# Patient Record
Sex: MALE | Race: WHITE | Employment: PART TIME | URBAN - METROPOLITAN AREA
[De-identification: names, ages, dates, MRNs, and addresses within clinical notes are randomized per-mention and may not be internally consistent; named-entity substitution may affect disease eponyms.]

---

## 2017-02-08 ENCOUNTER — OFFICE VISIT (OUTPATIENT)
Dept: DERMATOLOGY CLINIC | Facility: CLINIC | Age: 62
End: 2017-02-08

## 2017-02-08 DIAGNOSIS — C44.41 BASAL CELL CARCINOMA OF SCALP AND SKIN OF NECK: ICD-10-CM

## 2017-02-08 DIAGNOSIS — L82.1 SEBORRHEIC KERATOSES: ICD-10-CM

## 2017-02-08 DIAGNOSIS — D23.9 BENIGN NEOPLASM OF SKIN, UNSPECIFIED LOCATION: ICD-10-CM

## 2017-02-08 DIAGNOSIS — D48.5 NEOPLASM OF UNCERTAIN BEHAVIOR OF SKIN: Primary | ICD-10-CM

## 2017-02-08 DIAGNOSIS — Z85.828 HISTORY OF SKIN CANCER: ICD-10-CM

## 2017-02-08 PROCEDURE — 99213 OFFICE O/P EST LOW 20 MIN: CPT | Performed by: DERMATOLOGY

## 2017-02-08 PROCEDURE — 17272 DSTR MAL LES S/N/H/F/G 1.1-2: CPT | Performed by: DERMATOLOGY

## 2017-02-08 PROCEDURE — 11100 BIOPSY OF SKIN LESION: CPT | Performed by: DERMATOLOGY

## 2017-02-13 ENCOUNTER — TELEPHONE (OUTPATIENT)
Dept: DERMATOLOGY CLINIC | Facility: CLINIC | Age: 62
End: 2017-02-13

## 2017-02-14 NOTE — TELEPHONE ENCOUNTER
Plan was Mohs for the nose lesion--Dr. Lelo Cobos in past or Dr. Sandra Will am ok with either. Discussed Dr. Lulu Omalley in office and rtc4 mos post op    Scalp lesion e&c'ed at visit.     Please let pt know

## 2017-02-15 NOTE — PROGRESS NOTES
Parish Saravia is a 62year old male. HPI:     CC:  Patient presents with:  Lesion: LOV 7/9/2016. Pt presenting with lesions to scalp, nose, and L ear. Personal hx of BCC.         Allergies:  Penicillins    HISTORY:    Past Medical History   Diagnosis Date cell carcinoma of skin) 2017     right alar rim   • BCC (basal cell carcinoma of skin) 2017     left vertex scalp         Past Surgical History    ELBOW SURGERY Right Age 14    OTHER  September 2008    Comment Excision BCCa left temporal scalp    OTHER  No performed, including scalp, head, neck, face,nails, hair, external eyes, including conjunctival mucosa, eyelids, lips external ears, back, chest,  abdomen, arms, legs lower, palms.      Multiple light to medium brown, well marginated, uniformly pigmented, m recurrence prior BCC. Irritated keratosis right scapula reassurance    See previous map as well      Please refer to map for specific lesions. See additional diagnoses. Pros cons of various therapies, risks benefits discussed. Pathophysiology discussed

## 2017-02-17 NOTE — TELEPHONE ENCOUNTER
Pt contacted. Discussed path results below and KMT reccommendations. Verbalized understanding. Pt states he will call office with if he decides to go to  or  to have path report faxed.

## 2017-03-06 ENCOUNTER — TELEPHONE (OUTPATIENT)
Dept: DERMATOLOGY CLINIC | Facility: CLINIC | Age: 62
End: 2017-03-06

## 2017-03-18 RX ORDER — LISINOPRIL AND HYDROCHLOROTHIAZIDE 25; 20 MG/1; MG/1
TABLET ORAL
Qty: 90 TABLET | Refills: 0 | Status: SHIPPED | OUTPATIENT
Start: 2017-03-18 | End: 2017-04-20

## 2017-03-18 RX ORDER — LANSOPRAZOLE 30 MG/1
CAPSULE, DELAYED RELEASE ORAL
Qty: 90 CAPSULE | Refills: 0 | Status: SHIPPED | OUTPATIENT
Start: 2017-03-18 | End: 2017-04-20

## 2017-03-18 RX ORDER — NIFEDIPINE 30 MG/1
TABLET, FILM COATED, EXTENDED RELEASE ORAL
Qty: 90 TABLET | Refills: 0 | Status: SHIPPED | OUTPATIENT
Start: 2017-03-18 | End: 2017-04-20

## 2017-04-20 ENCOUNTER — OFFICE VISIT (OUTPATIENT)
Dept: INTERNAL MEDICINE CLINIC | Facility: CLINIC | Age: 62
End: 2017-04-20

## 2017-04-20 VITALS
BODY MASS INDEX: 35.7 KG/M2 | DIASTOLIC BLOOD PRESSURE: 68 MMHG | HEIGHT: 71 IN | SYSTOLIC BLOOD PRESSURE: 126 MMHG | WEIGHT: 255 LBS | HEART RATE: 106 BPM

## 2017-04-20 DIAGNOSIS — M70.22 OLECRANON BURSITIS, LEFT ELBOW: ICD-10-CM

## 2017-04-20 DIAGNOSIS — I10 ESSENTIAL HYPERTENSION: Primary | ICD-10-CM

## 2017-04-20 PROCEDURE — 99213 OFFICE O/P EST LOW 20 MIN: CPT | Performed by: INTERNAL MEDICINE

## 2017-04-20 PROCEDURE — 99212 OFFICE O/P EST SF 10 MIN: CPT | Performed by: INTERNAL MEDICINE

## 2017-04-20 RX ORDER — LISINOPRIL AND HYDROCHLOROTHIAZIDE 25; 20 MG/1; MG/1
TABLET ORAL
Qty: 90 TABLET | Refills: 1 | Status: SHIPPED | OUTPATIENT
Start: 2017-04-20 | End: 2017-10-13

## 2017-04-20 RX ORDER — LANSOPRAZOLE 30 MG/1
CAPSULE, DELAYED RELEASE ORAL
Qty: 90 CAPSULE | Refills: 1 | Status: SHIPPED | OUTPATIENT
Start: 2017-04-20 | End: 2017-10-13

## 2017-04-20 RX ORDER — NAPROXEN 500 MG/1
500 TABLET ORAL 2 TIMES DAILY WITH MEALS
Qty: 30 TABLET | Refills: 1 | Status: SHIPPED | OUTPATIENT
Start: 2017-04-20 | End: 2017-10-13

## 2017-04-20 RX ORDER — NIFEDIPINE 30 MG/1
30 TABLET, FILM COATED, EXTENDED RELEASE ORAL
Qty: 90 TABLET | Refills: 1 | Status: SHIPPED | OUTPATIENT
Start: 2017-04-20 | End: 2017-10-13

## 2017-04-20 NOTE — PROGRESS NOTES
Natasha Henderson is a 62year old male. Patient presents with:  Hypertension    HPI:   Mr. Harshal Moya presents this morning for six-month follow-up of hypertension. For the past 3 weeks, he has had persistent painless swelling of his left posterior elbow.   Vadim Claros Resonant to percussion and clear to auscultation  CARDIAC: Rhythm regular S1 S2 normal without murmur  ABDOMEN: Bowel sounds normal soft nontender   EXTREMITIES: Fluctuant nontender soft tissue swelling posterior aspect left elbow without overlying warmth

## 2017-08-11 ENCOUNTER — OFFICE VISIT (OUTPATIENT)
Dept: DERMATOLOGY CLINIC | Facility: CLINIC | Age: 62
End: 2017-08-11

## 2017-08-11 DIAGNOSIS — D23.5 BENIGN NEOPLASM OF SKIN OF TRUNK, EXCEPT SCROTUM: ICD-10-CM

## 2017-08-11 DIAGNOSIS — Z85.828 HISTORY OF SKIN CANCER: ICD-10-CM

## 2017-08-11 DIAGNOSIS — D23.30 BENIGN NEOPLASM OF SKIN OF FACE: ICD-10-CM

## 2017-08-11 DIAGNOSIS — L82.1 SEBORRHEIC KERATOSES: ICD-10-CM

## 2017-08-11 DIAGNOSIS — D23.4 BENIGN NEOPLASM OF SCALP AND SKIN OF NECK: ICD-10-CM

## 2017-08-11 DIAGNOSIS — D23.60 BENIGN NEOPLASM OF SKIN OF UPPER LIMB, INCLUDING SHOULDER, UNSPECIFIED LATERALITY: ICD-10-CM

## 2017-08-11 DIAGNOSIS — D48.5 NEOPLASM OF UNCERTAIN BEHAVIOR OF SKIN: Primary | ICD-10-CM

## 2017-08-11 DIAGNOSIS — L57.0 AK (ACTINIC KERATOSIS): ICD-10-CM

## 2017-08-11 PROCEDURE — 99212 OFFICE O/P EST SF 10 MIN: CPT | Performed by: DERMATOLOGY

## 2017-08-11 PROCEDURE — 11100 BIOPSY OF SKIN LESION: CPT | Performed by: DERMATOLOGY

## 2017-08-11 PROCEDURE — 88305 TISSUE EXAM BY PATHOLOGIST: CPT | Performed by: DERMATOLOGY

## 2017-08-11 PROCEDURE — 99213 OFFICE O/P EST LOW 20 MIN: CPT | Performed by: DERMATOLOGY

## 2017-08-21 ENCOUNTER — TELEPHONE (OUTPATIENT)
Dept: DERMATOLOGY CLINIC | Facility: CLINIC | Age: 62
End: 2017-08-21

## 2017-08-21 NOTE — PROGRESS NOTES
Adelaide Ball is a 65year old male. HPI:     CC:  Patient presents with:  Skin Cancer: LOV 2/28/2017. Pt presenting for f/u with lesions to nose and L side of scalp.  Pt has a personal hx of BCC         Allergies:  Penicillins    HISTORY:    Past Medical carcinoma of skin) 2017    right alar rim   • BCC (basal cell carcinoma of skin) 2017    left vertex scalp   • BPH (benign prostatic hyperplasia)    • Erectile dysfunction    • GERD (gastroesophageal reflux disease)    • Hypercholesterolemia    • Hypertens complaints. History, medications, allergies reviewed as noted. Physical Examination:     Well-developed well-nourished patient alert oriented in no acute distress.   Exam total-body performed, including scalp, head, neck, face,nails, hair, exte benefits discussed. Pathophysiology discussed with patient. Therapeutic options reviewed. See  Medications in grid. Instructions reviewed at length. Benign nevi, seborrheic  keratoses, cherry angiomas:  Reassurance regarding other benign skin lesions.

## 2017-08-22 NOTE — TELEPHONE ENCOUNTER
Correct site should be left parietal scalp--please correct. This is correct per pt, op note and bodymap/clinic note.   See info in my box

## 2017-08-22 NOTE — TELEPHONE ENCOUNTER
Discussed with pt bx from scalp--bcc--suggest MOHS. Discussed e&c vs exc with plastics vs mohs. I am concerned with recurrence if just treated with e&c and lesion is a fair size so mohs/ excision a better option. Correct site is left parietal scalp.

## 2017-08-22 NOTE — TELEPHONE ENCOUNTER
[facility] contacted and informed of correct biopsy site: left parietal scalp. Faxed correction on biopsy report to [facility] per request. Gonzalo Rodriguez from pathology confirmed they received fax and will have report changed later this morning.  Sebas khanna

## 2017-10-13 ENCOUNTER — APPOINTMENT (OUTPATIENT)
Dept: LAB | Facility: HOSPITAL | Age: 62
End: 2017-10-13
Attending: INTERNAL MEDICINE
Payer: COMMERCIAL

## 2017-10-13 ENCOUNTER — OFFICE VISIT (OUTPATIENT)
Dept: INTERNAL MEDICINE CLINIC | Facility: CLINIC | Age: 62
End: 2017-10-13

## 2017-10-13 VITALS
HEART RATE: 98 BPM | SYSTOLIC BLOOD PRESSURE: 132 MMHG | DIASTOLIC BLOOD PRESSURE: 82 MMHG | WEIGHT: 248 LBS | BODY MASS INDEX: 34.72 KG/M2 | HEIGHT: 71 IN

## 2017-10-13 DIAGNOSIS — Z00.00 ANNUAL PHYSICAL EXAM: Primary | ICD-10-CM

## 2017-10-13 DIAGNOSIS — I10 ESSENTIAL HYPERTENSION: ICD-10-CM

## 2017-10-13 DIAGNOSIS — K21.9 GASTROESOPHAGEAL REFLUX DISEASE, ESOPHAGITIS PRESENCE NOT SPECIFIED: ICD-10-CM

## 2017-10-13 DIAGNOSIS — Z00.00 ANNUAL PHYSICAL EXAM: ICD-10-CM

## 2017-10-13 DIAGNOSIS — Z72.0 TOBACCO USE: ICD-10-CM

## 2017-10-13 DIAGNOSIS — E78.00 HYPERCHOLESTEROLEMIA: ICD-10-CM

## 2017-10-13 PROCEDURE — 36415 COLL VENOUS BLD VENIPUNCTURE: CPT

## 2017-10-13 PROCEDURE — 90686 IIV4 VACC NO PRSV 0.5 ML IM: CPT | Performed by: INTERNAL MEDICINE

## 2017-10-13 PROCEDURE — 80061 LIPID PANEL: CPT

## 2017-10-13 PROCEDURE — 84443 ASSAY THYROID STIM HORMONE: CPT

## 2017-10-13 PROCEDURE — 85027 COMPLETE CBC AUTOMATED: CPT

## 2017-10-13 PROCEDURE — 90471 IMMUNIZATION ADMIN: CPT | Performed by: INTERNAL MEDICINE

## 2017-10-13 PROCEDURE — 99396 PREV VISIT EST AGE 40-64: CPT | Performed by: INTERNAL MEDICINE

## 2017-10-13 PROCEDURE — 80053 COMPREHEN METABOLIC PANEL: CPT

## 2017-10-13 RX ORDER — LANSOPRAZOLE 30 MG/1
CAPSULE, DELAYED RELEASE ORAL
Qty: 90 CAPSULE | Refills: 1 | Status: SHIPPED | OUTPATIENT
Start: 2017-10-13 | End: 2018-05-05

## 2017-10-13 RX ORDER — NIFEDIPINE 30 MG/1
30 TABLET, FILM COATED, EXTENDED RELEASE ORAL
Qty: 90 TABLET | Refills: 1 | Status: SHIPPED | OUTPATIENT
Start: 2017-10-13 | End: 2018-05-05

## 2017-10-13 RX ORDER — LISINOPRIL AND HYDROCHLOROTHIAZIDE 25; 20 MG/1; MG/1
TABLET ORAL
Qty: 90 TABLET | Refills: 1 | Status: SHIPPED | OUTPATIENT
Start: 2017-10-13 | End: 2018-05-05

## 2017-10-13 NOTE — H&P
Orinda Carrel is a 65year old male who presents this morning for a complete physical exam, as well as for follow-up of hypertension. HPI:   He feels well today. No specific issues for discussion.   Retired from the [employer], now working for the [employer] OTHER      Comment: Excision BCCa left temporal scalp  November 2008: OTHER      Comment:  Mohs surgery BCCa hairline  July 2016: OTHER      Comment: Excision BCCa upper back   Family History   Problem Relation Age of Onset   • Heart Disease Father      Hailey Lara Prostate mildly enlarged without induration and without rectal mass or tenderness    ASSESSMENT AND PLAN:   Parish Saravia is a 65year old male who presents for a complete physical exam.     1. Annual physical exam  Influenza vaccine today.   Check CMP CBC

## 2017-10-13 NOTE — PATIENT INSTRUCTIONS
You received a flu shot today. Await results of blood tests and stool testing. Please continue your current medications. Please try to eat healthy, exercise regularly, and lose some weight. Please try to stop smoking. Return visit in 6 months.

## 2017-10-18 ENCOUNTER — APPOINTMENT (OUTPATIENT)
Dept: LAB | Facility: HOSPITAL | Age: 62
End: 2017-10-18
Attending: INTERNAL MEDICINE
Payer: COMMERCIAL

## 2017-10-18 DIAGNOSIS — Z00.00 ANNUAL PHYSICAL EXAM: ICD-10-CM

## 2017-10-18 PROCEDURE — 82274 ASSAY TEST FOR BLOOD FECAL: CPT

## 2017-12-21 ENCOUNTER — MED REC SCAN ONLY (OUTPATIENT)
Dept: DERMATOLOGY CLINIC | Facility: CLINIC | Age: 62
End: 2017-12-21

## 2017-12-28 ENCOUNTER — OFFICE VISIT (OUTPATIENT)
Dept: DERMATOLOGY CLINIC | Facility: CLINIC | Age: 62
End: 2017-12-28

## 2017-12-28 DIAGNOSIS — D48.5 NEOPLASM OF UNCERTAIN BEHAVIOR OF SKIN: Primary | ICD-10-CM

## 2017-12-28 DIAGNOSIS — Z85.828 HISTORY OF SKIN CANCER: ICD-10-CM

## 2017-12-28 DIAGNOSIS — D23.30 BENIGN NEOPLASM OF SKIN OF FACE: ICD-10-CM

## 2017-12-28 DIAGNOSIS — D23.4 BENIGN NEOPLASM OF SCALP AND SKIN OF NECK: ICD-10-CM

## 2017-12-28 DIAGNOSIS — L82.1 SEBORRHEIC KERATOSES: ICD-10-CM

## 2017-12-28 DIAGNOSIS — L57.0 AK (ACTINIC KERATOSIS): ICD-10-CM

## 2017-12-28 DIAGNOSIS — D23.60 BENIGN NEOPLASM OF SKIN OF UPPER LIMB, INCLUDING SHOULDER, UNSPECIFIED LATERALITY: ICD-10-CM

## 2017-12-28 DIAGNOSIS — D23.5 BENIGN NEOPLASM OF SKIN OF TRUNK, EXCEPT SCROTUM: ICD-10-CM

## 2017-12-28 PROCEDURE — 88305 TISSUE EXAM BY PATHOLOGIST: CPT | Performed by: DERMATOLOGY

## 2017-12-28 PROCEDURE — 99212 OFFICE O/P EST SF 10 MIN: CPT | Performed by: DERMATOLOGY

## 2017-12-28 PROCEDURE — 11101 BIOPSY, EACH ADDED LESION: CPT | Performed by: DERMATOLOGY

## 2017-12-28 PROCEDURE — 11100 BIOPSY OF SKIN LESION: CPT | Performed by: DERMATOLOGY

## 2017-12-28 PROCEDURE — 99213 OFFICE O/P EST LOW 20 MIN: CPT | Performed by: DERMATOLOGY

## 2018-01-08 NOTE — PROGRESS NOTES
Michael Bosch is a 65year old male. HPI:     CC:  Patient presents with:  Upper Body Exam: LOV 8/2017.  Pt requesting upper body exam. Concerned with nonhealing lesion on left cheek x1 year (treated with cryotherapy in the past) and new red lesion on rig carcinoma of skin) 2017    right alar rim   • BCC (basal cell carcinoma of skin) 2017    left vertex scalp   • BPH (benign prostatic hyperplasia)    • Erectile dysfunction    • GERD (gastroesophageal reflux disease)    • Hypercholesterolemia    • Hypertens No new or changeing lesions other than noted above. No fevers, chills, night sweats, unusual sun sensitivity. No other skin complaints. History, medications, allergies reviewed as noted.        Physical Examination:     Well-developed well-nourished lesions  Pinkish patch upper back observe. Possible aldara    Multiple benign keratoses. AK's left cheek stable. Other benign nevi lentigines  Please refer to map for specific lesions. See additional diagnoses.   Pros cons of various therapies, risks gil

## 2018-01-09 RX ORDER — IMIQUIMOD 12.5 MG/.25G
CREAM TOPICAL
Qty: 24 PACKET | Refills: 3 | Status: SHIPPED | OUTPATIENT
Start: 2018-01-09 | End: 2018-01-09

## 2018-01-09 RX ORDER — IMIQUIMOD 12.5 MG/.25G
CREAM TOPICAL
Qty: 24 PACKET | Refills: 3 | Status: SHIPPED | OUTPATIENT
Start: 2018-01-09 | End: 2018-05-22 | Stop reason: ALTCHOICE

## 2018-01-09 NOTE — PROGRESS NOTES
The pathology report from last visit showed  bcc left lower cheek--plean was mohs with Dr. Abdirizak oK, chest--bcc--we had discussed aldara at visit. This seems reasonable--3x per week x 6weeks, use alternating weeks if very irritated.   Please send rx for ald

## 2018-01-09 NOTE — PROGRESS NOTES
Pt contacted and informed of path results and KMT reccs. Pt verbalized understanding of results and treatments.  Pt is agreeable to have mohs with Dr. Pennie Ennis  For the bcc to left lower cheek and to try the aldara medication for [facility] on chest. Rx sent and fa

## 2018-01-18 ENCOUNTER — TELEPHONE (OUTPATIENT)
Dept: DERMATOLOGY CLINIC | Facility: CLINIC | Age: 63
End: 2018-01-18

## 2018-04-18 ENCOUNTER — MED REC SCAN ONLY (OUTPATIENT)
Dept: DERMATOLOGY CLINIC | Facility: CLINIC | Age: 63
End: 2018-04-18

## 2018-05-05 RX ORDER — NIFEDIPINE 30 MG/1
30 TABLET, FILM COATED, EXTENDED RELEASE ORAL
Qty: 90 TABLET | Refills: 1 | Status: SHIPPED | OUTPATIENT
Start: 2018-05-05 | End: 2018-10-31

## 2018-05-05 RX ORDER — LISINOPRIL AND HYDROCHLOROTHIAZIDE 25; 20 MG/1; MG/1
TABLET ORAL
Qty: 90 TABLET | Refills: 1 | Status: SHIPPED | OUTPATIENT
Start: 2018-05-05 | End: 2018-10-31

## 2018-05-05 RX ORDER — LANSOPRAZOLE 30 MG/1
CAPSULE, DELAYED RELEASE ORAL
Qty: 90 CAPSULE | Refills: 1 | Status: SHIPPED | OUTPATIENT
Start: 2018-05-05 | End: 2018-10-31

## 2018-05-22 ENCOUNTER — OFFICE VISIT (OUTPATIENT)
Dept: INTERNAL MEDICINE CLINIC | Facility: CLINIC | Age: 63
End: 2018-05-22

## 2018-05-22 VITALS
SYSTOLIC BLOOD PRESSURE: 122 MMHG | HEIGHT: 68.75 IN | WEIGHT: 250 LBS | DIASTOLIC BLOOD PRESSURE: 78 MMHG | BODY MASS INDEX: 37.03 KG/M2 | HEART RATE: 94 BPM

## 2018-05-22 DIAGNOSIS — I10 ESSENTIAL HYPERTENSION: Primary | ICD-10-CM

## 2018-05-22 PROCEDURE — 99213 OFFICE O/P EST LOW 20 MIN: CPT | Performed by: INTERNAL MEDICINE

## 2018-05-22 PROCEDURE — 99212 OFFICE O/P EST SF 10 MIN: CPT | Performed by: INTERNAL MEDICINE

## 2018-05-22 NOTE — PROGRESS NOTES
Parish Saravia is a 65year old male. Patient presents with:  Hypercholesterolemia  Hypertension    HPI:   Mr. Gregory Yasminekatybrodiecm presents this morning for six-month follow-up of hypertension. He feels well.   He checks his blood pressure infrequently, with recent use: Yes              Comment: 2-3 beers daily       EXAM:   GENERAL: Pleasant male appearing well in no distress  /78   Pulse 94   Ht 5' 8.75\" (1.746 m)   Wt 250 lb (113.4 kg)   BMI 37.19 kg/m²   LUNGS: Resonant to percussion and clear to auscultat

## 2018-08-14 ENCOUNTER — OFFICE VISIT (OUTPATIENT)
Dept: INTERNAL MEDICINE CLINIC | Facility: CLINIC | Age: 63
End: 2018-08-14
Payer: COMMERCIAL

## 2018-08-14 ENCOUNTER — HOSPITAL ENCOUNTER (OUTPATIENT)
Dept: GENERAL RADIOLOGY | Facility: HOSPITAL | Age: 63
Discharge: HOME | End: 2018-08-14
Attending: INTERNAL MEDICINE
Payer: COMMERCIAL

## 2018-08-14 VITALS
RESPIRATION RATE: 20 BRPM | BODY MASS INDEX: 37.96 KG/M2 | TEMPERATURE: 98 F | WEIGHT: 256.31 LBS | HEART RATE: 94 BPM | DIASTOLIC BLOOD PRESSURE: 88 MMHG | HEIGHT: 68.75 IN | SYSTOLIC BLOOD PRESSURE: 130 MMHG

## 2018-08-14 DIAGNOSIS — M25.512 CHRONIC LEFT SHOULDER PAIN: Primary | ICD-10-CM

## 2018-08-14 DIAGNOSIS — G89.29 CHRONIC LEFT SHOULDER PAIN: ICD-10-CM

## 2018-08-14 DIAGNOSIS — M25.512 CHRONIC LEFT SHOULDER PAIN: ICD-10-CM

## 2018-08-14 DIAGNOSIS — G89.29 CHRONIC LEFT SHOULDER PAIN: Primary | ICD-10-CM

## 2018-08-14 PROCEDURE — 99212 OFFICE O/P EST SF 10 MIN: CPT | Performed by: INTERNAL MEDICINE

## 2018-08-14 PROCEDURE — 73030 X-RAY EXAM OF SHOULDER: CPT | Performed by: INTERNAL MEDICINE

## 2018-08-14 PROCEDURE — 99213 OFFICE O/P EST LOW 20 MIN: CPT | Performed by: INTERNAL MEDICINE

## 2018-08-14 RX ORDER — NAPROXEN 500 MG/1
500 TABLET ORAL 2 TIMES DAILY WITH MEALS
Qty: 30 TABLET | Refills: 0 | Status: SHIPPED | OUTPATIENT
Start: 2018-08-14 | End: 2018-11-06

## 2018-08-14 NOTE — PATIENT INSTRUCTIONS
Await results of left shoulder x-ray. Please rest your shoulder and apply heat 2-3 times daily. Please take naproxen 500 mg twice daily with food. Schedule an appointment with Orthopedics. Schedule a physical in October.

## 2018-08-14 NOTE — PROGRESS NOTES
José Luis Anderson is a 59year old male. Patient presents with:  Shoulder Pain: Patient here with c/o left shoulder pain    HPI:   Since April of this year he has had persistent intermittent pain in his left shoulder.   Pain is worse with certain movement such OTHER      Comment: Excision BCCa upper back   Social History:  Smoking status: Current Every Day Smoker                                                   Packs/day: 1.00      Years: 35.00        Types: Cigarettes  Smokeless tobacco: Never Used

## 2018-10-31 RX ORDER — LISINOPRIL AND HYDROCHLOROTHIAZIDE 25; 20 MG/1; MG/1
1 TABLET ORAL
Qty: 90 TABLET | Refills: 0 | Status: SHIPPED | OUTPATIENT
Start: 2018-10-31 | End: 2019-01-29

## 2018-10-31 RX ORDER — NIFEDIPINE 30 MG/1
30 TABLET, FILM COATED, EXTENDED RELEASE ORAL
Qty: 90 TABLET | Refills: 0 | Status: SHIPPED | OUTPATIENT
Start: 2018-10-31 | End: 2019-01-29

## 2018-10-31 RX ORDER — LANSOPRAZOLE 30 MG/1
CAPSULE, DELAYED RELEASE ORAL
Qty: 90 CAPSULE | Refills: 0 | Status: SHIPPED | OUTPATIENT
Start: 2018-10-31 | End: 2019-01-29

## 2018-10-31 NOTE — TELEPHONE ENCOUNTER
Pharmacy stts pt is out of medications and has been waiting since last week. [facility] fax was sent, do not seeing anything in Epic. NIFEDIPINE ER 30 MG Oral Tablet 24 Hr TAKE 1 TABLET (30 MG TOTAL) BY MOUTH ONCE DAILY.  Disp: 90 tablet Rfl: 1   LISKARLOS

## 2018-11-01 NOTE — TELEPHONE ENCOUNTER
Please review; protocol failed.  D/t labs greater than 12 months    Cholesterol Medications  Protocol Criteria:  · Appointment scheduled in the past 12 months or in the next 3 months  · ALT & LDL on file in the past 12 months  · ALT result < 80  · LDL resul Desean Harper MD    Office Visit        Future Appointments       Provider Department Appt Notes    In 6 days Yariel Martinez MD [facility], [facility], [address]         Lab Results   Component Value Date     (H) 10/13/2017    BUN 16 10/13

## 2018-11-06 ENCOUNTER — OFFICE VISIT (OUTPATIENT)
Dept: INTERNAL MEDICINE CLINIC | Facility: CLINIC | Age: 63
End: 2018-11-06
Payer: COMMERCIAL

## 2018-11-06 ENCOUNTER — APPOINTMENT (OUTPATIENT)
Dept: LAB | Facility: HOSPITAL | Age: 63
End: 2018-11-06
Attending: INTERNAL MEDICINE
Payer: COMMERCIAL

## 2018-11-06 VITALS
WEIGHT: 252 LBS | DIASTOLIC BLOOD PRESSURE: 78 MMHG | HEIGHT: 68.75 IN | BODY MASS INDEX: 37.33 KG/M2 | SYSTOLIC BLOOD PRESSURE: 128 MMHG | HEART RATE: 83 BPM

## 2018-11-06 DIAGNOSIS — I10 ESSENTIAL HYPERTENSION: ICD-10-CM

## 2018-11-06 DIAGNOSIS — Z00.00 ANNUAL PHYSICAL EXAM: ICD-10-CM

## 2018-11-06 DIAGNOSIS — E78.00 HYPERCHOLESTEROLEMIA: ICD-10-CM

## 2018-11-06 DIAGNOSIS — Z00.00 ANNUAL PHYSICAL EXAM: Primary | ICD-10-CM

## 2018-11-06 PROCEDURE — 80061 LIPID PANEL: CPT

## 2018-11-06 PROCEDURE — 85027 COMPLETE CBC AUTOMATED: CPT

## 2018-11-06 PROCEDURE — 99396 PREV VISIT EST AGE 40-64: CPT | Performed by: INTERNAL MEDICINE

## 2018-11-06 PROCEDURE — 36415 COLL VENOUS BLD VENIPUNCTURE: CPT

## 2018-11-06 PROCEDURE — 80053 COMPREHEN METABOLIC PANEL: CPT

## 2018-11-06 NOTE — H&P
Janae Grant is a 59year old male who presents for a complete physical exam.   HPI:   His last physical was October 2017. He feels well. No specific issues for discussion. Left shoulder pain present at his last visit is now somewhat better.   He john scalp   • BPH (benign prostatic hyperplasia)    • Erectile dysfunction    • GERD (gastroesophageal reflux disease)    • Hypercholesterolemia    • Hypertension    • Recurrent skin cancer       Past Surgical History:   Procedure Laterality Date   • ELBOW JOSE Rhythm regular S1 S2 normal without murmur or edema  ABDOMEN: Obese.   Bowel sounds normal soft nontender without mass or hepatosplenomegaly  EXTREMITIES: Normal without cyanosis or clubbing  PULSES: Carotid upstrokes 2+ without carotid bruits, distal pulse

## 2018-11-06 NOTE — PATIENT INSTRUCTIONS
Await results of today's blood tests. Please complete stool testing for colon cancer screening. Congratulations on stopping smoking cigarettes. Continue current medication. Continue healthy diet and regular exercise. Return visit in 6 months.

## 2018-11-12 ENCOUNTER — APPOINTMENT (OUTPATIENT)
Dept: LAB | Facility: HOSPITAL | Age: 63
End: 2018-11-12
Attending: INTERNAL MEDICINE
Payer: COMMERCIAL

## 2018-11-12 PROCEDURE — 82274 ASSAY TEST FOR BLOOD FECAL: CPT

## 2019-01-29 RX ORDER — LANSOPRAZOLE 30 MG/1
CAPSULE, DELAYED RELEASE ORAL
Qty: 90 CAPSULE | Refills: 1 | Status: SHIPPED | OUTPATIENT
Start: 2019-01-29 | End: 2019-07-02 | Stop reason: DRUGHIGH

## 2019-01-29 RX ORDER — LISINOPRIL AND HYDROCHLOROTHIAZIDE 25; 20 MG/1; MG/1
TABLET ORAL
Qty: 90 TABLET | Refills: 1 | Status: SHIPPED | OUTPATIENT
Start: 2019-01-29 | End: 2019-07-02

## 2019-01-29 RX ORDER — NIFEDIPINE 30 MG/1
TABLET, EXTENDED RELEASE ORAL
Qty: 90 TABLET | Refills: 1 | Status: SHIPPED | OUTPATIENT
Start: 2019-01-29 | End: 2019-07-02

## 2019-02-22 ENCOUNTER — OFFICE VISIT (OUTPATIENT)
Dept: DERMATOLOGY CLINIC | Facility: CLINIC | Age: 64
End: 2019-02-22
Payer: COMMERCIAL

## 2019-02-22 DIAGNOSIS — D23.60 BENIGN NEOPLASM OF SKIN OF UPPER LIMB, INCLUDING SHOULDER, UNSPECIFIED LATERALITY: ICD-10-CM

## 2019-02-22 DIAGNOSIS — D23.4 BENIGN NEOPLASM OF SCALP AND SKIN OF NECK: ICD-10-CM

## 2019-02-22 DIAGNOSIS — D23.5 BENIGN NEOPLASM OF SKIN OF TRUNK, EXCEPT SCROTUM: ICD-10-CM

## 2019-02-22 DIAGNOSIS — L57.0 AK (ACTINIC KERATOSIS): Primary | ICD-10-CM

## 2019-02-22 DIAGNOSIS — Z85.828 HISTORY OF SKIN CANCER: ICD-10-CM

## 2019-02-22 DIAGNOSIS — D23.30 BENIGN NEOPLASM OF SKIN OF FACE: ICD-10-CM

## 2019-02-22 DIAGNOSIS — L82.1 SEBORRHEIC KERATOSES: ICD-10-CM

## 2019-02-22 PROCEDURE — 99212 OFFICE O/P EST SF 10 MIN: CPT | Performed by: DERMATOLOGY

## 2019-02-22 PROCEDURE — 17000 DESTRUCT PREMALG LESION: CPT | Performed by: DERMATOLOGY

## 2019-02-22 PROCEDURE — 99213 OFFICE O/P EST LOW 20 MIN: CPT | Performed by: DERMATOLOGY

## 2019-02-22 RX ORDER — IMIQUIMOD 12.5 MG/.25G
CREAM TOPICAL
Qty: 12 EACH | Refills: 1 | Status: SHIPPED | OUTPATIENT
Start: 2019-02-22 | End: 2019-07-12

## 2019-03-04 NOTE — PROGRESS NOTES
Janell Kinney is a 59year old male. HPI:     CC:  Patient presents with:  Derm Problem: LOV 12/28/17. Pt presenting today for body check.  Pt has HX of BCC        Allergies:  Penicillins    HISTORY:    Past Medical History:   Diagnosis Date   • Basal alla EVERY MORNING BEFORE BREAKFAST.  Disp: 90 capsule Rfl: 1   LISINOPRIL-HYDROCHLOROTHIAZIDE 20-25 MG Oral Tab TAKE 1 TABLET BY MOUTH EVERY DAY Disp: 90 tablet Rfl: 1     Allergies:     Penicillins             FACE FLUSHING    Past Medical History:   Diagnosis file        Minutes per session: Not on file      Stress: Not on file    Relationships      Social connections:        Talks on phone: Not on file        Gets together: Not on file        Attends [employer] service: Not on file        Active member of club reviewed as noted. ROS:  Denies any other systemic complaints. No new or changeing lesions other than noted above. No fevers, chills, night sweats, unusual sun sensitivity. No other skin complaints.         History, medications, allergies reviewed as chest clear. No recurrence prior BCC. No other new suspicious lesions  Pinkish patch upper back observe. Possible aldara    Multiple benign keratoses. AK's left cheek stable. Actinic keratoses. Precancerous nature discussed.   Treatment options revie

## 2019-04-12 ENCOUNTER — OFFICE VISIT (OUTPATIENT)
Dept: INTERNAL MEDICINE CLINIC | Facility: CLINIC | Age: 64
End: 2019-04-12
Payer: COMMERCIAL

## 2019-04-12 VITALS
DIASTOLIC BLOOD PRESSURE: 85 MMHG | SYSTOLIC BLOOD PRESSURE: 134 MMHG | WEIGHT: 264 LBS | HEIGHT: 68.75 IN | HEART RATE: 102 BPM | BODY MASS INDEX: 39.1 KG/M2 | TEMPERATURE: 97 F

## 2019-04-12 DIAGNOSIS — R39.12 WEAK URINARY STREAM: ICD-10-CM

## 2019-04-12 DIAGNOSIS — R39.9 UTI SYMPTOMS: Primary | ICD-10-CM

## 2019-04-12 PROCEDURE — 81002 URINALYSIS NONAUTO W/O SCOPE: CPT | Performed by: INTERNAL MEDICINE

## 2019-04-12 PROCEDURE — 99213 OFFICE O/P EST LOW 20 MIN: CPT | Performed by: INTERNAL MEDICINE

## 2019-04-12 PROCEDURE — 99212 OFFICE O/P EST SF 10 MIN: CPT | Performed by: INTERNAL MEDICINE

## 2019-04-12 RX ORDER — CIPROFLOXACIN 500 MG/1
500 TABLET, FILM COATED ORAL 2 TIMES DAILY
Qty: 14 TABLET | Refills: 0 | Status: SHIPPED | OUTPATIENT
Start: 2019-04-12 | End: 2019-04-19

## 2019-04-12 NOTE — PROGRESS NOTES
Osman Echeverria is a 59year old male. Patient presents with:  UTI    HPI:   For the past several days, he has noticed a very weak urinary stream.  His urinary stream is usually somewhat weak, but it has gotten progressively worse in the last few days.   He Excision BCCa left parietal scalp   • OTHER  12/2017    Excision BCCa left lower cheek and left central chest      Social History:  Social History    Tobacco Use      Smoking status: Former Smoker        Packs/day: 1.00        Years: 35.00        Pack year

## 2019-04-12 NOTE — PATIENT INSTRUCTIONS
Await results of urine culture. Please take Cipro 500 mg twice daily for 7 days. Schedule an appointment with Urology. Go to the ER if you are suddenly unable to urinate at all.

## 2019-05-21 ENCOUNTER — TELEPHONE (OUTPATIENT)
Dept: SURGERY | Facility: CLINIC | Age: 64
End: 2019-05-21

## 2019-05-21 NOTE — TELEPHONE ENCOUNTER
Spoke with pt to infor that we need to reschd. His visit that he has with RYLANB this week thurs. 5/23 and pt did not want to accept the appt I offered with [facility] for the same day at 1:40 pm or 2 pm and pt declined d/t work issues.  Pt wanted to get the next avai

## 2019-07-01 ENCOUNTER — OFFICE VISIT (OUTPATIENT)
Dept: SURGERY | Facility: CLINIC | Age: 64
End: 2019-07-01
Payer: COMMERCIAL

## 2019-07-01 ENCOUNTER — LAB ENCOUNTER (OUTPATIENT)
Dept: LAB | Facility: HOSPITAL | Age: 64
End: 2019-07-01
Attending: UROLOGY
Payer: COMMERCIAL

## 2019-07-01 ENCOUNTER — TELEPHONE (OUTPATIENT)
Dept: SURGERY | Facility: CLINIC | Age: 64
End: 2019-07-01

## 2019-07-01 ENCOUNTER — APPOINTMENT (OUTPATIENT)
Dept: LAB | Facility: HOSPITAL | Age: 64
End: 2019-07-01
Attending: UROLOGY
Payer: COMMERCIAL

## 2019-07-01 VITALS
DIASTOLIC BLOOD PRESSURE: 85 MMHG | HEIGHT: 71 IN | SYSTOLIC BLOOD PRESSURE: 134 MMHG | HEART RATE: 87 BPM | TEMPERATURE: 98 F | BODY MASS INDEX: 35 KG/M2 | WEIGHT: 250 LBS

## 2019-07-01 DIAGNOSIS — Z01.818 PREOP EXAMINATION: ICD-10-CM

## 2019-07-01 DIAGNOSIS — Q64.33 CONGENITAL MEATAL STENOSIS: ICD-10-CM

## 2019-07-01 DIAGNOSIS — R39.12 WEAK URINARY STREAM: ICD-10-CM

## 2019-07-01 DIAGNOSIS — R39.12 WEAK URINARY STREAM: Primary | ICD-10-CM

## 2019-07-01 LAB
ANION GAP SERPL CALC-SCNC: 6 MMOL/L (ref 0–18)
BASOPHILS # BLD AUTO: 0.06 X10(3) UL (ref 0–0.2)
BASOPHILS NFR BLD AUTO: 0.8 %
BUN BLD-MCNC: 19 MG/DL (ref 7–18)
BUN/CREAT SERPL: 20 (ref 10–20)
CALCIUM BLD-MCNC: 9 MG/DL (ref 8.5–10.1)
CHLORIDE SERPL-SCNC: 105 MMOL/L (ref 98–112)
CO2 SERPL-SCNC: 29 MMOL/L (ref 21–32)
CREAT BLD-MCNC: 0.95 MG/DL (ref 0.7–1.3)
DEPRECATED RDW RBC AUTO: 40.9 FL (ref 35.1–46.3)
EOSINOPHIL # BLD AUTO: 0.1 X10(3) UL (ref 0–0.7)
EOSINOPHIL NFR BLD AUTO: 1.4 %
ERYTHROCYTE [DISTWIDTH] IN BLOOD BY AUTOMATED COUNT: 12.9 % (ref 11–15)
GLUCOSE BLD-MCNC: 94 MG/DL (ref 70–99)
HCT VFR BLD AUTO: 45.2 % (ref 39–53)
HGB BLD-MCNC: 15.2 G/DL (ref 13–17.5)
IMM GRANULOCYTES # BLD AUTO: 0.02 X10(3) UL (ref 0–1)
IMM GRANULOCYTES NFR BLD: 0.3 %
LYMPHOCYTES # BLD AUTO: 1.89 X10(3) UL (ref 1–4)
LYMPHOCYTES NFR BLD AUTO: 25.5 %
MCH RBC QN AUTO: 29.3 PG (ref 26–34)
MCHC RBC AUTO-ENTMCNC: 33.6 G/DL (ref 31–37)
MCV RBC AUTO: 87.3 FL (ref 80–100)
MONOCYTES # BLD AUTO: 0.65 X10(3) UL (ref 0.1–1)
MONOCYTES NFR BLD AUTO: 8.8 %
NEUTROPHILS # BLD AUTO: 4.68 X10 (3) UL (ref 1.5–7.7)
NEUTROPHILS # BLD AUTO: 4.68 X10(3) UL (ref 1.5–7.7)
NEUTROPHILS NFR BLD AUTO: 63.2 %
OSMOLALITY SERPL CALC.SUM OF ELEC: 292 MOSM/KG (ref 275–295)
PATIENT FASTING: YES
PLATELET # BLD AUTO: 302 10(3)UL (ref 150–450)
POTASSIUM SERPL-SCNC: 3.6 MMOL/L (ref 3.5–5.1)
RBC # BLD AUTO: 5.18 X10(6)UL (ref 4.3–5.7)
SODIUM SERPL-SCNC: 140 MMOL/L (ref 136–145)
WBC # BLD AUTO: 7.4 X10(3) UL (ref 4–11)

## 2019-07-01 PROCEDURE — 85025 COMPLETE CBC W/AUTO DIFF WBC: CPT

## 2019-07-01 PROCEDURE — 99212 OFFICE O/P EST SF 10 MIN: CPT | Performed by: UROLOGY

## 2019-07-01 PROCEDURE — 87086 URINE CULTURE/COLONY COUNT: CPT

## 2019-07-01 PROCEDURE — 99244 OFF/OP CNSLTJ NEW/EST MOD 40: CPT | Performed by: UROLOGY

## 2019-07-01 PROCEDURE — 93010 ELECTROCARDIOGRAM REPORT: CPT | Performed by: UROLOGY

## 2019-07-01 PROCEDURE — 80048 BASIC METABOLIC PNL TOTAL CA: CPT

## 2019-07-01 PROCEDURE — 36415 COLL VENOUS BLD VENIPUNCTURE: CPT

## 2019-07-01 PROCEDURE — 93005 ELECTROCARDIOGRAM TRACING: CPT

## 2019-07-01 NOTE — PROGRESS NOTES
SUBJECTIVE:  Tera Barry is a 59year old male who presents for a consultation at the request of, and a copy of this note will be sent to, Rena Larkin, for evaluation of  Weak urinary stream. He states that the problem is unchanged.  Symptoms inc Packs/day: 1.00        Years: 35.00        Pack years: 35        Types: Cigarettes      Smokeless tobacco: Never Used    Alcohol use: Yes      Comment: 2-3 beers daily    Drug use: No           REVIEW OF SYSTEMS:  RESPIRATORY:  Negative for chest tightness encounter. Reviewed findings. Exam is notable for pronounced meatal stenosis likely accounting for his symptoms. Recommended cystoscopy under anesthesia, meatotomy.   Risks and possible side effects were reviewed with the patient and he understands and a

## 2019-07-01 NOTE — TELEPHONE ENCOUNTER
Patient seen in office today, scheduled for cystoscopy, meatotomy, possible bladder biopsy, Wednesday 07/17/19, labs done, went over pre-op instructions all questions answered verbalized understanding.

## 2019-07-02 ENCOUNTER — TELEPHONE (OUTPATIENT)
Dept: SURGERY | Facility: CLINIC | Age: 64
End: 2019-07-02

## 2019-07-02 ENCOUNTER — OFFICE VISIT (OUTPATIENT)
Dept: INTERNAL MEDICINE CLINIC | Facility: CLINIC | Age: 64
End: 2019-07-02
Payer: COMMERCIAL

## 2019-07-02 VITALS
SYSTOLIC BLOOD PRESSURE: 118 MMHG | BODY MASS INDEX: 36.68 KG/M2 | DIASTOLIC BLOOD PRESSURE: 78 MMHG | HEART RATE: 93 BPM | HEIGHT: 71 IN | WEIGHT: 262 LBS

## 2019-07-02 DIAGNOSIS — I10 ESSENTIAL HYPERTENSION: Primary | ICD-10-CM

## 2019-07-02 DIAGNOSIS — R94.31 ABNORMAL EKG: ICD-10-CM

## 2019-07-02 DIAGNOSIS — R39.12 WEAK URINARY STREAM: ICD-10-CM

## 2019-07-02 PROCEDURE — 99213 OFFICE O/P EST LOW 20 MIN: CPT | Performed by: INTERNAL MEDICINE

## 2019-07-02 RX ORDER — LANSOPRAZOLE 15 MG/1
15 CAPSULE, DELAYED RELEASE ORAL DAILY
Qty: 90 CAPSULE | Refills: 1 | Status: SHIPPED | OUTPATIENT
Start: 2019-07-02 | End: 2019-12-26

## 2019-07-02 RX ORDER — NIFEDIPINE 30 MG/1
30 TABLET, EXTENDED RELEASE ORAL
Qty: 90 TABLET | Refills: 1 | Status: SHIPPED | OUTPATIENT
Start: 2019-07-02 | End: 2019-12-26

## 2019-07-02 RX ORDER — LANSOPRAZOLE 30 MG/1
30 CAPSULE, DELAYED RELEASE ORAL DAILY
Qty: 90 CAPSULE | Refills: 1 | Status: CANCELLED | OUTPATIENT
Start: 2019-07-02

## 2019-07-02 RX ORDER — LISINOPRIL AND HYDROCHLOROTHIAZIDE 25; 20 MG/1; MG/1
1 TABLET ORAL
Qty: 90 TABLET | Refills: 1 | Status: SHIPPED | OUTPATIENT
Start: 2019-07-02 | End: 2019-12-26

## 2019-07-02 NOTE — PROGRESS NOTES
Fariha Portillo is a 59year old male. Patient presents with:  Hypertension    HPI:   Coco Lopez presents this morning for follow-up of hypertension. He feels well. He tries to watch his diet, and walks 30 minutes daily. Weight has been stable.   Occasional • BPH (benign prostatic hyperplasia)    • Erectile dysfunction    • GERD (gastroesophageal reflux disease)    • Hypercholesterolemia    • Hypertension    • Recurrent skin cancer      Past Surgical History:   Procedure Laterality Date   • ELBOW SURGERY Ri

## 2019-07-02 NOTE — PATIENT INSTRUCTIONS
Decrease lansoprazole to 15 mg daily. Continue other medications. Schedule a physical before the end of this year.

## 2019-07-02 NOTE — TELEPHONE ENCOUNTER
Pt had a EKG done yesterday and states he saw the results on MyChart were abnormal. Pt asking if he can still have sx on 07/17/19 or if there is any additional testing he needs to have done. Pls advise, thank you.

## 2019-07-08 ENCOUNTER — TELEPHONE (OUTPATIENT)
Dept: SURGERY | Facility: CLINIC | Age: 64
End: 2019-07-08

## 2019-07-17 ENCOUNTER — PATIENT MESSAGE (OUTPATIENT)
Dept: SURGERY | Facility: CLINIC | Age: 64
End: 2019-07-17

## 2019-07-17 ENCOUNTER — TELEPHONE (OUTPATIENT)
Dept: SURGERY | Facility: CLINIC | Age: 64
End: 2019-07-17

## 2019-07-17 ENCOUNTER — ANESTHESIA EVENT (OUTPATIENT)
Dept: SURGERY | Facility: HOSPITAL | Age: 64
End: 2019-07-17
Payer: COMMERCIAL

## 2019-07-17 ENCOUNTER — ANESTHESIA (OUTPATIENT)
Dept: SURGERY | Facility: HOSPITAL | Age: 64
End: 2019-07-17
Payer: COMMERCIAL

## 2019-07-17 ENCOUNTER — HOSPITAL ENCOUNTER (OUTPATIENT)
Facility: HOSPITAL | Age: 64
Setting detail: HOSPITAL OUTPATIENT SURGERY
Discharge: HOME | End: 2019-07-17
Attending: UROLOGY | Admitting: UROLOGY
Payer: COMMERCIAL

## 2019-07-17 VITALS
WEIGHT: 263 LBS | OXYGEN SATURATION: 95 % | DIASTOLIC BLOOD PRESSURE: 83 MMHG | HEART RATE: 85 BPM | TEMPERATURE: 98 F | HEIGHT: 70 IN | BODY MASS INDEX: 37.65 KG/M2 | RESPIRATION RATE: 12 BRPM | SYSTOLIC BLOOD PRESSURE: 139 MMHG

## 2019-07-17 DIAGNOSIS — Q64.33 CONGENITAL MEATAL STENOSIS: ICD-10-CM

## 2019-07-17 PROCEDURE — 0T7D8ZZ DILATION OF URETHRA, VIA NATURAL OR ARTIFICIAL OPENING ENDOSCOPIC: ICD-10-PCS | Performed by: UROLOGY

## 2019-07-17 PROCEDURE — 52281 CYSTOSCOPY AND TREATMENT: CPT | Performed by: UROLOGY

## 2019-07-17 RX ORDER — NALOXONE HYDROCHLORIDE 0.4 MG/ML
80 INJECTION, SOLUTION INTRAMUSCULAR; INTRAVENOUS; SUBCUTANEOUS AS NEEDED
Status: DISCONTINUED | OUTPATIENT
Start: 2019-07-17 | End: 2019-07-17

## 2019-07-17 RX ORDER — MORPHINE SULFATE 10 MG/ML
6 INJECTION, SOLUTION INTRAMUSCULAR; INTRAVENOUS EVERY 10 MIN PRN
Status: DISCONTINUED | OUTPATIENT
Start: 2019-07-17 | End: 2019-07-17

## 2019-07-17 RX ORDER — HYDROCODONE BITARTRATE AND ACETAMINOPHEN 5; 325 MG/1; MG/1
1 TABLET ORAL AS NEEDED
Status: DISCONTINUED | OUTPATIENT
Start: 2019-07-17 | End: 2019-07-17

## 2019-07-17 RX ORDER — PROCHLORPERAZINE EDISYLATE 5 MG/ML
5 INJECTION INTRAMUSCULAR; INTRAVENOUS ONCE AS NEEDED
Status: DISCONTINUED | OUTPATIENT
Start: 2019-07-17 | End: 2019-07-17

## 2019-07-17 RX ORDER — DEXAMETHASONE SODIUM PHOSPHATE 4 MG/ML
VIAL (ML) INJECTION AS NEEDED
Status: DISCONTINUED | OUTPATIENT
Start: 2019-07-17 | End: 2019-07-17 | Stop reason: SURG

## 2019-07-17 RX ORDER — HYDROMORPHONE HYDROCHLORIDE 1 MG/ML
0.6 INJECTION, SOLUTION INTRAMUSCULAR; INTRAVENOUS; SUBCUTANEOUS EVERY 5 MIN PRN
Status: DISCONTINUED | OUTPATIENT
Start: 2019-07-17 | End: 2019-07-17

## 2019-07-17 RX ORDER — MORPHINE SULFATE 2 MG/ML
2 INJECTION, SOLUTION INTRAMUSCULAR; INTRAVENOUS EVERY 10 MIN PRN
Status: DISCONTINUED | OUTPATIENT
Start: 2019-07-17 | End: 2019-07-17

## 2019-07-17 RX ORDER — MIDAZOLAM HYDROCHLORIDE 1 MG/ML
INJECTION INTRAMUSCULAR; INTRAVENOUS AS NEEDED
Status: DISCONTINUED | OUTPATIENT
Start: 2019-07-17 | End: 2019-07-17 | Stop reason: SURG

## 2019-07-17 RX ORDER — ONDANSETRON 2 MG/ML
4 INJECTION INTRAMUSCULAR; INTRAVENOUS ONCE AS NEEDED
Status: DISCONTINUED | OUTPATIENT
Start: 2019-07-17 | End: 2019-07-17

## 2019-07-17 RX ORDER — HYDROMORPHONE HYDROCHLORIDE 1 MG/ML
0.2 INJECTION, SOLUTION INTRAMUSCULAR; INTRAVENOUS; SUBCUTANEOUS EVERY 5 MIN PRN
Status: DISCONTINUED | OUTPATIENT
Start: 2019-07-17 | End: 2019-07-17

## 2019-07-17 RX ORDER — PHENAZOPYRIDINE HYDROCHLORIDE 200 MG/1
200 TABLET, FILM COATED ORAL 3 TIMES DAILY PRN
Qty: 10 TABLET | Refills: 0 | Status: SHIPPED | OUTPATIENT
Start: 2019-07-17 | End: 2019-07-19

## 2019-07-17 RX ORDER — ACETAMINOPHEN 500 MG
1000 TABLET ORAL ONCE
Status: COMPLETED | OUTPATIENT
Start: 2019-07-17 | End: 2019-07-17

## 2019-07-17 RX ORDER — FAMOTIDINE 20 MG/1
20 TABLET ORAL ONCE
Status: DISCONTINUED | OUTPATIENT
Start: 2019-07-17 | End: 2019-07-17 | Stop reason: HOSPADM

## 2019-07-17 RX ORDER — PHENAZOPYRIDINE HYDROCHLORIDE 200 MG/1
200 TABLET, FILM COATED ORAL ONCE
Status: DISCONTINUED | OUTPATIENT
Start: 2019-07-17 | End: 2019-07-17

## 2019-07-17 RX ORDER — SULFAMETHOXAZOLE AND TRIMETHOPRIM 800; 160 MG/1; MG/1
1 TABLET ORAL 2 TIMES DAILY
Qty: 6 TABLET | Refills: 0 | Status: SHIPPED | OUTPATIENT
Start: 2019-07-18 | End: 2019-07-21

## 2019-07-17 RX ORDER — METOCLOPRAMIDE 10 MG/1
10 TABLET ORAL ONCE
Status: COMPLETED | OUTPATIENT
Start: 2019-07-17 | End: 2019-07-17

## 2019-07-17 RX ORDER — ONDANSETRON 2 MG/ML
INJECTION INTRAMUSCULAR; INTRAVENOUS AS NEEDED
Status: DISCONTINUED | OUTPATIENT
Start: 2019-07-17 | End: 2019-07-17 | Stop reason: SURG

## 2019-07-17 RX ORDER — LEVOFLOXACIN 5 MG/ML
500 INJECTION, SOLUTION INTRAVENOUS ONCE
Status: COMPLETED | OUTPATIENT
Start: 2019-07-17 | End: 2019-07-17

## 2019-07-17 RX ORDER — MORPHINE SULFATE 4 MG/ML
4 INJECTION, SOLUTION INTRAMUSCULAR; INTRAVENOUS EVERY 10 MIN PRN
Status: DISCONTINUED | OUTPATIENT
Start: 2019-07-17 | End: 2019-07-17

## 2019-07-17 RX ORDER — SODIUM CHLORIDE, SODIUM LACTATE, POTASSIUM CHLORIDE, CALCIUM CHLORIDE 600; 310; 30; 20 MG/100ML; MG/100ML; MG/100ML; MG/100ML
INJECTION, SOLUTION INTRAVENOUS CONTINUOUS
Status: DISCONTINUED | OUTPATIENT
Start: 2019-07-17 | End: 2019-07-17

## 2019-07-17 RX ORDER — HALOPERIDOL 5 MG/ML
0.25 INJECTION INTRAMUSCULAR ONCE AS NEEDED
Status: DISCONTINUED | OUTPATIENT
Start: 2019-07-17 | End: 2019-07-17

## 2019-07-17 RX ORDER — HYDROCODONE BITARTRATE AND ACETAMINOPHEN 5; 325 MG/1; MG/1
2 TABLET ORAL AS NEEDED
Status: DISCONTINUED | OUTPATIENT
Start: 2019-07-17 | End: 2019-07-17

## 2019-07-17 RX ORDER — HYDROMORPHONE HYDROCHLORIDE 1 MG/ML
0.4 INJECTION, SOLUTION INTRAMUSCULAR; INTRAVENOUS; SUBCUTANEOUS EVERY 5 MIN PRN
Status: DISCONTINUED | OUTPATIENT
Start: 2019-07-17 | End: 2019-07-17

## 2019-07-17 RX ADMIN — ONDANSETRON 4 MG: 2 INJECTION INTRAMUSCULAR; INTRAVENOUS at 09:35:00

## 2019-07-17 RX ADMIN — SODIUM CHLORIDE, SODIUM LACTATE, POTASSIUM CHLORIDE, CALCIUM CHLORIDE: 600; 310; 30; 20 INJECTION, SOLUTION INTRAVENOUS at 08:32:00

## 2019-07-17 RX ADMIN — LEVOFLOXACIN 500 MG: 5 INJECTION, SOLUTION INTRAVENOUS at 09:00:00

## 2019-07-17 RX ADMIN — DEXAMETHASONE SODIUM PHOSPHATE 4 MG: 4 MG/ML VIAL (ML) INJECTION at 09:12:00

## 2019-07-17 RX ADMIN — MIDAZOLAM HYDROCHLORIDE 2 MG: 1 INJECTION INTRAMUSCULAR; INTRAVENOUS at 09:03:00

## 2019-07-17 NOTE — ANESTHESIA PREPROCEDURE EVALUATION
Anesthesia PreOp Note    HPI:     Mesfin Beckham is a 59year old male who presents for preoperative consultation requested by:  Nadir Herron MD    Date of Surgery: 7/17/2019    Procedure(s):  CYSTOSCOPY  MEATOTOMY  URINARY BIOPSY  Indication: Congenit and left central chest         Medications Prior to Admission:  Lisinopril-hydroCHLOROthiazide 20-25 MG Oral Tab Take 1 tablet by mouth once daily.  Disp: 90 tablet Rfl: 1 7/16/2019 at 0600   NIFEdipine ER osmotic release 30 MG Oral Tablet 24 Hr Take 1 tabl Activity      Alcohol use: Yes        Comment: beer several times a week       Drug use: No      Sexual activity: Not Currently        Partners: Female    Lifestyle      Physical activity:        Days per week: Not on file        Minutes per session: Not o 07/01/2019    CO2 29.0 07/01/2019    BUN 19 (H) 07/01/2019    CREATSERUM 0.95 07/01/2019    GLU 94 07/01/2019    CA 9.0 07/01/2019          Vital Signs: Body mass index is 37.74 kg/m². height is 1.778 m (5' 10\") and weight is 119.3 kg (263 lb).  His ora

## 2019-07-17 NOTE — INTERVAL H&P NOTE
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   I d

## 2019-07-17 NOTE — ANESTHESIA PROCEDURE NOTES
Airway  Date/Time: 7/17/2019 9:08 AM  Urgency: elective    Airway not difficult    General Information and Staff    Patient location during procedure: OR  Anesthesiologist: Bora Alberto MD  Performed: anesthesiologist     Indications and Patient Condition

## 2019-07-17 NOTE — DISCHARGE SUMMARY
[facility]    Operative Note     Dejah Lebron Location: OR   [facility] [id number] MRN [id number]   Admission Date 7/17/2019 Operation Date 7/17/2019   Attending Physician Wenceslao Key MD Operating Physician Darren Spain MD      Preoperati suture was not needed to be placed. At this point I dilated using the curved male sounds with the wire in place up to 22 [place] in size with some resistance.   I was unable to place a flexible cystoscope and saw a diffuse stenosis of the fossa navicularis

## 2019-07-17 NOTE — H&P
SUBJECTIVE:  Robert Gamino is a 59year old male who presents for a consultation at the request of, and a copy of this note will be sent to, DrElie Hosteller Denver Silber, for evaluation of  Weak urinary stream. He states that the problem is unchanged.  Symptoms inc Tobacco Use      Smoking status: Former Smoker        Packs/day: 1.00        Years: 35.00        Pack years: 35        Types: Cigarettes      Smokeless tobacco: Never Used    Alcohol use: Yes      Comment: 2-3 beers daily    Drug use:  No               REVI diagnosis)     No orders of the defined types were placed in this encounter. Reviewed findings. Exam is notable for pronounced meatal stenosis likely accounting for his symptoms. Recommended cystoscopy under anesthesia, meatotomy.   Risks and possible si

## 2019-07-17 NOTE — TELEPHONE ENCOUNTER
Staff this patient needs to see me this coming Monday for Paul catheter removal.  Please also add him to the nursing schedule for a nurse visit immediately to follow to teach him to perform straight catheterizations once a day using a 14 [place] red rubber

## 2019-07-17 NOTE — TELEPHONE ENCOUNTER
Pt. States that he just had a proc done CYSTOSCOPY, MEATOTOMY, URETHRAL DILATION, at the [facility], and needs to sched f/up for Monday as early as possible, but the pt. Wants to know if the catheter can be removed this Fri.  Versus getting it out on Mon.,, as pt

## 2019-07-17 NOTE — ANESTHESIA POSTPROCEDURE EVALUATION
Patient: Frankie Engle    Procedure Summary     Date:  07/17/19 Room / Location:  [address] [place] [address] MAIN OR 14 / [address] [place] [address] MAIN OR    Anesthesia Start:  6076 Anesthesia Stop:      Procedures:       CYSTOSCOPY (N/A )      MEATOTOMY (N/A ) Diagnosis:       Congenital meatal

## 2019-07-18 NOTE — TELEPHONE ENCOUNTER
Spoke with pt and informed that I spoke with ELAINE to ask about the catheter being removed tomorrow and he feels that it would be best to wait till Monday as there could be swelling that could set in after the surgery and if the cath is removed too soon he m

## 2019-07-18 NOTE — TELEPHONE ENCOUNTER
Pt. Would like to know if he can get catheter removed tomorrow on Fri. 7/19/19? Pt. States that he has plane tickets to leave on Sat. Morning for his vacation. Pt states that if he misses his flight on Sat., it will be very expensive to leave on Monday.

## 2019-07-18 NOTE — TELEPHONE ENCOUNTER
Patient called stating he would like to speak with RN to see if catheter can be removed tomorrow. See other messages below.

## 2019-07-19 ENCOUNTER — TELEPHONE (OUTPATIENT)
Dept: SURGERY | Facility: CLINIC | Age: 64
End: 2019-07-19

## 2019-07-19 RX ORDER — PHENAZOPYRIDINE HYDROCHLORIDE 200 MG/1
200 TABLET, FILM COATED ORAL 3 TIMES DAILY PRN
Qty: 6 TABLET | Refills: 0 | OUTPATIENT
Start: 2019-07-19 | End: 2019-07-22

## 2019-07-19 RX ORDER — ACETAMINOPHEN AND CODEINE PHOSPHATE 300; 30 MG/1; MG/1
TABLET ORAL
Qty: 30 TABLET | Refills: 0 | OUTPATIENT
Start: 2019-07-19 | End: 2020-01-14

## 2019-07-19 NOTE — TELEPHONE ENCOUNTER
Patient c/o sensation of having to \"pee\" all the time, irritation at tip of penis where catheter exits making it difficulty for him to move while in bed. Patient states he is taking pyridium as directed, urine in martinez bag is bright orange, clear. Patient states he has only a few pyridium tablets left and would like about 6 more tablets. Patient states he is applying Aquaphor skin protectant to tip of his penis and this is helping with the irritation feeling. Patient is in agreement with catheter removal on Monday as planned. Patient states he agrees to try something for pain/discomfort. Per Dr. Lorena Johns verbal order, please call in tylenol with codeine #3 take 1-2 tablets by mouth every 4-6 hours as needed for pain #30, refill on pyridium 200 mg take 1 tab by mouth 3 times daily as needed for urinary discomfort #6, apply Aquaphor skin protectant to tip of penis as needed, follow up on Monday as scheduled. Rx are phoned in to patient's pharmacy. Dr. Brodie Lennon, ok? Then please just \"sign\" the orders below. Thanks. Patient contacted and is aware. All questions answered.

## 2019-07-19 NOTE — TELEPHONE ENCOUNTER
Pt called stating pt is having discomfort from the cath. And does not want to wait until 7-22-19 for an appt. Pt is unable to sleep.   Please call to advise

## 2019-07-22 ENCOUNTER — OFFICE VISIT (OUTPATIENT)
Dept: SURGERY | Facility: CLINIC | Age: 64
End: 2019-07-22
Payer: COMMERCIAL

## 2019-07-22 VITALS
SYSTOLIC BLOOD PRESSURE: 148 MMHG | HEART RATE: 96 BPM | BODY MASS INDEX: 38 KG/M2 | WEIGHT: 263 LBS | DIASTOLIC BLOOD PRESSURE: 99 MMHG

## 2019-07-22 DIAGNOSIS — Q64.33 CONGENITAL MEATAL STENOSIS: Primary | ICD-10-CM

## 2019-07-22 PROCEDURE — 99213 OFFICE O/P EST LOW 20 MIN: CPT | Performed by: UROLOGY

## 2019-07-22 NOTE — PROGRESS NOTES
Frankie Engle is a 59year old male.     HPI:   Patient presents with:  Surgical Followup: patient presents for f/u after meatotomy has catheter in place      81-year-old male status post meatotomy cystoscopy last week resents for Paul catheter removal. Current User      Tobacco comment: vapes several times daily    Alcohol use: Yes      Comment: beer several times a week     Drug use: No       Medications (Active prior to today's visit):    Current Outpatient Medications:  Acetaminophen-Codeine #3 300-30

## 2019-07-22 NOTE — PROGRESS NOTES
Patient's name and  verified. Urine in martinez bag noted to be clear yellow. Patient's martinez catheter balloon deflated, martinez gently removed. Patient tolerated procedure well.

## 2019-07-23 RX ORDER — LANSOPRAZOLE 30 MG/1
CAPSULE, DELAYED RELEASE ORAL
Qty: 90 CAPSULE | Refills: 1 | OUTPATIENT
Start: 2019-07-23

## 2019-08-09 ENCOUNTER — OFFICE VISIT (OUTPATIENT)
Dept: SURGERY | Facility: CLINIC | Age: 64
End: 2019-08-09
Payer: COMMERCIAL

## 2019-08-09 VITALS
HEIGHT: 70 IN | BODY MASS INDEX: 37.65 KG/M2 | WEIGHT: 263 LBS | HEART RATE: 98 BPM | SYSTOLIC BLOOD PRESSURE: 127 MMHG | TEMPERATURE: 98 F | DIASTOLIC BLOOD PRESSURE: 85 MMHG

## 2019-08-09 DIAGNOSIS — Q64.33 CONGENITAL MEATAL STENOSIS: Primary | ICD-10-CM

## 2019-08-09 PROCEDURE — 99213 OFFICE O/P EST LOW 20 MIN: CPT | Performed by: UROLOGY

## 2019-08-09 PROCEDURE — 53601 DILATE URETHRA STRICTURE: CPT | Performed by: UROLOGY

## 2019-08-09 NOTE — PROGRESS NOTES
Was asked by  to teach pt cic, for purpose of urethral dialation. Pt was already verbally instructed, that he will need to place the 14fr straight tip ,red rubber catheter very deep, by  d/t the level of srtictures, during time of dilitation.

## 2019-08-09 NOTE — PROGRESS NOTES
Janina Silver is a 62year old male. HPI:   Patient presents with:   Follow - Up: congenital meatal stenois, S/P meatotomy 7/17/19, states he is urinating ok with some spraying      60-year-old male status post meatotomy, cystoscopy dilation performed Maternal Grandfather    • Other (Alzheimers) Mother    • Cancer Maternal Grandfather         Details unknown      Social History: Social History    Tobacco Use      Smoking status: Former Smoker        Packs/day: 1.00        Years: 35.00        Pack years: Visit:  Requested Prescriptions      No prescriptions requested or ordered in this encounter       Imaging & Referrals:  None     8/9/2019  Gorge Mccullough MD

## 2019-08-27 NOTE — OPERATIVE REPORT
[facility]    Operative Note     Red Contes Location: OR   CSN [id number] MRN [id number]   Admission Date 7/17/2019 Operation Date 8/27/2019   Attending Physician No att. providers found Operating Physician Vazquez Fierro MD      Preop diverticuli or cellules. There were no suspicious bladder masses or erythema. Ureteral orifices were normal in location and appearance. The cystoscope was then removed. Over the previously placed wire, a 16 [facility] tip Paul catheter was placed.

## 2019-09-18 ENCOUNTER — TELEPHONE (OUTPATIENT)
Dept: SURGERY | Facility: CLINIC | Age: 64
End: 2019-09-18

## 2019-09-18 NOTE — TELEPHONE ENCOUNTER
Received fax from [address] [place] [address] for detailed order for cath supplies. Order placed on MD's desk.

## 2019-09-19 NOTE — TELEPHONE ENCOUNTER
Order signed by MD. Alfredito Galindo to [address] [place] [address]. Confirmation received copy sent to scanning.

## 2019-10-07 ENCOUNTER — OFFICE VISIT (OUTPATIENT)
Dept: SURGERY | Facility: CLINIC | Age: 64
End: 2019-10-07
Payer: COMMERCIAL

## 2019-10-07 VITALS
SYSTOLIC BLOOD PRESSURE: 115 MMHG | DIASTOLIC BLOOD PRESSURE: 76 MMHG | WEIGHT: 260 LBS | HEART RATE: 94 BPM | BODY MASS INDEX: 37 KG/M2 | TEMPERATURE: 98 F

## 2019-10-07 DIAGNOSIS — R39.12 WEAK URINARY STREAM: ICD-10-CM

## 2019-10-07 DIAGNOSIS — Q64.33 CONGENITAL MEATAL STENOSIS: Primary | ICD-10-CM

## 2019-10-07 PROCEDURE — 99213 OFFICE O/P EST LOW 20 MIN: CPT | Performed by: UROLOGY

## 2019-10-07 NOTE — PROGRESS NOTES
Douglas Lees is a 62year old male. HPI:   Patient presents with: Follow - Up: PT preesnts for 6 week follow up. States symptoms are stable. 60-year-old male in follow-up to a previous visit August 9, 2019.   He status post meatotomy cystoscop Diabetes Maternal Grandfather    • Other (Alzheimers) Mother    • Cancer Maternal Grandfather         Details unknown      Social History: Social History    Tobacco Use      Smoking status: Former Smoker        Packs/day: 1.00        Years: 35.00        Pa

## 2019-12-26 RX ORDER — LISINOPRIL AND HYDROCHLOROTHIAZIDE 25; 20 MG/1; MG/1
TABLET ORAL
Qty: 90 TABLET | Refills: 1 | Status: SHIPPED | OUTPATIENT
Start: 2019-12-26 | End: 2020-06-28

## 2019-12-26 RX ORDER — NIFEDIPINE 30 MG/1
TABLET, EXTENDED RELEASE ORAL
Qty: 90 TABLET | Refills: 1 | Status: SHIPPED | OUTPATIENT
Start: 2019-12-26 | End: 2020-06-28

## 2019-12-26 RX ORDER — MECOBALAMIN 5000 MCG
15 TABLET,DISINTEGRATING ORAL DAILY
Qty: 90 CAPSULE | Refills: 1 | Status: SHIPPED | OUTPATIENT
Start: 2019-12-26 | End: 2020-06-28

## 2020-01-14 ENCOUNTER — APPOINTMENT (OUTPATIENT)
Dept: LAB | Facility: HOSPITAL | Age: 65
End: 2020-01-14
Attending: INTERNAL MEDICINE
Payer: COMMERCIAL

## 2020-01-14 ENCOUNTER — OFFICE VISIT (OUTPATIENT)
Dept: INTERNAL MEDICINE CLINIC | Facility: CLINIC | Age: 65
End: 2020-01-14
Payer: COMMERCIAL

## 2020-01-14 VITALS
SYSTOLIC BLOOD PRESSURE: 132 MMHG | BODY MASS INDEX: 37.8 KG/M2 | TEMPERATURE: 98 F | RESPIRATION RATE: 20 BRPM | HEART RATE: 80 BPM | HEIGHT: 70 IN | DIASTOLIC BLOOD PRESSURE: 84 MMHG | WEIGHT: 264 LBS

## 2020-01-14 DIAGNOSIS — E78.00 HYPERCHOLESTEROLEMIA: ICD-10-CM

## 2020-01-14 DIAGNOSIS — K21.9 GASTROESOPHAGEAL REFLUX DISEASE, ESOPHAGITIS PRESENCE NOT SPECIFIED: ICD-10-CM

## 2020-01-14 DIAGNOSIS — C44.99 RECURRENT SKIN CANCER: ICD-10-CM

## 2020-01-14 DIAGNOSIS — Z00.00 ANNUAL PHYSICAL EXAM: ICD-10-CM

## 2020-01-14 DIAGNOSIS — Z00.00 ANNUAL PHYSICAL EXAM: Primary | ICD-10-CM

## 2020-01-14 DIAGNOSIS — I10 ESSENTIAL HYPERTENSION: ICD-10-CM

## 2020-01-14 LAB
ALBUMIN SERPL-MCNC: 3.5 G/DL (ref 3.4–5)
ALBUMIN/GLOB SERPL: 0.9 {RATIO} (ref 1–2)
ALP LIVER SERPL-CCNC: 81 U/L (ref 45–117)
ALT SERPL-CCNC: 43 U/L (ref 16–61)
ANION GAP SERPL CALC-SCNC: 6 MMOL/L (ref 0–18)
AST SERPL-CCNC: 19 U/L (ref 15–37)
BILIRUB SERPL-MCNC: 0.4 MG/DL (ref 0.1–2)
BUN BLD-MCNC: 19 MG/DL (ref 7–18)
BUN/CREAT SERPL: 16 (ref 10–20)
CALCIUM BLD-MCNC: 8.3 MG/DL (ref 8.5–10.1)
CHLORIDE SERPL-SCNC: 107 MMOL/L (ref 98–112)
CHOLEST SMN-MCNC: 188 MG/DL (ref ?–200)
CO2 SERPL-SCNC: 27 MMOL/L (ref 21–32)
COMPLEXED PSA SERPL-MCNC: 1.49 NG/ML (ref ?–4)
CREAT BLD-MCNC: 1.19 MG/DL (ref 0.7–1.3)
DEPRECATED RDW RBC AUTO: 42 FL (ref 35.1–46.3)
ERYTHROCYTE [DISTWIDTH] IN BLOOD BY AUTOMATED COUNT: 13 % (ref 11–15)
GLOBULIN PLAS-MCNC: 3.8 G/DL (ref 2.8–4.4)
GLUCOSE BLD-MCNC: 94 MG/DL (ref 70–99)
HCT VFR BLD AUTO: 45.7 % (ref 39–53)
HDLC SERPL-MCNC: 50 MG/DL (ref 40–59)
HGB BLD-MCNC: 15.3 G/DL (ref 13–17.5)
LDLC SERPL CALC-MCNC: 119 MG/DL (ref ?–100)
M PROTEIN MFR SERPL ELPH: 7.3 G/DL (ref 6.4–8.2)
MCH RBC QN AUTO: 29.5 PG (ref 26–34)
MCHC RBC AUTO-ENTMCNC: 33.5 G/DL (ref 31–37)
MCV RBC AUTO: 88.1 FL (ref 80–100)
NONHDLC SERPL-MCNC: 138 MG/DL (ref ?–130)
OSMOLALITY SERPL CALC.SUM OF ELEC: 292 MOSM/KG (ref 275–295)
PATIENT FASTING Y/N/NP: YES
PATIENT FASTING Y/N/NP: YES
PLATELET # BLD AUTO: 321 10(3)UL (ref 150–450)
POTASSIUM SERPL-SCNC: 4 MMOL/L (ref 3.5–5.1)
RBC # BLD AUTO: 5.19 X10(6)UL (ref 4.3–5.7)
SODIUM SERPL-SCNC: 140 MMOL/L (ref 136–145)
TRIGL SERPL-MCNC: 93 MG/DL (ref 30–149)
VLDLC SERPL CALC-MCNC: 19 MG/DL (ref 0–30)
WBC # BLD AUTO: 5.6 X10(3) UL (ref 4–11)

## 2020-01-14 PROCEDURE — 80053 COMPREHEN METABOLIC PANEL: CPT

## 2020-01-14 PROCEDURE — 99396 PREV VISIT EST AGE 40-64: CPT | Performed by: INTERNAL MEDICINE

## 2020-01-14 PROCEDURE — 80061 LIPID PANEL: CPT

## 2020-01-14 PROCEDURE — 36415 COLL VENOUS BLD VENIPUNCTURE: CPT

## 2020-01-14 PROCEDURE — 85027 COMPLETE CBC AUTOMATED: CPT

## 2020-01-14 NOTE — PATIENT INSTRUCTIONS
Await results of today's blood tests. Please complete the FIT test.  Continue current medication. Please try to follow a healthy diet, exercise regularly and lose weight. Please schedule an appointment with Dermatology. Return visit in 6 months.

## 2020-01-14 NOTE — H&P
Demarcus Pickett is a 62year old male who presents for a complete physical exam.   HPI:   His last physical was November 2018. Lately he has been feeling well. No specific issues for discussion. He continues to work for the [employer].   [facility] impairment     both ears   • Hypercholesterolemia    • Hypertension    • Recurrent skin cancer       Past Surgical History:   Procedure Laterality Date   • CYSTOSCOPY N/A 7/17/2019    Performed by Nadir Herron MD at [facility] OR   • ELBOW SURGERY Right 1 264 lb (119.7 kg)   BMI 37.88 kg/m²   HEENT: Anicteric, conjunctiva pink, oropharynx normal  NECK: Supple without mass or thyromegaly  NODES: No peripheral adenopathy  LUNGS: Resonant to percussion and clear to auscultation  CARDIAC: Rhythm regular S1 S2 n

## 2020-01-17 ENCOUNTER — APPOINTMENT (OUTPATIENT)
Dept: LAB | Facility: HOSPITAL | Age: 65
End: 2020-01-17
Attending: INTERNAL MEDICINE
Payer: COMMERCIAL

## 2020-01-17 LAB — HEMOCCULT STL QL: NEGATIVE

## 2020-01-17 PROCEDURE — 82274 ASSAY TEST FOR BLOOD FECAL: CPT

## 2020-01-18 ENCOUNTER — OFFICE VISIT (OUTPATIENT)
Dept: INTERNAL MEDICINE CLINIC | Facility: CLINIC | Age: 65
End: 2020-01-18
Payer: COMMERCIAL

## 2020-01-18 VITALS
HEIGHT: 70 IN | WEIGHT: 262 LBS | HEART RATE: 87 BPM | BODY MASS INDEX: 37.51 KG/M2 | DIASTOLIC BLOOD PRESSURE: 82 MMHG | SYSTOLIC BLOOD PRESSURE: 134 MMHG | RESPIRATION RATE: 18 BRPM

## 2020-01-18 DIAGNOSIS — H00.014 HORDEOLUM EXTERNUM OF LEFT UPPER EYELID: Primary | ICD-10-CM

## 2020-01-18 PROCEDURE — 99213 OFFICE O/P EST LOW 20 MIN: CPT | Performed by: INTERNAL MEDICINE

## 2020-01-18 RX ORDER — SULFAMETHOXAZOLE AND TRIMETHOPRIM 800; 160 MG/1; MG/1
1 TABLET ORAL 2 TIMES DAILY
Qty: 6 TABLET | Refills: 0 | Status: SHIPPED | OUTPATIENT
Start: 2020-01-18 | End: 2020-01-21

## 2020-01-18 RX ORDER — TOBRAMYCIN 3 MG/ML
2 SOLUTION/ DROPS OPHTHALMIC EVERY 6 HOURS
Qty: 1 BOTTLE | Refills: 0 | Status: SHIPPED | OUTPATIENT
Start: 2020-01-18 | End: 2020-01-25

## 2020-01-18 NOTE — PATIENT INSTRUCTIONS
Please apply a warm compress to your left upper eyelid 3-4 times daily. Take generic Bactrim twice daily for 3 days. Use tobramycin eyedrops 2 drops left eye 4 times daily for 7 days. Call if no better.

## 2020-01-18 NOTE — PROGRESS NOTES
Andrew Eduardo is a 62year old male.  Patient presents with:  Eye Problem: left eye swelling x4 days    HPI:   Since his recent visit earlier this week, he has had progressive redness and swelling along with occasional minimal discharge from his left upp BCCa left parietal scalp   • OTHER  12/2017    Excision BCCa left lower cheek and left central chest   • OTHER  07/2019    Cystoscopy, meatotomy, urethral dilatation      Social History:  Social History    Tobacco Use      Smoking status: Former Smoker

## 2020-04-02 ENCOUNTER — TELEPHONE (OUTPATIENT)
Dept: SURGERY | Facility: CLINIC | Age: 65
End: 2020-04-02

## 2020-04-02 NOTE — TELEPHONE ENCOUNTER
Spoke with patient. Due to the recent covid-19 crisis we are reaching out to patient if they would like to reschedule their appointment or have visit over a telephone encounter. PT states he would like to reschedule. Assisted in rescheduling appt.  PT confir

## 2020-05-12 ENCOUNTER — TELEPHONE (OUTPATIENT)
Dept: SURGERY | Facility: CLINIC | Age: 65
End: 2020-05-12

## 2020-05-12 NOTE — TELEPHONE ENCOUNTER
Called Patient to assist on scheduling his appoinment as a video Visit due to the Covid-19 crisis. Patient understands but states he would rather reschedule.

## 2020-06-20 ENCOUNTER — PATIENT MESSAGE (OUTPATIENT)
Dept: DERMATOLOGY CLINIC | Facility: CLINIC | Age: 65
End: 2020-06-20

## 2020-06-22 NOTE — TELEPHONE ENCOUNTER
From: David Rosa  To: Yarely Borrego MD  Sent: 6/20/2020 3:49 PM CDT  Subject: Other    I recently noticed a bump on the top of my head. Think it may be another basal cell problem. Are you accepting appointments?   Thank you  Tammi Click

## 2020-06-23 NOTE — TELEPHONE ENCOUNTER
Pt added to 6/24/20 schedule, he wants to keep July appt for now in case he needs it for something else.  Screening for covid done - pt answered no to all questions

## 2020-06-23 NOTE — TELEPHONE ENCOUNTER
Okay to add in.   If patient thinks this is a skin cancer then we will need to come in for an appointment

## 2020-06-24 ENCOUNTER — OFFICE VISIT (OUTPATIENT)
Dept: DERMATOLOGY CLINIC | Facility: CLINIC | Age: 65
End: 2020-06-24
Payer: COMMERCIAL

## 2020-06-24 DIAGNOSIS — D23.4 BENIGN NEOPLASM OF SCALP AND SKIN OF NECK: ICD-10-CM

## 2020-06-24 DIAGNOSIS — L57.0 AK (ACTINIC KERATOSIS): ICD-10-CM

## 2020-06-24 DIAGNOSIS — D23.60 BENIGN NEOPLASM OF SKIN OF UPPER LIMB, INCLUDING SHOULDER, UNSPECIFIED LATERALITY: ICD-10-CM

## 2020-06-24 DIAGNOSIS — D23.30 BENIGN NEOPLASM OF SKIN OF FACE: ICD-10-CM

## 2020-06-24 DIAGNOSIS — L82.1 SEBORRHEIC KERATOSES: ICD-10-CM

## 2020-06-24 DIAGNOSIS — Z85.828 HISTORY OF SKIN CANCER: ICD-10-CM

## 2020-06-24 DIAGNOSIS — D48.5 NEOPLASM OF UNCERTAIN BEHAVIOR OF SKIN: Primary | ICD-10-CM

## 2020-06-24 PROCEDURE — 88305 TISSUE EXAM BY PATHOLOGIST: CPT | Performed by: DERMATOLOGY

## 2020-06-24 PROCEDURE — 11102 TANGNTL BX SKIN SINGLE LES: CPT | Performed by: DERMATOLOGY

## 2020-06-24 PROCEDURE — 99213 OFFICE O/P EST LOW 20 MIN: CPT | Performed by: DERMATOLOGY

## 2020-06-27 NOTE — PROGRESS NOTES
Patient informed of test results and all KMT's recommendations. Voiced understanding.  Results logged in

## 2020-06-27 NOTE — PROGRESS NOTES
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 -No malignancy seen. Please log in test results.   Please call patient and inform of results and recommendation

## 2020-06-28 RX ORDER — MECOBALAMIN 5000 MCG
TABLET,DISINTEGRATING ORAL
Qty: 90 CAPSULE | Refills: 0 | Status: SHIPPED | OUTPATIENT
Start: 2020-06-28 | End: 2020-09-27

## 2020-06-28 RX ORDER — NIFEDIPINE 30 MG/1
TABLET, EXTENDED RELEASE ORAL
Qty: 90 TABLET | Refills: 0 | Status: SHIPPED | OUTPATIENT
Start: 2020-06-28 | End: 2020-09-27

## 2020-06-28 RX ORDER — LISINOPRIL AND HYDROCHLOROTHIAZIDE 25; 20 MG/1; MG/1
TABLET ORAL
Qty: 90 TABLET | Refills: 0 | Status: SHIPPED | OUTPATIENT
Start: 2020-06-28 | End: 2020-09-27

## 2020-06-28 NOTE — PROGRESS NOTES
Demarcus Pickett is a 62year old male. HPI:     CC:  Patient presents with:  Actinic Keratosis: LOV 2/22/219. pt presenting today with Ak f/u. Finished 6 week course of Aldara. pt states new lesion to top of scalp for 3 weeks. Denies pain or itching.  pt Smokeless tobacco: Never Used    Alcohol use: Yes      Comment: 1-2 beers or glasses of wine daily    Drug use: No       Current Outpatient Medications   Medication Sig Dispense Refill   • NIFEDIPINE ER OSMOTIC RELEASE 30 MG Oral Tablet 24 Hr TAKE 1 TABLET Not on file    Social Needs      Financial resource strain: Not on file      Food insecurity:        Worry: Not on file        Inability: Not on file      Transportation needs:        Medical: Not on file        Non-medical: Not on file    Tobacco Use local anesthetic: No    Social History Narrative      Not on file    Family History   Problem Relation Age of Onset   • Heart Disease Father          MI age 68   • Other (ALS) Father    • Heart Disease Paternal Uncle          MI age 62s   • [de-identified] papules scattered. See map today's date for lesions noted . Otherwise remarkable for lesions as noted on map.   See details of examination  See Assessment /Plan for additional history and physical exam also:    Assessment / plan:    Orders Placed Th resolution of symptoms. Plan recheck in one month after completion of treatment course. Consider alternative treatment biopsy if not resolved.   Will use to AK's, probable BCC left postauricular crease    Other benign nevi lentigines  Please refer to map

## 2020-06-28 NOTE — PROGRESS NOTES
Operative Report                     Shave/  Tangential biopsy     Clinical diagnosis:    Size of lesion:    Location:Diagnosis/Clinical History: pt with hx bcc new nodule  Spec 1 Description >>>>>: right lateral vertex  Spec 1 Comment: r/o BCC vs ot

## 2020-07-26 ENCOUNTER — PATIENT MESSAGE (OUTPATIENT)
Dept: SURGERY | Facility: CLINIC | Age: 65
End: 2020-07-26

## 2020-07-30 NOTE — TELEPHONE ENCOUNTER
From: Luis Fernando Collazo  To: Vazquez Fierro MD  Sent: 7/26/2020 11:49 AM CDT  Subject: Non-Urgent Medical Question    Dear Dr Kylah Reina,   Thank you for last years surgery, things are going well, I use catheters 3 times per week to keep things clear.   I am req

## 2020-08-12 ENCOUNTER — TELEPHONE (OUTPATIENT)
Dept: SURGERY | Facility: CLINIC | Age: 65
End: 2020-08-12

## 2020-08-12 NOTE — TELEPHONE ENCOUNTER
Yassine from [facility] needs a signature on the supply form.   Please  Fax to [phone]

## 2020-08-26 NOTE — TELEPHONE ENCOUNTER
Received fax from [address] [place] [address] for detailed order for cath supplies. Order placed on MD's desk.

## 2020-09-10 NOTE — TELEPHONE ENCOUNTER
Late entry. Order signed by MD 8/27/20. Faxed to [address] [place] [address] 9/1/20, confirmation received. Copy sent to scanning.

## 2020-09-27 RX ORDER — LANSOPRAZOLE 15 MG/1
CAPSULE, DELAYED RELEASE ORAL
Qty: 90 CAPSULE | Refills: 0 | Status: SHIPPED | OUTPATIENT
Start: 2020-09-27 | End: 2021-01-14

## 2020-09-27 RX ORDER — LISINOPRIL AND HYDROCHLOROTHIAZIDE 25; 20 MG/1; MG/1
TABLET ORAL
Qty: 90 TABLET | Refills: 0 | Status: SHIPPED | OUTPATIENT
Start: 2020-09-27 | End: 2021-01-14

## 2020-09-27 RX ORDER — NIFEDIPINE 30 MG/1
TABLET, EXTENDED RELEASE ORAL
Qty: 90 TABLET | Refills: 0 | Status: SHIPPED | OUTPATIENT
Start: 2020-09-27 | End: 2021-01-14

## 2020-09-29 ENCOUNTER — OFFICE VISIT (OUTPATIENT)
Dept: INTERNAL MEDICINE CLINIC | Facility: CLINIC | Age: 65
End: 2020-09-29
Payer: COMMERCIAL

## 2020-09-29 ENCOUNTER — OFFICE VISIT (OUTPATIENT)
Dept: SURGERY | Facility: CLINIC | Age: 65
End: 2020-09-29
Payer: COMMERCIAL

## 2020-09-29 VITALS
DIASTOLIC BLOOD PRESSURE: 88 MMHG | BODY MASS INDEX: 38 KG/M2 | WEIGHT: 262 LBS | HEART RATE: 89 BPM | SYSTOLIC BLOOD PRESSURE: 130 MMHG

## 2020-09-29 VITALS
DIASTOLIC BLOOD PRESSURE: 78 MMHG | BODY MASS INDEX: 38.14 KG/M2 | WEIGHT: 266.38 LBS | HEIGHT: 70 IN | HEART RATE: 86 BPM | SYSTOLIC BLOOD PRESSURE: 122 MMHG

## 2020-09-29 DIAGNOSIS — Q64.33 CONGENITAL MEATAL STENOSIS: Primary | ICD-10-CM

## 2020-09-29 DIAGNOSIS — N52.9 ERECTILE DYSFUNCTION, UNSPECIFIED ERECTILE DYSFUNCTION TYPE: ICD-10-CM

## 2020-09-29 DIAGNOSIS — I10 ESSENTIAL HYPERTENSION: Primary | ICD-10-CM

## 2020-09-29 PROCEDURE — 3074F SYST BP LT 130 MM HG: CPT | Performed by: INTERNAL MEDICINE

## 2020-09-29 PROCEDURE — 99214 OFFICE O/P EST MOD 30 MIN: CPT | Performed by: UROLOGY

## 2020-09-29 PROCEDURE — 3078F DIAST BP <80 MM HG: CPT | Performed by: INTERNAL MEDICINE

## 2020-09-29 PROCEDURE — 3008F BODY MASS INDEX DOCD: CPT | Performed by: INTERNAL MEDICINE

## 2020-09-29 PROCEDURE — 3075F SYST BP GE 130 - 139MM HG: CPT | Performed by: UROLOGY

## 2020-09-29 PROCEDURE — 99213 OFFICE O/P EST LOW 20 MIN: CPT | Performed by: INTERNAL MEDICINE

## 2020-09-29 PROCEDURE — 3079F DIAST BP 80-89 MM HG: CPT | Performed by: UROLOGY

## 2020-09-29 RX ORDER — SILDENAFIL 100 MG/1
100 TABLET, FILM COATED ORAL
Qty: 4 TABLET | Refills: 11 | Status: SHIPPED | OUTPATIENT
Start: 2020-09-29

## 2020-09-29 NOTE — PATIENT INSTRUCTIONS
Your blood pressure today was well controlled. Please get a flu shot soon. Continue current medication. Continue healthy diet and regular exercise. Physical with labs in January.

## 2020-09-29 NOTE — PROGRESS NOTES
Peter Kat is a 60year old male. HPI:   Patient presents with:   Follow - Up: patient preseents for f/u states he is still catherizing is doing it 6 times per week      79-year-old male in follow-up to visit October 7, 2019 with a history of meata OTHER  July 2016    Excision BCCa upper back   • OTHER  08/2017    Excision BCCa left parietal scalp   • OTHER  12/2017    Excision BCCa left lower cheek and left central chest   • OTHER  07/2019    Cystoscopy, meatotomy, urethral dilatation      Family Hi intermittent catheterization as he has been doing. – Follow-up otherwise in 1 year. I spent a total of 25 minutes with patient more than half the time in face-to-face discussion.          Orders This Visit:  No orders of the defined types were placed in

## 2020-09-29 NOTE — PROGRESS NOTES
Jaqui Cheatham is a 60year old male. Patient presents with:  Hypertension  Hypercholesterolemia    HPI:   Mr. Zoë Russo presents this morning for follow-up of hypertension. Lately he has been feeling well.   He has not been working at the [employer] at [address] [place] [address] MAIN OR   • OTHER  September 2008    Excision BCCa left temporal scalp   • OTHER  November 2008    Mohs surgery BCCa hairline   • OTHER  July 2016    Excision BCCa upper back   • OTHER  08/2017    Excision BCCa left parietal scalp   • OTHER  12/2

## 2021-01-14 RX ORDER — MECOBALAMIN 5000 MCG
TABLET,DISINTEGRATING ORAL
Qty: 90 CAPSULE | Refills: 0 | Status: SHIPPED | OUTPATIENT
Start: 2021-01-14 | End: 2021-04-16

## 2021-01-14 RX ORDER — NIFEDIPINE 30 MG/1
TABLET, EXTENDED RELEASE ORAL
Qty: 90 TABLET | Refills: 0 | Status: SHIPPED | OUTPATIENT
Start: 2021-01-14 | End: 2021-04-16

## 2021-01-14 RX ORDER — LISINOPRIL AND HYDROCHLOROTHIAZIDE 25; 20 MG/1; MG/1
TABLET ORAL
Qty: 90 TABLET | Refills: 0 | Status: SHIPPED | OUTPATIENT
Start: 2021-01-14 | End: 2021-04-16

## 2021-04-16 RX ORDER — LISINOPRIL AND HYDROCHLOROTHIAZIDE 25; 20 MG/1; MG/1
TABLET ORAL
Qty: 90 TABLET | Refills: 0 | Status: SHIPPED | OUTPATIENT
Start: 2021-04-16 | End: 2021-06-24

## 2021-04-16 RX ORDER — NIFEDIPINE 30 MG/1
TABLET, EXTENDED RELEASE ORAL
Qty: 90 TABLET | Refills: 0 | Status: SHIPPED | OUTPATIENT
Start: 2021-04-16 | End: 2021-06-24

## 2021-04-16 RX ORDER — MECOBALAMIN 5000 MCG
TABLET,DISINTEGRATING ORAL
Qty: 90 CAPSULE | Refills: 0 | Status: SHIPPED | OUTPATIENT
Start: 2021-04-16 | End: 2021-06-24

## 2021-04-27 ENCOUNTER — OFFICE VISIT (OUTPATIENT)
Dept: INTERNAL MEDICINE CLINIC | Facility: CLINIC | Age: 66
End: 2021-04-27
Payer: COMMERCIAL

## 2021-04-27 ENCOUNTER — LAB ENCOUNTER (OUTPATIENT)
Dept: LAB | Facility: HOSPITAL | Age: 66
End: 2021-04-27
Attending: INTERNAL MEDICINE
Payer: COMMERCIAL

## 2021-04-27 VITALS
DIASTOLIC BLOOD PRESSURE: 78 MMHG | SYSTOLIC BLOOD PRESSURE: 134 MMHG | BODY MASS INDEX: 39.07 KG/M2 | HEIGHT: 70 IN | WEIGHT: 272.88 LBS | HEART RATE: 97 BPM

## 2021-04-27 DIAGNOSIS — E78.00 HYPERCHOLESTEROLEMIA: ICD-10-CM

## 2021-04-27 DIAGNOSIS — C44.99 RECURRENT SKIN CANCER: ICD-10-CM

## 2021-04-27 DIAGNOSIS — K21.9 GASTROESOPHAGEAL REFLUX DISEASE, UNSPECIFIED WHETHER ESOPHAGITIS PRESENT: ICD-10-CM

## 2021-04-27 DIAGNOSIS — Z00.00 ANNUAL PHYSICAL EXAM: ICD-10-CM

## 2021-04-27 DIAGNOSIS — I10 ESSENTIAL HYPERTENSION: ICD-10-CM

## 2021-04-27 DIAGNOSIS — Z00.00 ANNUAL PHYSICAL EXAM: Primary | ICD-10-CM

## 2021-04-27 PROCEDURE — 80053 COMPREHEN METABOLIC PANEL: CPT

## 2021-04-27 PROCEDURE — 85027 COMPLETE CBC AUTOMATED: CPT

## 2021-04-27 PROCEDURE — 99396 PREV VISIT EST AGE 40-64: CPT | Performed by: INTERNAL MEDICINE

## 2021-04-27 PROCEDURE — 3078F DIAST BP <80 MM HG: CPT | Performed by: INTERNAL MEDICINE

## 2021-04-27 PROCEDURE — 80061 LIPID PANEL: CPT

## 2021-04-27 PROCEDURE — 36415 COLL VENOUS BLD VENIPUNCTURE: CPT

## 2021-04-27 PROCEDURE — 3008F BODY MASS INDEX DOCD: CPT | Performed by: INTERNAL MEDICINE

## 2021-04-27 PROCEDURE — 3075F SYST BP GE 130 - 139MM HG: CPT | Performed by: INTERNAL MEDICINE

## 2021-04-27 NOTE — PATIENT INSTRUCTIONS
Your blood pressure today was good at 134/78. Await results of today's blood tests, and complete the stool test for colon cancer screening. Remember to get a flu shot this fall. You should also get 2 doses of Shingrix vaccine at your pharmacy.   Continue

## 2021-04-27 NOTE — H&P
Luis Fernando Collazo is a 60year old male who presents for a complete physical exam.   HPI:   His last physical was January 2020. He feels well. No specific issues for discussion. He lives with his wife.   Currently unemployed although he anticipates resumi disease)    • Hearing impairment     both ears   • Hypercholesterolemia    • Hypertension    • Recurrent skin cancer       Past Surgical History:   Procedure Laterality Date   • ELBOW SURGERY Right 1971   • OTHER  September 2008    Excision BCCa left tempo to auscultation  CARDIAC: Rhythm regular S1 S2 normal without murmur, with trace-1+ pitting edema in the distal lower extremities bilaterally  ABDOMEN: Bowel sounds normal soft nontender without mass or hepatosplenomegaly  EXTREMITIES: Normal without cyano

## 2021-05-20 ENCOUNTER — LAB ENCOUNTER (OUTPATIENT)
Dept: LAB | Facility: HOSPITAL | Age: 66
End: 2021-05-20
Attending: INTERNAL MEDICINE
Payer: COMMERCIAL

## 2021-05-20 DIAGNOSIS — Z00.00 ANNUAL PHYSICAL EXAM: ICD-10-CM

## 2021-05-20 DIAGNOSIS — R97.20 ABNORMAL PSA: ICD-10-CM

## 2021-05-20 PROCEDURE — 36415 COLL VENOUS BLD VENIPUNCTURE: CPT

## 2021-05-20 PROCEDURE — 82274 ASSAY TEST FOR BLOOD FECAL: CPT

## 2021-05-20 PROCEDURE — 84153 ASSAY OF PSA TOTAL: CPT

## 2021-06-07 ENCOUNTER — TELEPHONE (OUTPATIENT)
Dept: SURGERY | Facility: CLINIC | Age: 66
End: 2021-06-07

## 2021-06-07 NOTE — TELEPHONE ENCOUNTER
Incoming fax from [address] [place] [address]. Written order for catheter supplies, I will place on Dr. Luis Rader desk to be signed and faxed back. I will also send a copy of the order to be scanned into the pt's chart.

## 2021-06-24 RX ORDER — NIFEDIPINE 30 MG/1
30 TABLET, EXTENDED RELEASE ORAL DAILY
Qty: 90 TABLET | Refills: 1 | Status: SHIPPED | OUTPATIENT
Start: 2021-06-24 | End: 2022-01-04

## 2021-06-24 RX ORDER — LISINOPRIL AND HYDROCHLOROTHIAZIDE 25; 20 MG/1; MG/1
1 TABLET ORAL DAILY
Qty: 90 TABLET | Refills: 1 | Status: SHIPPED | OUTPATIENT
Start: 2021-06-24 | End: 2021-12-23

## 2021-06-24 RX ORDER — LANSOPRAZOLE 15 MG/1
15 CAPSULE, DELAYED RELEASE ORAL DAILY
Qty: 90 CAPSULE | Refills: 1 | Status: SHIPPED | OUTPATIENT
Start: 2021-06-24 | End: 2022-01-04

## 2021-06-25 ENCOUNTER — OFFICE VISIT (OUTPATIENT)
Dept: DERMATOLOGY CLINIC | Facility: CLINIC | Age: 66
End: 2021-06-25
Payer: COMMERCIAL

## 2021-06-25 DIAGNOSIS — L82.1 SEBORRHEIC KERATOSES: ICD-10-CM

## 2021-06-25 DIAGNOSIS — L57.0 AK (ACTINIC KERATOSIS): Primary | ICD-10-CM

## 2021-06-25 DIAGNOSIS — D23.4 BENIGN NEOPLASM OF SCALP AND SKIN OF NECK: ICD-10-CM

## 2021-06-25 DIAGNOSIS — D23.30 BENIGN NEOPLASM OF SKIN OF FACE: ICD-10-CM

## 2021-06-25 DIAGNOSIS — Z85.828 HISTORY OF SKIN CANCER: ICD-10-CM

## 2021-06-25 DIAGNOSIS — D23.60 BENIGN NEOPLASM OF SKIN OF UPPER LIMB, INCLUDING SHOULDER, UNSPECIFIED LATERALITY: ICD-10-CM

## 2021-06-25 PROCEDURE — 99213 OFFICE O/P EST LOW 20 MIN: CPT | Performed by: DERMATOLOGY

## 2021-06-25 PROCEDURE — 17003 DESTRUCT PREMALG LES 2-14: CPT | Performed by: DERMATOLOGY

## 2021-06-25 PROCEDURE — 17000 DESTRUCT PREMALG LESION: CPT | Performed by: DERMATOLOGY

## 2021-07-05 NOTE — PROGRESS NOTES
Alberto Mcclelland is a 60year old male.   HPI:     CC:  Patient presents with:  Upper Body Exam: pt is here for upper body check, denies family HX of skin cancer, personal HX of BCC, LOV 6/24/2020        Allergies:  Penicillins    HISTORY:    Past Medical H Medication Sig Dispense Refill   • Lansoprazole 15 MG Oral Capsule Delayed Release Take 1 capsule (15 mg total) by mouth daily. 90 capsule 1   • NIFEdipine ER osmotic release 30 MG Oral Tablet 24 Hr Take 1 tablet (30 mg total) by mouth daily.  90 tablet 1 Packs/day: 1.00        Years: 35.00        Pack years: 35        Types: Cigarettes      Smokeless tobacco: Never Used    Vaping Use      Vaping Use: Every day    Substance and Sexual Activity      Alcohol use: Yes        Comment: 3-4 beers or glasses of w   Physically Abused:       Sexually Abused:   Family History   Problem Relation Age of Onset   • Heart Disease Father          MI age 68   • Other (ALS) Father    • Heart Disease Paternal Uncle          MI age 62s   • Diabetes Maternal Grandfather remarkable for lesions as noted on map. See details of examination  See Assessment /Plan for additional history and physical exam also:    Assessment / plan:    No orders of the defined types were placed in this encounter.       Meds & Refills for this [facility] biopsy if not resolved. Will use to AK's, probable BCC left postauricular crease    Moderate sun damage no other suspicious lesions  Other benign nevi lentigines  Please refer to map for specific lesions. See additional diagnoses.   Pros cons of various t

## 2021-10-01 RX ORDER — ATORVASTATIN CALCIUM 20 MG/1
TABLET, FILM COATED ORAL
Qty: 90 TABLET | Refills: 1 | Status: SHIPPED | OUTPATIENT
Start: 2021-10-01 | End: 2022-03-31

## 2021-11-09 ENCOUNTER — OFFICE VISIT (OUTPATIENT)
Dept: INTERNAL MEDICINE CLINIC | Facility: CLINIC | Age: 66
End: 2021-11-09
Payer: MEDICARE

## 2021-11-09 VITALS
WEIGHT: 274.5 LBS | BODY MASS INDEX: 39.3 KG/M2 | HEART RATE: 93 BPM | SYSTOLIC BLOOD PRESSURE: 126 MMHG | HEIGHT: 70 IN | DIASTOLIC BLOOD PRESSURE: 76 MMHG

## 2021-11-09 DIAGNOSIS — I10 ESSENTIAL HYPERTENSION: Primary | ICD-10-CM

## 2021-11-09 DIAGNOSIS — E78.00 HYPERCHOLESTEROLEMIA: ICD-10-CM

## 2021-11-09 PROCEDURE — 90732 PPSV23 VACC 2 YRS+ SUBQ/IM: CPT | Performed by: INTERNAL MEDICINE

## 2021-11-09 PROCEDURE — G0009 ADMIN PNEUMOCOCCAL VACCINE: HCPCS | Performed by: INTERNAL MEDICINE

## 2021-11-09 PROCEDURE — 99214 OFFICE O/P EST MOD 30 MIN: CPT | Performed by: INTERNAL MEDICINE

## 2021-11-09 PROCEDURE — 3074F SYST BP LT 130 MM HG: CPT | Performed by: INTERNAL MEDICINE

## 2021-11-09 PROCEDURE — 3078F DIAST BP <80 MM HG: CPT | Performed by: INTERNAL MEDICINE

## 2021-11-09 PROCEDURE — 3008F BODY MASS INDEX DOCD: CPT | Performed by: INTERNAL MEDICINE

## 2021-11-09 NOTE — PROGRESS NOTES
Brenda Michael is a 73year old male. Patient presents with:  Hypertension  Hypercholesterolemia    HPI:   Linwood Crisostomo presents this morning for 6-month follow-up of hypertension and hypercholesterolemia. At his physical in April, statin therapy was begun.     H Hypercholesterolemia    • Hypertension    • Recurrent skin cancer      Past Surgical History:   Procedure Laterality Date   • ELBOW SURGERY Right 1971   • OTHER  September 2008    Excision BCCa left temporal scalp   • OTHER  November 2008    Mohs surgery B

## 2021-11-09 NOTE — PATIENT INSTRUCTIONS
Your blood pressure today was excellent at 126/76. You received a Pneumovax today. Continue current medication. Medicare physical with labs in 6 months.

## 2021-12-23 RX ORDER — LISINOPRIL AND HYDROCHLOROTHIAZIDE 25; 20 MG/1; MG/1
TABLET ORAL
Qty: 90 TABLET | Refills: 1 | Status: SHIPPED | OUTPATIENT
Start: 2021-12-23

## 2022-01-04 RX ORDER — NIFEDIPINE 30 MG/1
TABLET, EXTENDED RELEASE ORAL
Qty: 90 TABLET | Refills: 1 | Status: SHIPPED | OUTPATIENT
Start: 2022-01-04 | End: 2022-04-05

## 2022-01-04 RX ORDER — MECOBALAMIN 5000 MCG
TABLET,DISINTEGRATING ORAL
Qty: 90 CAPSULE | Refills: 1 | Status: SHIPPED | OUTPATIENT
Start: 2022-01-04 | End: 2022-04-05

## 2022-01-05 NOTE — TELEPHONE ENCOUNTER
Refill passed per [facility] protocol.     Requested Prescriptions   Pending Prescriptions Disp Refills    LANSOPRAZOLE 15 MG Oral Capsule Delayed Release [Pharmacy Med Name: LANSOPRAZOLE DR 15 MG CAPSULE] 90 capsule 1     Sig: TAKE 1 CAPSULE BY MOUTH EVERY DAY        Gastrointestional Medication Protocol Passed - 1/4/2022  6:26 PM        Passed - Appointment in past 12 or next 3 months           NIFEDIPINE 30 MG Oral Tablet 24 Hr [Pharmacy Med Name: NIFEDIPINE ER 30 MG TABLET] 90 tablet 1     Sig: TAKE 1 TABLET BY MOUTH EVERY DAY        Hypertensive Medications Protocol Passed - 1/4/2022  6:26 PM        Passed - CMP or BMP in past 12 months        Passed - Appointment in past 6 or next 3 months        Passed - GFR Non- > 50     Lab Results   Component Value Date    GFRNAA 68 04/27/2021                       Recent Outpatient Visits              1 month ago Essential hypertension    [facility], [address], Vu Santillan MD    Office Visit    6 months ago AK (actinic keratosis)    [facility] Romi Omer MD    Office Visit    8 months ago Annual physical exam    Sofie Reddy MD    Office Visit    1 year ago Essential hypertension    [facility], [address], Vu Santillan MD    Office Visit    1 year ago Congenital meatal stenosis    [facility] for Rachel Diamond, Mukesh Xiao MD    Office Visit

## 2022-01-08 ENCOUNTER — HOSPITAL ENCOUNTER (EMERGENCY)
Facility: HOSPITAL | Age: 67
Discharge: HOME | End: 2022-01-08
Payer: MEDICARE

## 2022-01-08 ENCOUNTER — APPOINTMENT (OUTPATIENT)
Dept: GENERAL RADIOLOGY | Facility: HOSPITAL | Age: 67
End: 2022-01-08
Payer: MEDICARE

## 2022-01-08 VITALS
DIASTOLIC BLOOD PRESSURE: 84 MMHG | HEART RATE: 78 BPM | WEIGHT: 250 LBS | OXYGEN SATURATION: 100 % | SYSTOLIC BLOOD PRESSURE: 118 MMHG | TEMPERATURE: 98 F | BODY MASS INDEX: 35.79 KG/M2 | RESPIRATION RATE: 18 BRPM | HEIGHT: 70 IN

## 2022-01-08 DIAGNOSIS — S42.211A CLOSED DISPLACED FRACTURE OF SURGICAL NECK OF RIGHT HUMERUS, UNSPECIFIED FRACTURE MORPHOLOGY, INITIAL ENCOUNTER: Primary | ICD-10-CM

## 2022-01-08 PROCEDURE — 99283 EMERGENCY DEPT VISIT LOW MDM: CPT

## 2022-01-08 PROCEDURE — 73030 X-RAY EXAM OF SHOULDER: CPT

## 2022-01-08 RX ORDER — HYDROCODONE BITARTRATE AND ACETAMINOPHEN 5; 325 MG/1; MG/1
1-2 TABLET ORAL EVERY 6 HOURS PRN
Qty: 15 TABLET | Refills: 0 | Status: SHIPPED | OUTPATIENT
Start: 2022-01-08

## 2022-01-08 NOTE — ED PROVIDER NOTES
Patient Seen in: [facility] AND [facility] Emergency Department      History   Patient presents with:  Arm or Hand Injury    Stated Complaint: shoulder injury    Subjective:   HPI    The patient is a 29-year-old male who walked into his closet and fell into an o HPI.  Constitutional and vital signs reviewed. All other systems reviewed and negative except as noted above.     Physical Exam     ED Triage Vitals [01/08/22 1417]   /80   Pulse 89   Resp 20   Temp 97.6 °F (36.4 °C)   Temp src    SpO2 100 %   O2 Radiology exams  Viewed and reviewed by myself and findings discussed with patient including need for follow up                              Disposition and Plan     Clinical Impression:  Closed displaced fracture of surgical neck of right humer [No Acute Distress] : no acute distress [Well Nourished] : well nourished [Well Groomed] : well groomed [No Deformities] : no deformities [Well Developed] : well developed [Normal Oropharynx] : normal oropharynx [II] : Mallampati Class: II [Normal Appearance] : normal appearance [Supple] : supple [No Neck Mass] : no neck mass [No JVD] : no jvd [Normal Rate/Rhythm] : normal rate/rhythm [Normal PMI] : normal pmi [Murmur ___ / 6] : murmur [unfilled] / 6 [Normal S1, S2] : normal s1, s2 [No Resp Distress] : no resp distress [No Acc Muscle Use] : no acc muscle use [Normal Palpation] : normal palpation [Normal Rhythm and Effort] : normal rhythm and effort [Clear to Auscultation Bilaterally] : clear to auscultation bilaterally [No Abnormalities] : no abnormalities [Benign] : benign [Normal Gait] : normal gait [No Clubbing] : no clubbing [No Cyanosis] : no cyanosis [No Ischemic Changes] : no ischemic changes [No Edema] : no edema [FROM] : FROM [No Focal Deficits] : no focal deficits [Oriented x3] : oriented x3 [Normal Mood] : normal mood [Normal Insight/judgment] : normal insight/judgment [Normal Affect] : normal affect [Remote Memory Normal] : remote memory normal [TextBox_54] : systolic

## 2022-01-10 ENCOUNTER — PATIENT OUTREACH (OUTPATIENT)
Dept: CASE MANAGEMENT | Age: 67
End: 2022-01-10

## 2022-01-10 ENCOUNTER — TELEPHONE (OUTPATIENT)
Dept: INTERNAL MEDICINE CLINIC | Facility: CLINIC | Age: 67
End: 2022-01-10

## 2022-01-10 ENCOUNTER — TELEPHONE (OUTPATIENT)
Dept: ORTHOPEDICS CLINIC | Facility: CLINIC | Age: 67
End: 2022-01-10

## 2022-01-10 NOTE — TELEPHONE ENCOUNTER
Pt called stating pt went to [facility] emergency room on 1-8-22 for fracture of right humerus. Follow up as soon as possible. Pt's shoulder is unstable and painful. No appointment available this week.   Call pt

## 2022-01-10 NOTE — TELEPHONE ENCOUNTER
Pt stated he Fx humerus 1/7/22- will get an appt with Charity Jj- need xrays- advised to call Radiology dept , verbalized understanding

## 2022-01-10 NOTE — TELEPHONE ENCOUNTER
Called pt and he states he fell into a trap door on 1/8/22. He states he already called [facility] and has appt today with an Ortho.nothing further needed.

## 2022-03-08 ENCOUNTER — TELEPHONE (OUTPATIENT)
Dept: INTERNAL MEDICINE CLINIC | Facility: CLINIC | Age: 67
End: 2022-03-08

## 2022-03-24 RX ORDER — SILDENAFIL 100 MG/1
TABLET, FILM COATED ORAL
Qty: 6 TABLET | Refills: 7 | OUTPATIENT
Start: 2022-03-24

## 2022-03-31 RX ORDER — ATORVASTATIN CALCIUM 20 MG/1
20 TABLET, FILM COATED ORAL NIGHTLY
Qty: 90 TABLET | Refills: 1 | Status: SHIPPED | OUTPATIENT
Start: 2022-03-31 | End: 2022-09-22

## 2022-03-31 NOTE — TELEPHONE ENCOUNTER
Refill passed per [facility] protocol.       Requested Prescriptions   Pending Prescriptions Disp Refills    ATORVASTATIN 20 MG Oral Tab [Pharmacy Med Name: ATORVASTATIN 20 MG TABLET] 90 tablet 1     Sig: TAKE 1 TABLET BY MOUTH EVERY DAY AT NIGHT        Cholesterol Medication Protocol Passed - 3/31/2022  1:09 AM        Passed - ALT in past 12 months        Passed - LDL in past 12 months        Passed - Last ALT < 80       Lab Results   Component Value Date    ALT 35 04/27/2021             Passed - Last LDL < 130     Lab Results   Component Value Date     (H) 04/27/2021               Passed - Appointment in past 12 or next 3 months               Future Appointments         Provider Department Appt Notes    In 5 days Renea Cha MD [facility], [address], [employer] Physical            Recent Outpatient Visits              4 months ago Essential hypertension    [facility], [address], Dell Reyes MD    Office Visit    9 months ago AK (actinic keratosis)    [facility] Sarah Roland MD    Office Visit    11 months ago Annual physical exam    Edmundo Altamirano MD    Office Visit    1 year ago Essential hypertension    [facility], [address], Dell Reyes MD    Office Visit    1 year ago Congenital meatal stenosis    [facility] for Tim Young, Norberto Fregoso MD    Office Visit

## 2022-04-05 ENCOUNTER — OFFICE VISIT (OUTPATIENT)
Dept: INTERNAL MEDICINE CLINIC | Facility: CLINIC | Age: 67
End: 2022-04-05
Payer: MEDICARE

## 2022-04-05 VITALS
HEART RATE: 80 BPM | WEIGHT: 269.5 LBS | SYSTOLIC BLOOD PRESSURE: 126 MMHG | BODY MASS INDEX: 38.58 KG/M2 | HEIGHT: 70 IN | DIASTOLIC BLOOD PRESSURE: 78 MMHG

## 2022-04-05 DIAGNOSIS — E78.00 HYPERCHOLESTEROLEMIA: ICD-10-CM

## 2022-04-05 DIAGNOSIS — K21.9 GASTROESOPHAGEAL REFLUX DISEASE, UNSPECIFIED WHETHER ESOPHAGITIS PRESENT: ICD-10-CM

## 2022-04-05 DIAGNOSIS — Z12.5 PROSTATE CANCER SCREENING: ICD-10-CM

## 2022-04-05 DIAGNOSIS — I10 ESSENTIAL HYPERTENSION: ICD-10-CM

## 2022-04-05 DIAGNOSIS — Z00.00 ANNUAL PHYSICAL EXAM: Primary | ICD-10-CM

## 2022-04-05 DIAGNOSIS — Z00.00 ENCOUNTER FOR ANNUAL HEALTH EXAMINATION: ICD-10-CM

## 2022-04-05 DIAGNOSIS — C44.99 RECURRENT SKIN CANCER: ICD-10-CM

## 2022-04-05 PROCEDURE — 96160 PT-FOCUSED HLTH RISK ASSMT: CPT | Performed by: INTERNAL MEDICINE

## 2022-04-05 PROCEDURE — 3078F DIAST BP <80 MM HG: CPT | Performed by: INTERNAL MEDICINE

## 2022-04-05 PROCEDURE — 3074F SYST BP LT 130 MM HG: CPT | Performed by: INTERNAL MEDICINE

## 2022-04-05 PROCEDURE — 99397 PER PM REEVAL EST PAT 65+ YR: CPT | Performed by: INTERNAL MEDICINE

## 2022-04-05 PROCEDURE — G0402 INITIAL PREVENTIVE EXAM: HCPCS | Performed by: INTERNAL MEDICINE

## 2022-04-05 PROCEDURE — 3008F BODY MASS INDEX DOCD: CPT | Performed by: INTERNAL MEDICINE

## 2022-04-05 RX ORDER — SILDENAFIL 100 MG/1
100 TABLET, FILM COATED ORAL
Qty: 4 TABLET | Refills: 11 | Status: SHIPPED | OUTPATIENT
Start: 2022-04-05

## 2022-04-05 RX ORDER — LISINOPRIL AND HYDROCHLOROTHIAZIDE 25; 20 MG/1; MG/1
1 TABLET ORAL DAILY
Qty: 90 TABLET | Refills: 1 | Status: SHIPPED | OUTPATIENT
Start: 2022-04-05

## 2022-04-05 RX ORDER — NIFEDIPINE 30 MG/1
30 TABLET, EXTENDED RELEASE ORAL DAILY
Qty: 90 TABLET | Refills: 1 | Status: SHIPPED | OUTPATIENT
Start: 2022-04-05

## 2022-04-05 RX ORDER — LANSOPRAZOLE 15 MG/1
15 CAPSULE, DELAYED RELEASE ORAL DAILY
Qty: 90 CAPSULE | Refills: 1 | Status: SHIPPED | OUTPATIENT
Start: 2022-04-05

## 2022-04-07 ENCOUNTER — APPOINTMENT (OUTPATIENT)
Dept: LAB | Facility: HOSPITAL | Age: 67
End: 2022-04-07
Attending: INTERNAL MEDICINE
Payer: MEDICARE

## 2022-04-07 PROCEDURE — 82274 ASSAY TEST FOR BLOOD FECAL: CPT

## 2022-04-08 ENCOUNTER — LAB ENCOUNTER (OUTPATIENT)
Dept: LAB | Facility: HOSPITAL | Age: 67
End: 2022-04-08
Attending: INTERNAL MEDICINE
Payer: MEDICARE

## 2022-04-08 DIAGNOSIS — Z12.11 COLON CANCER SCREENING: ICD-10-CM

## 2022-04-08 DIAGNOSIS — Z12.5 PROSTATE CANCER SCREENING: ICD-10-CM

## 2022-04-08 DIAGNOSIS — K21.9 GASTROESOPHAGEAL REFLUX DISEASE, UNSPECIFIED WHETHER ESOPHAGITIS PRESENT: ICD-10-CM

## 2022-04-08 DIAGNOSIS — E78.00 HYPERCHOLESTEROLEMIA: ICD-10-CM

## 2022-04-08 DIAGNOSIS — Z00.00 ANNUAL PHYSICAL EXAM: ICD-10-CM

## 2022-04-08 LAB
ALBUMIN SERPL-MCNC: 3.6 G/DL (ref 3.4–5)
ALBUMIN/GLOB SERPL: 0.9 {RATIO} (ref 1–2)
ALP LIVER SERPL-CCNC: 113 U/L
ALT SERPL-CCNC: 57 U/L
ANION GAP SERPL CALC-SCNC: 5 MMOL/L (ref 0–18)
AST SERPL-CCNC: 28 U/L (ref 15–37)
BILIRUB SERPL-MCNC: 0.5 MG/DL (ref 0.1–2)
BUN BLD-MCNC: 17 MG/DL (ref 7–18)
BUN/CREAT SERPL: 17 (ref 10–20)
CALCIUM BLD-MCNC: 9.2 MG/DL (ref 8.5–10.1)
CHLORIDE SERPL-SCNC: 107 MMOL/L (ref 98–112)
CHOLEST SERPL-MCNC: 136 MG/DL (ref ?–200)
CO2 SERPL-SCNC: 28 MMOL/L (ref 21–32)
COMPLEXED PSA SERPL-MCNC: 2.24 NG/ML (ref ?–4)
CREAT BLD-MCNC: 1 MG/DL
DEPRECATED RDW RBC AUTO: 41.4 FL (ref 35.1–46.3)
ERYTHROCYTE [DISTWIDTH] IN BLOOD BY AUTOMATED COUNT: 12.8 % (ref 11–15)
FASTING PATIENT LIPID ANSWER: YES
FASTING STATUS PATIENT QL REPORTED: YES
GLOBULIN PLAS-MCNC: 4.1 G/DL (ref 2.8–4.4)
GLUCOSE BLD-MCNC: 102 MG/DL (ref 70–99)
HCT VFR BLD AUTO: 48 %
HDLC SERPL-MCNC: 53 MG/DL (ref 40–59)
HEMOCCULT STL QL: NEGATIVE
HGB BLD-MCNC: 15.6 G/DL
LDLC SERPL CALC-MCNC: 60 MG/DL (ref ?–100)
MCH RBC QN AUTO: 28.5 PG (ref 26–34)
MCHC RBC AUTO-ENTMCNC: 32.5 G/DL (ref 31–37)
MCV RBC AUTO: 87.6 FL
NONHDLC SERPL-MCNC: 83 MG/DL (ref ?–130)
OSMOLALITY SERPL CALC.SUM OF ELEC: 292 MOSM/KG (ref 275–295)
PLATELET # BLD AUTO: 290 10(3)UL (ref 150–450)
POTASSIUM SERPL-SCNC: 4.2 MMOL/L (ref 3.5–5.1)
PROT SERPL-MCNC: 7.7 G/DL (ref 6.4–8.2)
RBC # BLD AUTO: 5.48 X10(6)UL
SODIUM SERPL-SCNC: 140 MMOL/L (ref 136–145)
TRIGL SERPL-MCNC: 135 MG/DL (ref 30–149)
VLDLC SERPL CALC-MCNC: 20 MG/DL (ref 0–30)
WBC # BLD AUTO: 6.3 X10(3) UL (ref 4–11)

## 2022-04-08 PROCEDURE — 85027 COMPLETE CBC AUTOMATED: CPT

## 2022-04-08 PROCEDURE — 80061 LIPID PANEL: CPT

## 2022-04-08 PROCEDURE — 36415 COLL VENOUS BLD VENIPUNCTURE: CPT

## 2022-04-08 PROCEDURE — 80053 COMPREHEN METABOLIC PANEL: CPT

## 2022-06-24 ENCOUNTER — PATIENT MESSAGE (OUTPATIENT)
Dept: SURGERY | Facility: CLINIC | Age: 67
End: 2022-06-24

## 2022-06-30 ENCOUNTER — TELEPHONE (OUTPATIENT)
Dept: SURGERY | Facility: CLINIC | Age: 67
End: 2022-06-30

## 2022-06-30 NOTE — TELEPHONE ENCOUNTER
Per pt is running low on catheters, pt has scheduled follow up visit. Asking if catheters can be filled until he is seen, states has about 2 weeks worth of catheters.  Please advise

## 2022-07-12 NOTE — TELEPHONE ENCOUNTER
Order placed for 14 F straight catheters through [facility] medical online portal.

## 2022-08-08 ENCOUNTER — OFFICE VISIT (OUTPATIENT)
Dept: SURGERY | Facility: CLINIC | Age: 67
End: 2022-08-08
Payer: MEDICARE

## 2022-08-08 VITALS — BODY MASS INDEX: 39 KG/M2 | WEIGHT: 269 LBS

## 2022-08-08 DIAGNOSIS — Q64.33 CONGENITAL MEATAL STENOSIS: Primary | ICD-10-CM

## 2022-08-08 DIAGNOSIS — N52.9 ERECTILE DYSFUNCTION, UNSPECIFIED ERECTILE DYSFUNCTION TYPE: ICD-10-CM

## 2022-08-08 PROCEDURE — 99214 OFFICE O/P EST MOD 30 MIN: CPT | Performed by: UROLOGY

## 2022-08-09 ENCOUNTER — PATIENT MESSAGE (OUTPATIENT)
Dept: SURGERY | Facility: CLINIC | Age: 67
End: 2022-08-09

## 2022-08-09 NOTE — TELEPHONE ENCOUNTER
[employer] is requesting office note to include urine retention as a diagnose to cover the catheters

## 2022-09-04 ENCOUNTER — TELEPHONE (OUTPATIENT)
Dept: INTERNAL MEDICINE CLINIC | Facility: CLINIC | Age: 67
End: 2022-09-04

## 2022-09-04 NOTE — TELEPHONE ENCOUNTER
Spoke with patient this morning 9-4. Onset today of sore throat and productive cough. No fever. No shortness of breath. Home COVID test positive. He requests Paxlovid. Prescription for Paxlovid twice daily for 5 days called into [facility] pharmacy. Recommend ER evaluation if shortness of breath develops.

## 2022-09-08 NOTE — TELEPHONE ENCOUNTER
Emiliano Monsalve sent a form to requesting office notes to provide pt with catheter samples.  Please advise    Fax: [phone]

## 2022-09-12 NOTE — TELEPHONE ENCOUNTER
Received fax from [facility] requesting a valid intermittent catheter prescription or recent office visit note. Last office visit note faxed to [phone].

## 2022-09-22 RX ORDER — ATORVASTATIN CALCIUM 20 MG/1
TABLET, FILM COATED ORAL
Qty: 90 TABLET | Refills: 1 | Status: SHIPPED | OUTPATIENT
Start: 2022-09-22 | End: 2023-03-27

## 2022-10-14 ENCOUNTER — OFFICE VISIT (OUTPATIENT)
Dept: INTERNAL MEDICINE CLINIC | Facility: CLINIC | Age: 67
End: 2022-10-14
Payer: MEDICARE

## 2022-10-14 VITALS
DIASTOLIC BLOOD PRESSURE: 74 MMHG | BODY MASS INDEX: 39.7 KG/M2 | HEART RATE: 87 BPM | SYSTOLIC BLOOD PRESSURE: 122 MMHG | WEIGHT: 277.31 LBS | HEIGHT: 70 IN

## 2022-10-14 DIAGNOSIS — E78.00 HYPERCHOLESTEROLEMIA: ICD-10-CM

## 2022-10-14 DIAGNOSIS — R07.9 CHEST PAIN, UNSPECIFIED TYPE: ICD-10-CM

## 2022-10-14 DIAGNOSIS — I10 ESSENTIAL HYPERTENSION: Primary | ICD-10-CM

## 2022-10-14 PROCEDURE — 3078F DIAST BP <80 MM HG: CPT | Performed by: INTERNAL MEDICINE

## 2022-10-14 PROCEDURE — 99214 OFFICE O/P EST MOD 30 MIN: CPT | Performed by: INTERNAL MEDICINE

## 2022-10-14 PROCEDURE — 3074F SYST BP LT 130 MM HG: CPT | Performed by: INTERNAL MEDICINE

## 2022-10-14 PROCEDURE — 3008F BODY MASS INDEX DOCD: CPT | Performed by: INTERNAL MEDICINE

## 2022-10-14 PROCEDURE — 1126F AMNT PAIN NOTED NONE PRSNT: CPT | Performed by: INTERNAL MEDICINE

## 2022-10-14 NOTE — PATIENT INSTRUCTIONS
Your blood pressure today was good at 122/74. Please continue current medications. Please schedule a stress Echo. Please try to eat healthy and exercise regularly, and schedule a Medicare physical in 6 months.

## 2022-10-28 ENCOUNTER — PROCEDURE (OUTPATIENT)
Dept: SURGERY | Facility: CLINIC | Age: 67
End: 2022-10-28
Payer: MEDICARE

## 2022-10-28 VITALS — DIASTOLIC BLOOD PRESSURE: 89 MMHG | SYSTOLIC BLOOD PRESSURE: 130 MMHG | HEART RATE: 77 BPM

## 2022-10-28 DIAGNOSIS — F52.21 ED (ERECTILE DYSFUNCTION) OF NON-ORGANIC ORIGIN: ICD-10-CM

## 2022-10-28 DIAGNOSIS — Q64.33 CONGENITAL MEATAL STENOSIS: Primary | ICD-10-CM

## 2022-10-28 PROCEDURE — 52000 CYSTOURETHROSCOPY: CPT | Performed by: UROLOGY

## 2022-10-28 PROCEDURE — 3075F SYST BP GE 130 - 139MM HG: CPT | Performed by: UROLOGY

## 2022-10-28 PROCEDURE — 3079F DIAST BP 80-89 MM HG: CPT | Performed by: UROLOGY

## 2022-10-28 PROCEDURE — 99213 OFFICE O/P EST LOW 20 MIN: CPT | Performed by: UROLOGY

## 2022-10-28 RX ORDER — CIPROFLOXACIN 500 MG/1
500 TABLET, FILM COATED ORAL ONCE
Status: COMPLETED | OUTPATIENT
Start: 2022-10-28 | End: 2022-10-28

## 2022-10-28 RX ADMIN — CIPROFLOXACIN 500 MG: 500 TABLET, FILM COATED ORAL at 11:17:00

## 2022-10-28 NOTE — PROGRESS NOTES
Order catheters from [facility] [place] for 14 F straight tipped catheter through online portal.

## 2022-11-04 ENCOUNTER — LAB ENCOUNTER (OUTPATIENT)
Dept: LAB | Facility: HOSPITAL | Age: 67
End: 2022-11-04
Attending: INTERNAL MEDICINE
Payer: MEDICARE

## 2022-11-04 DIAGNOSIS — R07.9 CHEST PAIN, UNSPECIFIED TYPE: ICD-10-CM

## 2022-11-05 LAB — SARS-COV-2 RNA RESP QL NAA+PROBE: NOT DETECTED

## 2022-11-07 ENCOUNTER — HOSPITAL ENCOUNTER (OUTPATIENT)
Dept: CV DIAGNOSTICS | Facility: HOSPITAL | Age: 67
Discharge: HOME | End: 2022-11-07
Attending: INTERNAL MEDICINE
Payer: MEDICARE

## 2022-11-07 DIAGNOSIS — R07.9 CHEST PAIN, UNSPECIFIED TYPE: ICD-10-CM

## 2022-11-07 PROCEDURE — 93350 STRESS TTE ONLY: CPT | Performed by: INTERNAL MEDICINE

## 2022-11-07 PROCEDURE — 93016 CV STRESS TEST SUPVJ ONLY: CPT | Performed by: INTERNAL MEDICINE

## 2022-11-07 PROCEDURE — 93017 CV STRESS TEST TRACING ONLY: CPT | Performed by: INTERNAL MEDICINE

## 2022-11-07 PROCEDURE — 93018 CV STRESS TEST I&R ONLY: CPT | Performed by: INTERNAL MEDICINE

## 2022-11-23 ENCOUNTER — OFFICE VISIT (OUTPATIENT)
Dept: DERMATOLOGY CLINIC | Facility: CLINIC | Age: 67
End: 2022-11-23
Payer: MEDICARE

## 2022-11-23 DIAGNOSIS — L81.4 LENTIGO: ICD-10-CM

## 2022-11-23 DIAGNOSIS — L82.1 SEBORRHEIC KERATOSES: ICD-10-CM

## 2022-11-23 DIAGNOSIS — Z85.828 HISTORY OF SKIN CANCER: ICD-10-CM

## 2022-11-23 DIAGNOSIS — L57.0 AK (ACTINIC KERATOSIS): ICD-10-CM

## 2022-11-23 DIAGNOSIS — D23.9 BENIGN NEOPLASM OF SKIN, UNSPECIFIED LOCATION: ICD-10-CM

## 2022-11-23 DIAGNOSIS — D48.5 NEOPLASM OF UNCERTAIN BEHAVIOR OF SKIN: Primary | ICD-10-CM

## 2022-11-23 PROCEDURE — 17000 DESTRUCT PREMALG LESION: CPT | Performed by: DERMATOLOGY

## 2022-11-23 PROCEDURE — 88305 TISSUE EXAM BY PATHOLOGIST: CPT | Performed by: DERMATOLOGY

## 2022-11-23 PROCEDURE — 99213 OFFICE O/P EST LOW 20 MIN: CPT | Performed by: DERMATOLOGY

## 2022-11-23 PROCEDURE — 11103 TANGNTL BX SKIN EA SEP/ADDL: CPT | Performed by: DERMATOLOGY

## 2022-11-23 PROCEDURE — 11102 TANGNTL BX SKIN SINGLE LES: CPT | Performed by: DERMATOLOGY

## 2022-11-23 NOTE — PROGRESS NOTES
Operative Report                     Shave/  Tangential biopsy     Clinical diagnosis:    Size of lesion:    Location:Patient with history of skin cancer nonmelanoma crusting lesions not healing  Spec 1 Description >>>>>: Left lateral zygoma  Spec 1 Comment: Rule out BCC pearly papule 1 cm irregular crusted papule  Spec 2 Description >>>>>: Left preauricular cheek  Spec 2 Comment: Rule out BCC pearly ill-defined papule 1.2 cm    Procedure: With patient in appropriate position the skin of the above was scrubbed with alcohol. Anesthesia was obtained with 1% Xylocaine with epinephrine. The skin surrounding the lesion was placed under tension and the lesion was incised using a #15 scalpel blade. The specimen was sent for histopathologic exam.    Hemostasis was obtained with electrocautery/aluminum chloride. Estimated blood loss less than 2 cc. Biopsy dressed with Polysporin, bandage. Pressure dressing:   No    Complications: None    Written instructions given and reviewed with patient    Await pathology    Contact information reviewed.     Procedural physician:  Herve Gomez MD

## 2022-11-29 ENCOUNTER — TELEPHONE (OUTPATIENT)
Dept: DERMATOLOGY CLINIC | Facility: CLINIC | Age: 67
End: 2022-11-29

## 2022-11-29 NOTE — TELEPHONE ENCOUNTER
Pt advised that the office will call as soon as Quinn Shah reviews the results. Pt verbalized understanding.

## 2022-12-01 ENCOUNTER — TELEPHONE (OUTPATIENT)
Dept: INTERNAL MEDICINE CLINIC | Facility: CLINIC | Age: 67
End: 2022-12-01

## 2022-12-01 ENCOUNTER — PATIENT MESSAGE (OUTPATIENT)
Dept: DERMATOLOGY CLINIC | Facility: CLINIC | Age: 67
End: 2022-12-01

## 2022-12-01 ENCOUNTER — PATIENT MESSAGE (OUTPATIENT)
Dept: INTERNAL MEDICINE CLINIC | Facility: CLINIC | Age: 67
End: 2022-12-01

## 2022-12-01 NOTE — TELEPHONE ENCOUNTER
Last office visit was 10/14. \". Essential hypertension  Well-controlled. Continue current medication. Discussed and reinforced healthy diet and regular exercise with some attempts at weight loss. Medicare physical with labs in 6 months   please advise on pt's inquiry about Ozempic.

## 2022-12-01 NOTE — TELEPHONE ENCOUNTER
Duplicate message. See 2 other [employer] messages from today. Trilobed Flap Text: The defect edges were debeveled with a #15 scalpel blade.  Given the location of the defect and the proximity to free margins a trilobed flap was deemed most appropriate.  Using a sterile surgical marker, an appropriate trilobed flap drawn around the defect.    The area thus outlined was incised deep to adipose tissue with a #15 scalpel blade.  The skin margins were undermined to an appropriate distance in all directions utilizing iris scissors.

## 2022-12-01 NOTE — TELEPHONE ENCOUNTER
From: Gilson Males  To: Sarah Rojo MD  Sent: 12/1/2022 8:30 AM CST  Subject: Ozempic or UGUQFX prescription     Sonam Navarrete,   I am writing to inquire as to whether I can qualify for a prescription for Ozempic or Wegovy? I have continued to exercise and I have been unable to lose weight.    Thank you  Wally Lucero   [phone]

## 2022-12-07 ENCOUNTER — PATIENT MESSAGE (OUTPATIENT)
Dept: DERMATOLOGY CLINIC | Facility: CLINIC | Age: 67
End: 2022-12-07

## 2022-12-07 NOTE — TELEPHONE ENCOUNTER
This will need Mohs, for both. I was trying to figure out who is in network. Can we please check with Dr. Walt Olguin and Dr. Hal Cameron offices about this.  He will need a referral

## 2022-12-08 ENCOUNTER — TELEPHONE (OUTPATIENT)
Dept: SURGERY | Facility: CLINIC | Age: 67
End: 2022-12-08

## 2022-12-08 NOTE — TELEPHONE ENCOUNTER
Order for catheters was placed on [facility] desk, I received the signed order from [address] [place] [address] on refill for 16FR straight tip catheters and lubricating jelly packets, order was faxed to secure fax and then sent to scanning.

## 2022-12-14 ENCOUNTER — TELEPHONE (OUTPATIENT)
Dept: SURGERY | Facility: CLINIC | Age: 67
End: 2022-12-14

## 2022-12-14 NOTE — TELEPHONE ENCOUNTER
S/W rep Rona Galdamez from [facility] asking if he can send sample bags for CIC caths to use so that we may use them in the future for our pt's needs. I explained that we are currently using [facility] and are satisfied with their service. He stated that his company can supply all types of caths too and they also accept cash payments for hardship cases that do not have insurance and he knows for sure that [employer] does not. I told him that I will keep this in mind and use his company if the situation warrants it.

## 2022-12-19 ENCOUNTER — TELEPHONE (OUTPATIENT)
Dept: SURGERY | Facility: CLINIC | Age: 67
End: 2022-12-19

## 2022-12-21 ENCOUNTER — OFFICE VISIT (OUTPATIENT)
Dept: INTERNAL MEDICINE CLINIC | Facility: CLINIC | Age: 67
End: 2022-12-21
Payer: MEDICARE

## 2022-12-21 VITALS
SYSTOLIC BLOOD PRESSURE: 122 MMHG | DIASTOLIC BLOOD PRESSURE: 72 MMHG | WEIGHT: 262 LBS | HEART RATE: 88 BPM | HEIGHT: 70 IN | BODY MASS INDEX: 37.51 KG/M2

## 2022-12-21 DIAGNOSIS — E66.01 CLASS 2 SEVERE OBESITY DUE TO EXCESS CALORIES WITH SERIOUS COMORBIDITY AND BODY MASS INDEX (BMI) OF 37.0 TO 37.9 IN ADULT (HCC): Primary | ICD-10-CM

## 2022-12-21 DIAGNOSIS — I10 ESSENTIAL HYPERTENSION: ICD-10-CM

## 2022-12-21 PROCEDURE — 99214 OFFICE O/P EST MOD 30 MIN: CPT | Performed by: INTERNAL MEDICINE

## 2022-12-21 PROCEDURE — 3008F BODY MASS INDEX DOCD: CPT | Performed by: INTERNAL MEDICINE

## 2022-12-21 PROCEDURE — 1126F AMNT PAIN NOTED NONE PRSNT: CPT | Performed by: INTERNAL MEDICINE

## 2022-12-21 PROCEDURE — 3078F DIAST BP <80 MM HG: CPT | Performed by: INTERNAL MEDICINE

## 2022-12-21 PROCEDURE — 3074F SYST BP LT 130 MM HG: CPT | Performed by: INTERNAL MEDICINE

## 2022-12-21 NOTE — PATIENT INSTRUCTIONS
Your blood pressure today was good at 122/72. Please increase Ozempic to 0.5 mg weekly for 1 month. We would then likely increase your dose to 1 mg weekly and then 2 mg weekly. Continue other medications. Continue healthy diet and regular exercise. Please schedule a physical in April.

## 2022-12-22 DIAGNOSIS — Z00.00 ANNUAL PHYSICAL EXAM: ICD-10-CM

## 2022-12-22 RX ORDER — LISINOPRIL AND HYDROCHLOROTHIAZIDE 25; 20 MG/1; MG/1
TABLET ORAL
Qty: 90 TABLET | Refills: 1 | Status: SHIPPED | OUTPATIENT
Start: 2022-12-22

## 2022-12-22 RX ORDER — NIFEDIPINE 30 MG/1
TABLET, EXTENDED RELEASE ORAL
Qty: 90 TABLET | Refills: 1 | Status: SHIPPED | OUTPATIENT
Start: 2022-12-22

## 2022-12-22 RX ORDER — LANSOPRAZOLE 15 MG/1
CAPSULE, DELAYED RELEASE ORAL
Qty: 90 CAPSULE | Refills: 1 | Status: SHIPPED | OUTPATIENT
Start: 2022-12-22

## 2023-01-11 ENCOUNTER — MED REC SCAN ONLY (OUTPATIENT)
Dept: DERMATOLOGY CLINIC | Facility: CLINIC | Age: 68
End: 2023-01-11

## 2023-01-11 ENCOUNTER — TELEPHONE (OUTPATIENT)
Dept: DERMATOLOGY CLINIC | Facility: CLINIC | Age: 68
End: 2023-01-11

## 2023-01-20 ENCOUNTER — TELEPHONE (OUTPATIENT)
Dept: SURGERY | Facility: CLINIC | Age: 68
End: 2023-01-20

## 2023-01-20 RX ORDER — SEMAGLUTIDE 1.34 MG/ML
INJECTION, SOLUTION SUBCUTANEOUS
Refills: 0 | OUTPATIENT
Start: 2023-01-20

## 2023-01-20 RX ORDER — SEMAGLUTIDE 1.34 MG/ML
1 INJECTION, SOLUTION SUBCUTANEOUS WEEKLY
Qty: 1 EACH | Refills: 0 | Status: SHIPPED | OUTPATIENT
Start: 2023-01-20 | End: 2023-01-20

## 2023-01-20 NOTE — TELEPHONE ENCOUNTER
Fax received from [address] [place] [address], needed a signature on pt LOV dated 11/8/2022 order was signed by [facility] Jed VARGAS, [place] 12/7/22 for cath supplies, but they are asking [facility] to sign his LOV note stating \"pt has retention of urine and is using CIC 1 time per day for a period greater than 90 days\" placed on [facility] desk then will fax to secure fax number.

## 2023-01-20 NOTE — TELEPHONE ENCOUNTER
Patient calling to follow up, pt called the pharm and they said they got a denial from Dr    Would like it the new script to be sent to [facility].     Would like a call back

## 2023-02-01 ENCOUNTER — OFFICE VISIT (OUTPATIENT)
Dept: DERMATOLOGY CLINIC | Facility: CLINIC | Age: 68
End: 2023-02-01

## 2023-02-01 DIAGNOSIS — L81.4 LENTIGO: ICD-10-CM

## 2023-02-01 DIAGNOSIS — L82.1 SEBORRHEIC KERATOSES: ICD-10-CM

## 2023-02-01 DIAGNOSIS — D48.5 NEOPLASM OF UNCERTAIN BEHAVIOR OF SKIN: Primary | ICD-10-CM

## 2023-02-01 DIAGNOSIS — L57.0 AK (ACTINIC KERATOSIS): ICD-10-CM

## 2023-02-01 DIAGNOSIS — D23.9 BENIGN NEOPLASM OF SKIN, UNSPECIFIED LOCATION: ICD-10-CM

## 2023-02-01 DIAGNOSIS — Z85.828 HISTORY OF SKIN CANCER: ICD-10-CM

## 2023-02-01 PROCEDURE — 88305 TISSUE EXAM BY PATHOLOGIST: CPT | Performed by: DERMATOLOGY

## 2023-02-05 NOTE — PROGRESS NOTES
Operative Report                     Shave/  Tangential biopsy     Clinical diagnosis:    Size of lesion:    Location:pt with recent Mohs x 2 pearly papule noted on exam  Spec 1 Description >>>>>: left lateral zygoma  Spec 1 Comment: r/o BCC linear plaque 1x 1.2 cm    Procedure: With patient in appropriate position the skin of the above was scrubbed with alcohol. Anesthesia was obtained with 1% Xylocaine with epinephrine. The skin surrounding the lesion was placed under tension and the lesion was incised using a #15 scalpel blade. The specimen was sent for histopathologic exam.    Hemostasis was obtained with electrocautery/aluminum chloride. Estimated blood loss less than 2 cc. Biopsy dressed with Polysporin, bandage. Pressure dressing:   No    Complications: None    Written instructions given and reviewed with patient    Await pathology    Contact information reviewed.     Procedural physician:  Colten Llanos MD

## 2023-02-16 RX ORDER — SEMAGLUTIDE 2.68 MG/ML
2 INJECTION, SOLUTION SUBCUTANEOUS WEEKLY
Qty: 1 EACH | Refills: 3 | Status: SHIPPED | OUTPATIENT
Start: 2023-02-16

## 2023-02-16 RX ORDER — SEMAGLUTIDE 1.34 MG/ML
INJECTION, SOLUTION SUBCUTANEOUS
Refills: 0 | OUTPATIENT
Start: 2023-02-16

## 2023-02-23 ENCOUNTER — TELEPHONE (OUTPATIENT)
Dept: INTERNAL MEDICINE CLINIC | Facility: CLINIC | Age: 68
End: 2023-02-23

## 2023-02-23 DIAGNOSIS — Z12.11 SCREENING FOR COLON CANCER: Primary | ICD-10-CM

## 2023-03-27 RX ORDER — ATORVASTATIN CALCIUM 20 MG/1
20 TABLET, FILM COATED ORAL NIGHTLY
Qty: 90 TABLET | Refills: 3 | Status: SHIPPED | OUTPATIENT
Start: 2023-03-27

## 2023-03-27 NOTE — TELEPHONE ENCOUNTER
Refill passed per [address] protocol.      Requested Prescriptions   Pending Prescriptions Disp Refills    ATORVASTATIN 20 MG Oral Tab [Pharmacy Med Name: ATORVASTATIN 20 MG TABLET] 90 tablet 1     Sig: TAKE 1 TABLET BY MOUTH EVERY DAY AT NIGHT       Cholesterol Medication Protocol Passed - 3/26/2023  1:04 AM        Passed - ALT in past 12 months        Passed - LDL in past 12 months        Passed - Last ALT < 80     Lab Results   Component Value Date    ALT 57 04/08/2022             Passed - Last LDL < 130     Lab Results   Component Value Date    LDL 60 04/08/2022             Passed - In person appointment or virtual visit in the past 12 mos or appointment in next 3 mos     Recent Outpatient Visits              1 month ago Neoplasm of uncertain behavior of skin    Meg Hammond MD    Office Visit    3 months ago Class 2 severe obesity due to excess calories with serious comorbidity and body mass index (BMI) of 37.0 to 37.9 in adult [facility])    [address], [address] Linda Moreno MD    Office Visit    4 months ago Neoplasm of uncertain behavior of skin    [facility]    Office Visit    4 months ago ED (erectile dysfunction) of non-organic origin    Iraj Angel MD    Office Visit    5 months ago Essential hypertension    [facility], [address] Linda Moreno MD    Office Visit          Future Appointments         Provider Department Appt Notes    In 1 week Hyun Riley MD [address], [address] Medicare annual wellness visit, last px: 04/05/22    In 2 months Denny Wilde MD [address], [facility] full skin check                     Recent Outpatient Visits              1 month ago [facility]oplasm of uncertain behavior of skin    [facility] Grazyna Baptiste [place], Gearlean Flaming, MD    Office Visit    3 months ago Class 2 severe obesity due to excess calories with serious comorbidity and body mass index (BMI) of 37.0 to 37.9 in adult [facility])    Grazyna Garner Harlo Monarch, MD    Office Visit    4 months ago Neoplasm of uncertain behavior of skin    Dave Mendozamhmaggi Beckford MD    Office Visit    4 months ago ED (erectile dysfunction) of non-organic origin    Femi Ramos, [address], Alma Rosa Drake MD    Office Visit    5 months ago Essential hypertension    [facility] , Mega Treadwell MD    Office Visit             Future Appointments         Provider Department Appt Notes    In 1 week MD Femi Castro Lory Frames, [employer] Medicare annual wellness visit, last px: 04/05/22    In 2 months MD Femi Clark [place] full skin check

## 2023-04-03 ENCOUNTER — LAB ENCOUNTER (OUTPATIENT)
Dept: LAB | Age: 68
End: 2023-04-03
Attending: INTERNAL MEDICINE
Payer: MEDICARE

## 2023-04-03 ENCOUNTER — OFFICE VISIT (OUTPATIENT)
Dept: INTERNAL MEDICINE CLINIC | Facility: CLINIC | Age: 68
End: 2023-04-03

## 2023-04-03 VITALS
HEART RATE: 98 BPM | HEIGHT: 70 IN | SYSTOLIC BLOOD PRESSURE: 122 MMHG | WEIGHT: 228.19 LBS | DIASTOLIC BLOOD PRESSURE: 76 MMHG | BODY MASS INDEX: 32.67 KG/M2

## 2023-04-03 DIAGNOSIS — I10 ESSENTIAL HYPERTENSION: ICD-10-CM

## 2023-04-03 DIAGNOSIS — K21.9 GASTROESOPHAGEAL REFLUX DISEASE, UNSPECIFIED WHETHER ESOPHAGITIS PRESENT: ICD-10-CM

## 2023-04-03 DIAGNOSIS — Z00.00 ENCOUNTER FOR ANNUAL HEALTH EXAMINATION: ICD-10-CM

## 2023-04-03 DIAGNOSIS — E78.00 HYPERCHOLESTEROLEMIA: ICD-10-CM

## 2023-04-03 DIAGNOSIS — Z00.00 ANNUAL PHYSICAL EXAM: ICD-10-CM

## 2023-04-03 DIAGNOSIS — C44.99 RECURRENT SKIN CANCER: ICD-10-CM

## 2023-04-03 DIAGNOSIS — N40.0 BENIGN PROSTATIC HYPERPLASIA, UNSPECIFIED WHETHER LOWER URINARY TRACT SYMPTOMS PRESENT: ICD-10-CM

## 2023-04-03 DIAGNOSIS — E66.09 CLASS 1 OBESITY DUE TO EXCESS CALORIES WITH SERIOUS COMORBIDITY AND BODY MASS INDEX (BMI) OF 32.0 TO 32.9 IN ADULT: ICD-10-CM

## 2023-04-03 DIAGNOSIS — Z00.00 ANNUAL PHYSICAL EXAM: Primary | ICD-10-CM

## 2023-04-03 DIAGNOSIS — L60.0 INGROWN TOENAIL: ICD-10-CM

## 2023-04-03 LAB
ALBUMIN SERPL-MCNC: 3.6 G/DL (ref 3.4–5)
ALBUMIN/GLOB SERPL: 1.1 {RATIO} (ref 1–2)
ALP LIVER SERPL-CCNC: 92 U/L
ALT SERPL-CCNC: 26 U/L
ANION GAP SERPL CALC-SCNC: 7 MMOL/L (ref 0–18)
AST SERPL-CCNC: 16 U/L (ref 15–37)
BILIRUB SERPL-MCNC: 0.8 MG/DL (ref 0.1–2)
BUN BLD-MCNC: 15 MG/DL (ref 7–18)
BUN/CREAT SERPL: 15.2 (ref 10–20)
CALCIUM BLD-MCNC: 9.3 MG/DL (ref 8.5–10.1)
CHLORIDE SERPL-SCNC: 105 MMOL/L (ref 98–112)
CHOLEST SERPL-MCNC: 111 MG/DL (ref ?–200)
CO2 SERPL-SCNC: 27 MMOL/L (ref 21–32)
COMPLEXED PSA SERPL-MCNC: 1.37 NG/ML (ref ?–4)
CREAT BLD-MCNC: 0.99 MG/DL
DEPRECATED RDW RBC AUTO: 43.2 FL (ref 35.1–46.3)
ERYTHROCYTE [DISTWIDTH] IN BLOOD BY AUTOMATED COUNT: 13.4 % (ref 11–15)
FASTING PATIENT LIPID ANSWER: YES
FASTING STATUS PATIENT QL REPORTED: YES
GFR SERPLBLD BASED ON 1.73 SQ M-ARVRAT: 84 ML/MIN/1.73M2 (ref 60–?)
GLOBULIN PLAS-MCNC: 3.4 G/DL (ref 2.8–4.4)
GLUCOSE BLD-MCNC: 93 MG/DL (ref 70–99)
HCT VFR BLD AUTO: 43.7 %
HDLC SERPL-MCNC: 67 MG/DL (ref 40–59)
HGB BLD-MCNC: 14.9 G/DL
LDLC SERPL CALC-MCNC: 32 MG/DL (ref ?–100)
MCH RBC QN AUTO: 29.9 PG (ref 26–34)
MCHC RBC AUTO-ENTMCNC: 34.1 G/DL (ref 31–37)
MCV RBC AUTO: 87.6 FL
NONHDLC SERPL-MCNC: 44 MG/DL (ref ?–130)
OSMOLALITY SERPL CALC.SUM OF ELEC: 289 MOSM/KG (ref 275–295)
PLATELET # BLD AUTO: 283 10(3)UL (ref 150–450)
POTASSIUM SERPL-SCNC: 3.7 MMOL/L (ref 3.5–5.1)
PROT SERPL-MCNC: 7 G/DL (ref 6.4–8.2)
RBC # BLD AUTO: 4.99 X10(6)UL
SODIUM SERPL-SCNC: 139 MMOL/L (ref 136–145)
TRIGL SERPL-MCNC: 53 MG/DL (ref 30–149)
VLDLC SERPL CALC-MCNC: 7 MG/DL (ref 0–30)
WBC # BLD AUTO: 5.4 X10(3) UL (ref 4–11)

## 2023-04-03 PROCEDURE — 80061 LIPID PANEL: CPT

## 2023-04-03 PROCEDURE — 36415 COLL VENOUS BLD VENIPUNCTURE: CPT

## 2023-04-03 PROCEDURE — 80053 COMPREHEN METABOLIC PANEL: CPT

## 2023-04-03 PROCEDURE — 85027 COMPLETE CBC AUTOMATED: CPT

## 2023-04-05 ENCOUNTER — LABORATORY ENCOUNTER (OUTPATIENT)
Dept: LAB | Facility: HOSPITAL | Age: 68
End: 2023-04-05
Attending: INTERNAL MEDICINE
Payer: MEDICARE

## 2023-04-05 DIAGNOSIS — Z12.11 SCREENING FOR COLON CANCER: ICD-10-CM

## 2023-04-05 LAB — HEMOCCULT STL QL: NEGATIVE

## 2023-04-05 PROCEDURE — 82274 ASSAY TEST FOR BLOOD FECAL: CPT

## 2023-05-01 ENCOUNTER — OFFICE VISIT (OUTPATIENT)
Dept: PODIATRY CLINIC | Facility: CLINIC | Age: 68
End: 2023-05-01

## 2023-05-01 DIAGNOSIS — L60.0 INGROWN TOENAIL OF BOTH FEET: Primary | ICD-10-CM

## 2023-05-01 DIAGNOSIS — M79.675 CHRONIC PAIN OF TOES OF BOTH FEET: ICD-10-CM

## 2023-05-01 DIAGNOSIS — G89.29 CHRONIC PAIN OF TOES OF BOTH FEET: ICD-10-CM

## 2023-05-01 DIAGNOSIS — M79.674 CHRONIC PAIN OF TOES OF BOTH FEET: ICD-10-CM

## 2023-05-01 PROCEDURE — 1126F AMNT PAIN NOTED NONE PRSNT: CPT | Performed by: PODIATRIST

## 2023-05-01 PROCEDURE — 99203 OFFICE O/P NEW LOW 30 MIN: CPT | Performed by: PODIATRIST

## 2023-05-02 DIAGNOSIS — Z00.00 ANNUAL PHYSICAL EXAM: ICD-10-CM

## 2023-05-02 RX ORDER — LISINOPRIL AND HYDROCHLOROTHIAZIDE 25; 20 MG/1; MG/1
TABLET ORAL
Qty: 90 TABLET | Refills: 1 | Status: SHIPPED | OUTPATIENT
Start: 2023-05-02

## 2023-05-02 RX ORDER — LANSOPRAZOLE 15 MG/1
CAPSULE, DELAYED RELEASE ORAL
Qty: 90 CAPSULE | Refills: 1 | Status: SHIPPED | OUTPATIENT
Start: 2023-05-02

## 2023-06-06 RX ORDER — SEMAGLUTIDE 2.68 MG/ML
2 INJECTION, SOLUTION SUBCUTANEOUS WEEKLY
Qty: 3 ML | Refills: 5 | Status: SHIPPED | OUTPATIENT
Start: 2023-06-06

## 2023-06-06 NOTE — TELEPHONE ENCOUNTER
Please review. Protocol failed / Has no protocol. Requested Prescriptions   Pending Prescriptions Disp Refills    OZEMPIC, 2 MG/DOSE, 8 MG/3ML Subcutaneous Solution Pen-injector [Pharmacy Med Name: OZEMPIC 8 MG/3 ML (2 MG/DOSE)]  3     Sig: Inject 2 mg into the skin once a week.        Diabetes Medication Protocol Failed - 6/6/2023 12:01 AM        Failed - Last A1C < 7.5 and within past 6 months     No results found for: A1C            Passed - In person appointment or virtual visit in the past 6 mos or appointment in next 3 mos     Recent Outpatient Visits              1 month ago Ingrown toenail of both feet    [facility], [facility] [place], [place]    Office Visit    2 months ago Annual physical exam    [facility], [facility] Thao Moreno MD    Office Visit    4 months ago Neoplasm of uncertain behavior of skin    [facility], [address] [place] [address], Rosemarie Wallace MD    Office Visit    5 months ago Class 2 severe obesity due to excess calories with serious comorbidity and body mass index (BMI) of 37.0 to 37.9 in adult [facility])    [facility], [facility] Thao Moreno MD    Office Visit    6 months ago Neoplasm of uncertain behavior of skin    [facility], Christiano Talavera MD    Office Visit          Future Appointments         Provider Department Appt Notes    In 1 week Blake Lopez MD [facility] full skin [facility]    In 2 weeks Jessicapaul [place], [facility], [address], [place] nail removal    In 3 months Rima Stern MD [facility], [address] [place] [address] 6M FU               Passed - EGFRCR or GFRNAA > 50     GFR Evaluation  EGFRCR: 84 , resulted on 4/3/2023            Passed - GFR in the past 12 months           Future Appointments Provider Department Appt Notes    In 1 week MD Iglesia Watson [place] full skin check    In 2 weeks Tomeka Perez [facility], [address], [place] nail removal    In 3 months MD Iglesia Cruz, [facility] 6M FU           Recent Outpatient Visits              1 month ago Ingrown toenail of both feet    [facility], [address], [place] Tomeka Perez [place]    Office Visit    2 months ago Annual physical exam    Tim Salgado MD    Office Visit    4 months ago Neoplasm of uncertain behavior of skin    [facility], [facility]    Office Visit    5 months ago Class 2 severe obesity due to excess calories with serious comorbidity and body mass index (BMI) of 37.0 to 37.9 in adult [facility])    Tim Salgado MD    Office Visit    6 months ago Neoplasm of uncertain behavior of skin    Douglas Aguilar [place] Bernarda Conley MD    Office Visit

## 2023-06-16 ENCOUNTER — OFFICE VISIT (OUTPATIENT)
Dept: DERMATOLOGY CLINIC | Facility: CLINIC | Age: 68
End: 2023-06-16

## 2023-06-16 DIAGNOSIS — L82.1 SEBORRHEIC KERATOSES: ICD-10-CM

## 2023-06-16 DIAGNOSIS — D23.9 BENIGN NEOPLASM OF SKIN, UNSPECIFIED LOCATION: ICD-10-CM

## 2023-06-16 DIAGNOSIS — Z85.828 HISTORY OF NONMELANOMA SKIN CANCER: ICD-10-CM

## 2023-06-16 DIAGNOSIS — L57.0 AK (ACTINIC KERATOSIS): Primary | ICD-10-CM

## 2023-06-16 DIAGNOSIS — L81.4 LENTIGO: ICD-10-CM

## 2023-06-16 PROCEDURE — 1126F AMNT PAIN NOTED NONE PRSNT: CPT | Performed by: DERMATOLOGY

## 2023-06-16 PROCEDURE — 1159F MED LIST DOCD IN RCRD: CPT | Performed by: DERMATOLOGY

## 2023-06-16 PROCEDURE — 99213 OFFICE O/P EST LOW 20 MIN: CPT | Performed by: DERMATOLOGY

## 2023-06-16 PROCEDURE — 1160F RVW MEDS BY RX/DR IN RCRD: CPT | Performed by: DERMATOLOGY

## 2023-06-19 ENCOUNTER — TELEPHONE (OUTPATIENT)
Dept: DERMATOLOGY CLINIC | Facility: CLINIC | Age: 68
End: 2023-06-19

## 2023-06-19 NOTE — TELEPHONE ENCOUNTER
LOV 6/16/23 - Dr. Kaitlin Aguila - please see message from pt. Pt asking about a medication that was to be sent to his pharmacy? Unable to locate any medications. Thank you.

## 2023-06-19 NOTE — TELEPHONE ENCOUNTER
Pt was in office 6/16/23,  Was told med being call into pharm .  He check  Pharm there nothings there for hi,m

## 2023-06-20 RX ORDER — IMIQUIMOD 12.5 MG/.25G
CREAM TOPICAL
Qty: 12 EACH | Refills: 1 | Status: SHIPPED | OUTPATIENT
Start: 2023-06-20

## 2023-08-07 NOTE — TELEPHONE ENCOUNTER
Refill Request for medication(s):     Last Office Visit: 6/16/23    Last Refill: 6/20/23    Pharmacy, Dosage verified: yes    Condition Update (if applicable): requested    Rx pended and sent to provider for approval, please advise. Thank You!     Refill

## 2023-08-08 RX ORDER — IMIQUIMOD 12.5 MG/.25G
CREAM TOPICAL
Qty: 12 EACH | Refills: 1 | Status: SHIPPED | OUTPATIENT
Start: 2023-08-08

## 2023-09-06 ENCOUNTER — OFFICE VISIT (OUTPATIENT)
Dept: DERMATOLOGY CLINIC | Facility: CLINIC | Age: 68
End: 2023-09-06

## 2023-09-06 DIAGNOSIS — L57.0 AK (ACTINIC KERATOSIS): Primary | ICD-10-CM

## 2023-09-06 DIAGNOSIS — L81.4 LENTIGO: ICD-10-CM

## 2023-09-06 DIAGNOSIS — L30.9 DERMATITIS: ICD-10-CM

## 2023-09-06 DIAGNOSIS — D23.9 BENIGN NEOPLASM OF SKIN, UNSPECIFIED LOCATION: ICD-10-CM

## 2023-09-06 DIAGNOSIS — Z85.828 HISTORY OF NONMELANOMA SKIN CANCER: ICD-10-CM

## 2023-09-06 DIAGNOSIS — L82.1 SEBORRHEIC KERATOSES: ICD-10-CM

## 2023-09-06 PROCEDURE — 99213 OFFICE O/P EST LOW 20 MIN: CPT | Performed by: DERMATOLOGY

## 2023-09-06 PROCEDURE — 17000 DESTRUCT PREMALG LESION: CPT | Performed by: DERMATOLOGY

## 2023-09-06 PROCEDURE — 1159F MED LIST DOCD IN RCRD: CPT | Performed by: DERMATOLOGY

## 2023-09-06 PROCEDURE — 1160F RVW MEDS BY RX/DR IN RCRD: CPT | Performed by: DERMATOLOGY

## 2023-09-06 PROCEDURE — 17003 DESTRUCT PREMALG LES 2-14: CPT | Performed by: DERMATOLOGY

## 2023-09-23 NOTE — PROGRESS NOTES
Nidia Potter is a 80year old male.   HPI:     CC:  Patient presents with:  Derm Problem: Patient present with spots of concern - LOV 23 - Patient has a hx of [address] - Top of head scaly  and in front of left ear - had a surgery in this area 9 months ago        Allergies:  Penicillins    HISTORY:    Past Medical History:   Diagnosis Date    Basal cell carcinoma 2017    left lower cheek    Basal cell carcinoma 2017    left lower chest    BCC (basal cell carcinoma of skin)     right alar rim    BCC (basal cell carcinoma of skin)     left vertex scalp    BCC (basal cell carcinoma)     left lateral zygoma    BCC (basal cell carcinoma)     left preauricular cheek    BCC (basal cell carcinoma)     left lateral zygoma    BPH (benign prostatic hyperplasia)     Erectile dysfunction     Essential hypertension     Folliculitis 6093    right lateral vertex - chronic folliculitis    GERD (gastroesophageal reflux disease)     Hearing impairment     both ears    Hypercholesterolemia     Recurrent skin cancer       Past Surgical History:   Procedure Laterality Date    ELBOW SURGERY Right 1971    OTHER  2008    Excision BCCa left temporal scalp    OTHER  2008    Mohs surgery BCCa hairline    OTHER  2016    Excision BCCa upper back    OTHER  2017    Excision BCCa left parietal scalp    OTHER  2017    Excision BCCa left lower cheek and left central chest    OTHER  2019    Cystoscopy, meatotomy, urethral dilatation      Family History   Problem Relation Age of Onset    Heart Disease Father          MI age 68    Other (ALS) Father     Heart Disease Paternal Uncle          MI age 62s    Diabetes Maternal Grandfather     Other (Alzheimers) Mother     Cancer Maternal Grandfather         Details unknown    Other (Glioblastoma) Sister       Social History     Socioeconomic History    Marital status:    Tobacco Use    Smoking status: Former     Packs/day: 1.00 Years: 35.00     Additional pack years: 0.00     Total pack years: 35.00     Types: Cigarettes     Quit date: 2022     Years since quittin.6     Passive exposure: Never    Smokeless tobacco: Never   Vaping Use    Vaping Use: Every day   Substance and Sexual Activity    Alcohol use: Yes     Comment: 2 beers or glasses of wine daily    Drug use: No    Sexual activity: Not Currently     Partners: Female   Other Topics Concern    Caffeine Concern Yes     Comment: coffee, 1 cup/day    Outdoor occupation No    Pt has a pacemaker No    Pt has a defibrillator No    Reaction to local anesthetic No        Current Outpatient Medications   Medication Sig Dispense Refill    Imiquimod 5 % External Cream Apply topically 2 times every week to affected area(s). 12 each 1    semaglutide (OZEMPIC, 2 MG/DOSE,) 8 MG/3ML Subcutaneous Solution Pen-injector Inject 2 mg into the skin once a week. 3 mL 5    LANSOPRAZOLE 15 MG Oral Capsule Delayed Release TAKE 1 CAPSULE BY MOUTH EVERY DAY 90 capsule 1    LISINOPRIL-HYDROCHLOROTHIAZIDE 20-25 MG Oral Tab TAKE 1 TABLET BY MOUTH EVERY DAY 90 tablet 1    atorvastatin 20 MG Oral Tab Take 1 tablet (20 mg total) by mouth nightly. 90 tablet 3    tadalafil 5 MG Oral Tab Take 1 tablet (5 mg total) by mouth daily as needed for Erectile Dysfunction.  90 tablet 3    NIFEDIPINE ER 30 MG Oral Tablet 24 Hr TAKE 1 TABLET BY MOUTH EVERY DAY 90 tablet 1     Allergies:     Penicillins             FACE FLUSHING    Past Medical History:   Diagnosis Date    Basal cell carcinoma 2017    left lower cheek    Basal cell carcinoma 2017    left lower chest    BCC (basal cell carcinoma of skin)     right alar rim    BCC (basal cell carcinoma of skin)     left vertex scalp    BCC (basal cell carcinoma)     left lateral zygoma    BCC (basal cell carcinoma)     left preauricular cheek    BCC (basal cell carcinoma)     left lateral zygoma    BPH (benign prostatic hyperplasia)     Erectile dysfunction     Essential hypertension     Folliculitis 8120    right lateral vertex - chronic folliculitis    GERD (gastroesophageal reflux disease)     Hearing impairment     both ears    Hypercholesterolemia     Recurrent skin cancer      Past Surgical History:   Procedure Laterality Date    ELBOW SURGERY Right 1971    OTHER  2008    Excision BCCa left temporal scalp    OTHER  2008    Mohs surgery BCCa hairline    OTHER  2016    Excision BCCa upper back    OTHER  2017    Excision BCCa left parietal scalp    OTHER  2017    Excision BCCa left lower cheek and left central chest    OTHER  2019    Cystoscopy, meatotomy, urethral dilatation     Social History    Socioeconomic History      Marital status:       Spouse name: Not on file      Number of children: Not on file      Years of education: Not on file      Highest education level: Not on file    Occupational History      Not on file    Tobacco Use      Smoking status: Former        Packs/day: 1.00        Years: 35.00        Additional pack years: 0.00        Total pack years: 35.00        Types: Cigarettes        Quit date: 2022        Years since quittin.6        Passive exposure: Never      Smokeless tobacco: Never    Vaping Use      Vaping Use: Every day    Substance and Sexual Activity      Alcohol use: Yes        Comment: 2 beers or glasses of wine daily      Drug use: No      Sexual activity: Not Currently        Partners: Female    Other Topics      Concerns:         Service: Not Asked        Blood Transfusions: Not Asked        Caffeine Concern: Yes          coffee, 1 cup/day        Occupational Exposure: Not Asked        Hobby Hazards: Not Asked        Sleep Concern: Not Asked        Stress Concern: Not Asked        Weight Concern: Not Asked        Special Diet: Not Asked        Back Care: Not Asked        Exercise: Not Asked        Bike Helmet: Not Asked        Seat Belt: Not Asked Self-Exams: Not Asked        Grew up on a farm: Not Asked        History of tanning: Not Asked        Outdoor occupation: No        Pt has a pacemaker: No        Pt has a defibrillator: No        Reaction to local anesthetic: No    Social History Narrative      Not on file    Social Determinants of Health  Financial Resource Strain: Not on file  Food Insecurity: Not on file  Transportation Needs: Not on file  Physical Activity: Not on file  Stress: Not on file  Social Connections: Not on file  Housing Stability: Not on file  Family History   Problem Relation Age of Onset    Heart Disease Father          MI age 68    Other (ALS) Father     Heart Disease Paternal Uncle          MI age 62s    Diabetes Maternal Grandfather     Other (Alzheimers) Mother     Cancer Maternal Grandfather         Details unknown    Other (Glioblastoma) Sister        There were no vitals filed for this visit. HPI:    Patient presents with:  Derm Problem: Patient present with spots of concern - LOV 23 - Patient has a hx of BCC - Top of head scaly  and in front of left ear - had a surgery in this area 9 months ago    Follow-up patient with history BCC, AK's, history of imiquimod, ENC for above lesions. chest recent Mohs for [facility] left zygoma, left preauricular cheek Dr. Nita Lombard   generally careful with sunscreen   no recent surgeries. Has not noted any other lesions of concern      Note scaly area on back  Patient presents with concerns above. Past notes/ records and appropriate/relevant lab results including pathology and past body maps reviewed. Updated and new information noted in current visit. Patient has been in their usual state of health. History, medications, allergies reviewed as noted. ROS:  Denies any other systemic complaints. No new or changeing lesions other than noted above. No fevers, chills, night sweats, unusual sun sensitivity. No other skin complaints.         History, medications, allergies reviewed as noted. Physical Examination:     Well-developed well-nourished patient alert oriented in no acute distress. Exam total-body performed, including scalp, head, neck, face,nails, hair, external eyes, including conjunctival mucosa, eyelids, lips external ears, back, chest,/ breasts, axillae,  abdomen, arms, legs, palms. Multiple light to medium brown, well marginated, uniformly pigmented, macules and papules 6 mm and less scattered on exam. pigmented lesions examined with dermoscopy benign-appearing patterns. Waxy tannish keratotic papules scattered, cherry-red vascular papules scattered. See map today's date for lesions noted . Otherwise remarkable for lesions as noted on map. See details of examination  See Assessment /Plan for additional history and physical exam also:    Assessment / plan:    No orders of the defined types were placed in this encounter. Meds & Refills for this Visit:  Requested Prescriptions      No prescriptions requested or ordered in this encounter         Ak (actinic keratosis)  (primary encounter diagnosis)  Seborrheic keratoses  Benign neoplasm of skin, unspecified location  Lentigo  History of nonmelanoma skin cancer  Dermatitis    See details on map. Remarkable for:  Erythematous scaling keratotic papules noted at sites noted on map  Actinic Keratoses. Precancerous nature discussed. Sun protection, sunscreen/ blocks encouraged Lesions treated with cryo- . Biopsy if not resolved. Vertex asked to    Cystic lesion at preauricular cheek incision line monitor carefully. If this enlarges he will follow-up. Trial of intralesional Kenalog to cystic lesion midline. Patient has had reexcision of this area. Continue careful monitoring recheck in a couple of months if needed no evidence of recurrent, new skin cancer in this area    Complains of itching irritation at right scapula  Dermatitis. Meds in grid.   Skin care instructions reviewed. [place] use of emollients. Pathophysiology reviewed. Consider Contac allergy in differential.  Consider patch testing. Patient will let us know how they are doing over the next several weeks. Await clinical response to above therapies. Over-the-counter hydrocortisone      Multiple skin cancers, extensive AK's in the past.  Post Efudex doing better. Consider repeat course. Left lateral zygoma, left preauricular cheek BCC post Mohs  [id number]  Repeat Mohs left lateral zygoma 2023. Multiple basal cell carcinomas no recurrence. Continue careful monitoring no other suspicious lesions no other significant changes in exam.  Encouraged more consistent use of sunscreen while driving given new lesions on the left side      Continue careful sun protection right lateral vertex biopsy benign folliculitis last office visit 6/20/2020    Other areas of prior skin cancer at the scalp clear pinkish patches consistent with noted. Slightly erythematous anterior portion of central vertex. Continue careful monitoring. Postauricular neck probable BCC resolved with Aldara   chest clear. No recurrence prior BCC. No other new suspicious lesions  Pinkish patch upper back observe. Possible aldara    Multiple benign keratoses. Moderate sun damage no other suspicious lesions  Other benign nevi lentigines    Continue careful monitoring patient at high risk for additional skin cancers recheck in 3 to 4 months. Please refer to map for specific lesions. See additional diagnoses. Pros cons of various therapies, risks benefits discussed. Pathophysiology discussed with patient. Therapeutic options reviewed. See  Medications in grid. Instructions reviewed at length. Benign nevi, seborrheic  keratoses, cherry angiomas:  Reassurance regarding other benign skin lesions. Signs and symptoms of skin cancer, ABCDE's of melanoma discussed with patient. Sunscreen use, sun protection, self exams reviewed.   Followup as noted RTC routine checkup approximately 4 months or p.r.n. Encounter Times  Including precharting, reviewing chart, prior notes obtaining history: 5 minutes, medical exam :10 minutes, notes on body map, plan, counseling 10minutes My total time spent caring for the patient on the day of the encounter: 25 minutes       The patient indicates understanding of these issues and agrees to the plan. The patient is asked to return as noted in follow-up/ above. This note was generated using Dragon voice recognition software. Please contact me regarding any confusion resulting from errors in recognition. Note to patient and family: The [facility] makes medical notes like these available to patients. However, be advised this is a medical document. It is intended as foew-ce-xppv communication and monitoring of a patient's care needs. It is written in medical language and may contain abbreviations or verbiage that are unfamiliar. It may appear blunt or direct. Medical documents are intended to carry relevant information, facts as evident and the clinical opinion of the practitioner.

## 2023-09-26 ENCOUNTER — LAB ENCOUNTER (OUTPATIENT)
Dept: LAB | Facility: HOSPITAL | Age: 68
End: 2023-09-26
Attending: INTERNAL MEDICINE
Payer: MEDICARE

## 2023-09-26 DIAGNOSIS — E78.00 HYPERCHOLESTEROLEMIA: ICD-10-CM

## 2023-09-26 LAB
CHOLEST SERPL-MCNC: 120 MG/DL (ref ?–200)
FASTING PATIENT LIPID ANSWER: YES
HDLC SERPL-MCNC: 67 MG/DL (ref 40–59)
LDLC SERPL CALC-MCNC: 42 MG/DL (ref ?–100)
NONHDLC SERPL-MCNC: 53 MG/DL (ref ?–130)
TRIGL SERPL-MCNC: 43 MG/DL (ref 30–149)
VLDLC SERPL CALC-MCNC: 6 MG/DL (ref 0–30)

## 2023-09-26 PROCEDURE — 80061 LIPID PANEL: CPT

## 2023-09-26 PROCEDURE — 36415 COLL VENOUS BLD VENIPUNCTURE: CPT

## 2023-10-03 ENCOUNTER — OFFICE VISIT (OUTPATIENT)
Dept: INTERNAL MEDICINE CLINIC | Facility: CLINIC | Age: 68
End: 2023-10-03

## 2023-10-03 VITALS
HEIGHT: 70 IN | DIASTOLIC BLOOD PRESSURE: 70 MMHG | BODY MASS INDEX: 29.75 KG/M2 | HEART RATE: 77 BPM | SYSTOLIC BLOOD PRESSURE: 126 MMHG | WEIGHT: 207.81 LBS

## 2023-10-03 DIAGNOSIS — M72.2 PLANTAR FASCIITIS, LEFT: ICD-10-CM

## 2023-10-03 DIAGNOSIS — E78.00 HYPERCHOLESTEROLEMIA: ICD-10-CM

## 2023-10-03 DIAGNOSIS — I10 ESSENTIAL HYPERTENSION: Primary | ICD-10-CM

## 2023-10-03 PROCEDURE — 3074F SYST BP LT 130 MM HG: CPT | Performed by: INTERNAL MEDICINE

## 2023-10-03 PROCEDURE — 1160F RVW MEDS BY RX/DR IN RCRD: CPT | Performed by: INTERNAL MEDICINE

## 2023-10-03 PROCEDURE — 99214 OFFICE O/P EST MOD 30 MIN: CPT | Performed by: INTERNAL MEDICINE

## 2023-10-03 PROCEDURE — 1126F AMNT PAIN NOTED NONE PRSNT: CPT | Performed by: INTERNAL MEDICINE

## 2023-10-03 PROCEDURE — 3078F DIAST BP <80 MM HG: CPT | Performed by: INTERNAL MEDICINE

## 2023-10-03 PROCEDURE — 1159F MED LIST DOCD IN RCRD: CPT | Performed by: INTERNAL MEDICINE

## 2023-10-03 PROCEDURE — 3008F BODY MASS INDEX DOCD: CPT | Performed by: INTERNAL MEDICINE

## 2023-10-03 RX ORDER — ATORVASTATIN CALCIUM 10 MG/1
10 TABLET, FILM COATED ORAL NIGHTLY
Qty: 90 TABLET | Refills: 1 | Status: SHIPPED | OUTPATIENT
Start: 2023-10-03

## 2023-10-03 RX ORDER — LISINOPRIL AND HYDROCHLOROTHIAZIDE 12.5; 1 MG/1; MG/1
1 TABLET ORAL DAILY
Qty: 90 TABLET | Refills: 1 | Status: SHIPPED | OUTPATIENT
Start: 2023-10-03 | End: 2024-09-27

## 2023-10-03 NOTE — PATIENT INSTRUCTIONS
Your blood pressure today was excellent at 126/70. Continue your current medications and schedule a Medicare physical in 6 months. Please apply ice to your left heel 2-3 times a day and do gentle stretching exercises.

## 2023-10-26 RX ORDER — IMIQUIMOD 12.5 MG/.25G
CREAM TOPICAL
Refills: 1 | OUTPATIENT
Start: 2023-10-26

## 2023-10-26 NOTE — TELEPHONE ENCOUNTER
Refill Request for medication(s):     Last Office Visit: 9/6/23     Last Refill: 8/8/23     Pharmacy, Dosage verified:  yes;  [id number] [place] - [address] AT [facility], [phone], [phone]     Condition Update (if applicable): Marinat sent 10/26    Rx pended and sent to provider for approval, please advise. Thank You!

## 2023-11-10 ENCOUNTER — TELEPHONE (OUTPATIENT)
Dept: SURGERY | Facility: CLINIC | Age: 68
End: 2023-11-10

## 2023-11-10 NOTE — TELEPHONE ENCOUNTER
Received order request form for catheter supplies. Pt was seen on 11/08/22 and has appt schedule for 11/28/23 with Liang Griffin. I will place form of 's desk to review and sign if appropriate.

## 2023-11-22 RX ORDER — SEMAGLUTIDE 2.68 MG/ML
2 INJECTION, SOLUTION SUBCUTANEOUS WEEKLY
Qty: 3 ML | Refills: 5 | Status: SHIPPED | OUTPATIENT
Start: 2023-11-22

## 2023-11-22 NOTE — TELEPHONE ENCOUNTER
Please review. Protocol failed or has no protocol. Requested Prescriptions   Pending Prescriptions Disp Refills    semaglutide (OZEMPIC, 2 MG/DOSE,) 8 MG/3ML Subcutaneous Solution Pen-injector 3 mL 5     Sig: Inject 2 mg into the skin once a week.        Diabetes Medication Protocol Failed - 11/20/2023  7:43 PM        Failed - Last A1C < 7.5 and within past 6 months     No results found for: \"A1C\"          Passed - In person appointment or virtual visit in the past 6 mos or appointment in next 3 mos     Recent Outpatient Visits              1 month ago Essential hypertension    [address], Kathy Luque MD    Office Visit    2 months ago AK (actinic keratosis)    Bharathi Lamas MD    Office Visit    5 months ago AK (actinic keratosis)    Bharathi Lamas MD    Office Visit    6 months ago Ingrown toenail of both feet    [address], [address] [facility],[address], [place]    Office Visit    7 months ago Annual physical exam    Lillie Ortiz MD    Office Visit          Future Appointments         Provider Department Appt Notes    In 6 days Kasie Anders MD [address], [address], [place] Refill catheter and tadalafil prescriptions               Passed - EGFRCR or GFRNAA > 50     GFR Evaluation  EGFRCR: 84 , resulted on 4/3/2023          Passed - GFR in the past 12 months             Recent Outpatient Visits              1 month ago Essential hypertension    Lillie Ortiz MD    Office Visit    2 months ago AK (actinic keratosis)    Bharathi Lamas MD    Office Visit    5 months ago AK (actinic keratosis)    [facility] G[facility]    Office Visit    6 months ago Ingrown toenail of both feet    [facility], [address], [place], [place]    Office Visit    7 months ago Annual physical exam    Sarika Delgado MD    Office Visit            Future Appointments         Provider Department Appt Notes    In 6 days Funmi Nash MD [address] [facility] [address], [address], [place] Refill catheter and tadalafil prescriptions

## 2023-11-28 NOTE — PROGRESS NOTES
Amelia Flowers is a 80year old male. HPI:     Chief Complaint   Patient presents with    Follow - Up    Urinary Symptoms     Pt c/o nocturia, pt still doing CIC 3 to 4 x weekly       69-year-old male with a history of meatal stenosis status post cystoscopy and dilation in 2019, continues to use a 16 Japanese straight catheter to dilate the distal meatus about 3-4 times a week. No problems or concerns. I last saw the patient November 8, 2022. He continues to be on tadalafil 5 mg daily and has excellent symptoms at present. He also reports improved erections on the medication. PSA April 3, 2023 1.37 stable. No bone pain or weight loss gross hematuria or dysuria is reported. He tells me he is lost more than 70 pounds through diet exercise and medications.       HISTORY:  Past Medical History:   Diagnosis Date    Basal cell carcinoma 2017    left lower cheek    Basal cell carcinoma 2017    left lower chest    BCC (basal cell carcinoma of skin) 2017    right alar rim    BCC (basal cell carcinoma of skin) 2017    left vertex scalp    BCC (basal cell carcinoma) 2022    left lateral zygoma    BCC (basal cell carcinoma) 2022    left preauricular cheek    BCC (basal cell carcinoma) 2023    left lateral zygoma    BPH (benign prostatic hyperplasia)     Erectile dysfunction     Essential hypertension     Folliculitis 9789    right lateral vertex - chronic folliculitis    GERD (gastroesophageal reflux disease)     Hearing impairment     both ears    Hypercholesterolemia     Recurrent skin cancer       Past Surgical History:   Procedure Laterality Date    ELBOW SURGERY Right 1971    OTHER  September 2008    Excision BCCa left temporal scalp    OTHER  November 2008    Mohs surgery BCCa hairline    OTHER  July 2016    Excision BCCa upper back    OTHER  08/2017    Excision BCCa left parietal scalp    OTHER  12/2017    Excision BCCa left lower cheek and left central chest    OTHER  07/2019    Cystoscopy, meatotomy, urethral dilatation      Family History   Problem Relation Age of Onset    Heart Disease Father          MI age 68    Other (ALS) Father     Heart Disease Paternal Uncle          MI age 62s    Diabetes Maternal Grandfather     Other (Alzheimers) Mother     Cancer Maternal Grandfather         Details unknown    Other (Glioblastoma) Sister       Social History:   Social History     Socioeconomic History    Marital status:    Tobacco Use    Smoking status: Former     Packs/day: 1.00     Years: 35.00     Additional pack years: 0.00     Total pack years: 35.00     Types: Cigarettes     Quit date: 2022     Years since quittin.8     Passive exposure: Never    Smokeless tobacco: Never   Vaping Use    Vaping Use: Every day   Substance and Sexual Activity    Alcohol use: Yes     Comment: 2 beers or glasses of wine daily    Drug use: No    Sexual activity: Not Currently     Partners: Female   Other Topics Concern    Caffeine Concern Yes     Comment: coffee, 1 cup/day    Outdoor occupation No    Pt has a pacemaker No    Pt has a defibrillator No    Reaction to local anesthetic No        Medications (Active prior to today's visit):  Current Outpatient Medications   Medication Sig Dispense Refill    tadalafil 5 MG Oral Tab Take 1 tablet (5 mg total) by mouth daily as needed for Erectile Dysfunction. 90 tablet 3    semaglutide (OZEMPIC, 2 MG/DOSE,) 8 MG/3ML Subcutaneous Solution Pen-injector Inject 2 mg into the skin once a week. 3 mL 5    atorvastatin 10 MG Oral Tab Take 1 tablet (10 mg total) by mouth nightly. 90 tablet 1    lisinopril-hydroCHLOROthiazide 10-12.5 MG Oral Tab Take 1 tablet by mouth daily. 90 tablet 1    LANSOPRAZOLE 15 MG Oral Capsule Delayed Release TAKE 1 CAPSULE BY MOUTH EVERY DAY 90 capsule 1    Imiquimod 5 % External Cream Apply topically 2 times every week to affected area(s). 12 each 1       Allergies:   Allergies   Allergen Reactions    Penicillins FACE FLUSHING         ROS: PHYSICAL EXAM:   Digital prostate exam demonstrates a normal sphincter tone, prostate is about 45 g smooth symmetric without masses or nodules. ASSESSMENT/PLAN:   Assessment   Encounter Diagnoses   Name Primary? ED (erectile dysfunction) of non-organic origin Yes    Congenital meatal stenosis        Recommend:  - Continue on tadalafil 5 mg daily. - Continue to catheterize 3-4 times a week to dilate his distal urethra with a 16 French straight catheter indefinitely. - Follow-up otherwise in 1 year. I spent a total of 25 minutes with patient more than half the time in face-to-face discussion. Orders This Visit:  No orders of the defined types were placed in this encounter. Meds This Visit:  Requested Prescriptions     Signed Prescriptions Disp Refills    tadalafil 5 MG Oral Tab 90 tablet 3     Sig: Take 1 tablet (5 mg total) by mouth daily as needed for Erectile Dysfunction.        Imaging & Referrals:  None     11/28/2023  Diana Panchal MD

## 2023-11-29 ENCOUNTER — TELEPHONE (OUTPATIENT)
Dept: SURGERY | Facility: CLINIC | Age: 68
End: 2023-11-29

## 2023-11-29 NOTE — TELEPHONE ENCOUNTER
Received fax from [employer] requesting prior authorization for Tadalafil 5mg. KEY: [id number]    Will submit through cover my meds.

## 2023-12-04 RX ORDER — TADALAFIL 5 MG/1
5 TABLET ORAL DAILY PRN
Qty: 90 TABLET | Refills: 0 | OUTPATIENT
Start: 2023-12-04

## 2023-12-05 NOTE — TELEPHONE ENCOUNTER
Received fax denial from [facility] [employer] for tadalafil 5mg daily. I called pt verified name/ pt is aware med was denied by insurance he is using a [employer] coupon paying $19.00 for 90 day supply.

## 2023-12-15 RX ORDER — SEMAGLUTIDE 2.68 MG/ML
2 INJECTION, SOLUTION SUBCUTANEOUS WEEKLY
Qty: 3 EACH | Refills: 5 | Status: SHIPPED | OUTPATIENT
Start: 2023-12-15

## 2023-12-18 DIAGNOSIS — Z00.00 ANNUAL PHYSICAL EXAM: ICD-10-CM

## 2023-12-18 RX ORDER — MECOBALAMIN 5000 MCG
15 TABLET,DISINTEGRATING ORAL DAILY
Qty: 90 CAPSULE | Refills: 1 | Status: SHIPPED | OUTPATIENT
Start: 2023-12-18

## 2023-12-18 NOTE — TELEPHONE ENCOUNTER
Refill Request for medication(s):   Imiquimod    Last Office Visit: 9/6/23    Last Refill: 8/8/23    Pharmacy, Dosage verified: yes    Condition Update (if applicable):     Rx pended and sent to provider for approval, please advise. Thank You!

## 2023-12-19 RX ORDER — IMIQUIMOD 12.5 MG/.25G
CREAM TOPICAL
Refills: 1 | OUTPATIENT
Start: 2023-12-19

## 2024-01-15 ENCOUNTER — OFFICE VISIT (OUTPATIENT)
Dept: INTERNAL MEDICINE CLINIC | Facility: CLINIC | Age: 69
End: 2024-01-15
Payer: MEDICARE

## 2024-01-15 VITALS
HEIGHT: 70 IN | DIASTOLIC BLOOD PRESSURE: 72 MMHG | SYSTOLIC BLOOD PRESSURE: 124 MMHG | BODY MASS INDEX: 28.46 KG/M2 | WEIGHT: 198.81 LBS | HEART RATE: 83 BPM

## 2024-01-15 DIAGNOSIS — I10 ESSENTIAL HYPERTENSION: ICD-10-CM

## 2024-01-15 DIAGNOSIS — R10.31 RIGHT GROIN PAIN: Primary | ICD-10-CM

## 2024-01-15 PROCEDURE — 3074F SYST BP LT 130 MM HG: CPT | Performed by: INTERNAL MEDICINE

## 2024-01-15 PROCEDURE — 3008F BODY MASS INDEX DOCD: CPT | Performed by: INTERNAL MEDICINE

## 2024-01-15 PROCEDURE — 99214 OFFICE O/P EST MOD 30 MIN: CPT | Performed by: INTERNAL MEDICINE

## 2024-01-15 PROCEDURE — 1160F RVW MEDS BY RX/DR IN RCRD: CPT | Performed by: INTERNAL MEDICINE

## 2024-01-15 PROCEDURE — 1126F AMNT PAIN NOTED NONE PRSNT: CPT | Performed by: INTERNAL MEDICINE

## 2024-01-15 PROCEDURE — 1159F MED LIST DOCD IN RCRD: CPT | Performed by: INTERNAL MEDICINE

## 2024-01-15 PROCEDURE — 3078F DIAST BP <80 MM HG: CPT | Performed by: INTERNAL MEDICINE

## 2024-01-15 NOTE — PATIENT INSTRUCTIONS
Please avoid painful activity, perform gentle stretching exercises, and apply heat to the affected area 2-3 times daily.  Call if no better.  Your blood pressure today was excellent at 124/72.  I will see you for your Medicare physical in April.

## 2024-01-15 NOTE — PROGRESS NOTES
Anish Simental is a 68 year old male.   Chief Complaint   Patient presents with    Groin Pain            HPI:   For the past 2 weeks, he has had intermittent pain in his right groin.  Pain seems worse with longer walks and also when playing pickle ball.  He also recently shoveled snow and that also worsened pain.  He thinks he may have noticed some swelling in his right groin and is concerned about a possible hernia.  He has applied ice without relief.  He has taken ibuprofen without significant benefit.  He has not applied heat.  He does not have pain constantly, and he has no pain now.  He walked earlier today and played pickle ball without any discomfort.    Recent BP checks 110s/70s.  No headaches.  No lightheadedness or dizziness.  No palpitations or chest pain.  No shortness of breath.    Medications reviewed, as listed below.  Physical scheduled in April.  Current Outpatient Medications   Medication Sig Dispense Refill    Lansoprazole 15 MG Oral Capsule Delayed Release Take 1 capsule (15 mg total) by mouth daily. 90 capsule 1    semaglutide (OZEMPIC, 2 MG/DOSE,) 8 MG/3ML Subcutaneous Solution Pen-injector Inject 2 mg into the skin once a week. 3 each 5    tadalafil 5 MG Oral Tab Take 1 tablet (5 mg total) by mouth daily as needed for Erectile Dysfunction. 90 tablet 3    lisinopril-hydroCHLOROthiazide 10-12.5 MG Oral Tab Take 1 tablet by mouth daily. 90 tablet 1    atorvastatin 10 MG Oral Tab Take 1 tablet (10 mg total) by mouth nightly. (Patient not taking: Reported on 1/15/2024) 90 tablet 1     Allergies   Allergen Reactions    Penicillins FACE FLUSHING      Past Medical History:   Diagnosis Date    Basal cell carcinoma 2017    left lower cheek    Basal cell carcinoma 2017    left lower chest    BCC (basal cell carcinoma of skin) 2017    right alar rim    BCC (basal cell carcinoma of skin) 2017    left vertex scalp    BCC (basal cell carcinoma) 2022    left lateral zygoma    BCC (basal cell  carcinoma)     left preauricular cheek    BCC (basal cell carcinoma)     left lateral zygoma    BPH (benign prostatic hyperplasia)     Erectile dysfunction     Essential hypertension     Folliculitis     right lateral vertex - chronic folliculitis    GERD (gastroesophageal reflux disease)     Hearing impairment     both ears    Hypercholesterolemia     Recurrent skin cancer      Past Surgical History:   Procedure Laterality Date    ELBOW SURGERY Right 1971    OTHER  2008    Excision BCCa left temporal scalp    OTHER  2008    Mohs surgery BCCa hairline    OTHER  2016    Excision BCCa upper back    OTHER  2017    Excision BCCa left parietal scalp    OTHER  2017    Excision BCCa left lower cheek and left central chest    OTHER  2019    Cystoscopy, meatotomy, urethral dilatation      Social History:  Social History     Socioeconomic History    Marital status:    Tobacco Use    Smoking status: Former     Packs/day: 1.00     Years: 35.00     Additional pack years: 0.00     Total pack years: 35.00     Types: Cigarettes     Quit date: 2022     Years since quittin.0     Passive exposure: Never    Smokeless tobacco: Never   Vaping Use    Vaping Use: Every day   Substance and Sexual Activity    Alcohol use: Yes     Comment: 2 beers or glasses of wine daily    Drug use: No    Sexual activity: Not Currently     Partners: Female   Other Topics Concern    Caffeine Concern Yes     Comment: coffee, 1 cup/day    Outdoor occupation No    Pt has a pacemaker No    Pt has a defibrillator No    Reaction to local anesthetic No        EXAM:   GENERAL: Pleasant male appearing well in no distress  /72   Pulse 83   Ht 5' 10\" (1.778 m)   Wt 198 lb 12.8 oz (90.2 kg)   BMI 28.52 kg/m²   LUNGS: Resonant to percussion and clear to auscultation  CARDIAC: Rhythm regular S1 S2 normal without murmur   ABDOMEN: Bowel sounds normal soft nontender without mass or  hepatosplenomegaly  EXTREMITIES: Normal range of motion right hip to flexion extension internal rotation and external rotation without limitation or pain  GENITAL: Testes normal without inguinal hernia      ASSESSMENT AND PLAN:   1. Right groin pain  Likely muscle strain.  Recommend rest, gentle stretching exercises, heat application 2-3 times daily.  Call if not soon better.    2. Essential hypertension  Well-controlled.  Continue current medication.  Medicare physical in April.      The patient indicates understanding of these issues and agrees to the plan.  The patient is asked to return in April.    Stan Martin MD  1/15/2024  10:12 AM

## 2024-02-16 ENCOUNTER — TELEPHONE (OUTPATIENT)
Dept: INTERNAL MEDICINE CLINIC | Facility: CLINIC | Age: 69
End: 2024-02-16

## 2024-02-16 RX ORDER — SEMAGLUTIDE 2.68 MG/ML
2 INJECTION, SOLUTION SUBCUTANEOUS WEEKLY
Qty: 3 EACH | Refills: 5 | Status: SHIPPED | OUTPATIENT
Start: 2024-02-16

## 2024-02-16 NOTE — TELEPHONE ENCOUNTER
Dr. Martin, please clarify diagnosis, I do not see that he is a diabetic.  Ok to use obesity diagnosis?  E66.09?

## 2024-02-16 NOTE — TELEPHONE ENCOUNTER
Missing/Illegible Information on RX -Further Clarification:  IDC 10 Code required to bill medicare D    Current Outpatient Medications   Medication Sig Dispense Refill           semaglutide (OZEMPIC, 2 MG/DOSE,) 8 MG/3ML Subcutaneous Solution Pen-injector Inject 2 mg into the skin once a week. 3 each 5

## 2024-03-07 ENCOUNTER — OFFICE VISIT (OUTPATIENT)
Dept: INTERNAL MEDICINE CLINIC | Facility: CLINIC | Age: 69
End: 2024-03-07

## 2024-03-07 ENCOUNTER — NURSE TRIAGE (OUTPATIENT)
Dept: INTERNAL MEDICINE CLINIC | Facility: CLINIC | Age: 69
End: 2024-03-07

## 2024-03-07 VITALS
BODY MASS INDEX: 28.49 KG/M2 | HEART RATE: 89 BPM | RESPIRATION RATE: 18 BRPM | DIASTOLIC BLOOD PRESSURE: 68 MMHG | SYSTOLIC BLOOD PRESSURE: 122 MMHG | TEMPERATURE: 98 F | HEIGHT: 70 IN | WEIGHT: 199 LBS

## 2024-03-07 DIAGNOSIS — K40.90 NON-RECURRENT UNILATERAL INGUINAL HERNIA WITHOUT OBSTRUCTION OR GANGRENE: Primary | ICD-10-CM

## 2024-03-07 PROCEDURE — 3074F SYST BP LT 130 MM HG: CPT | Performed by: INTERNAL MEDICINE

## 2024-03-07 PROCEDURE — 1160F RVW MEDS BY RX/DR IN RCRD: CPT | Performed by: INTERNAL MEDICINE

## 2024-03-07 PROCEDURE — 3008F BODY MASS INDEX DOCD: CPT | Performed by: INTERNAL MEDICINE

## 2024-03-07 PROCEDURE — 1159F MED LIST DOCD IN RCRD: CPT | Performed by: INTERNAL MEDICINE

## 2024-03-07 PROCEDURE — 1125F AMNT PAIN NOTED PAIN PRSNT: CPT | Performed by: INTERNAL MEDICINE

## 2024-03-07 PROCEDURE — 3078F DIAST BP <80 MM HG: CPT | Performed by: INTERNAL MEDICINE

## 2024-03-07 PROCEDURE — 99214 OFFICE O/P EST MOD 30 MIN: CPT | Performed by: INTERNAL MEDICINE

## 2024-03-07 NOTE — PROGRESS NOTES
Patient ID: Anish Simental is a 68 year old male.  No chief complaint on file.       HISTORY OF PRESENT ILLNESS:   HPI  Patient presents for above.  Here with right groin pain and swelling.  States 6 weeks ago he injured his right groin playing pickle ball.  He did see his PCP and was told to not play pickle ball until fully heals.  Patient admittedly continued to play pickle ball and now has developed further groin pain with a bulge.  States he has to strain sometimes to defecate.  No nausea or vomiting.    Review of Systems   Constitutional: Negative.    HENT: Negative.     Eyes: Negative.    Respiratory: Negative.     Cardiovascular: Negative.    Gastrointestinal: Negative.    Endocrine: Negative.    Genitourinary:  Positive for scrotal swelling.   Musculoskeletal: Negative.    Skin: Negative.    Allergic/Immunologic: Negative.    Neurological: Negative.    Hematological: Negative.    Psychiatric/Behavioral: Negative.       MEDICAL HISTORY:     Past Medical History:   Diagnosis Date    Basal cell carcinoma 2017    left lower cheek    Basal cell carcinoma 2017    left lower chest    BCC (basal cell carcinoma of skin) 2017    right alar rim    BCC (basal cell carcinoma of skin) 2017    left vertex scalp    BCC (basal cell carcinoma) 2022    left lateral zygoma    BCC (basal cell carcinoma) 2022    left preauricular cheek    BCC (basal cell carcinoma) 2023    left lateral zygoma    BPH (benign prostatic hyperplasia)     Erectile dysfunction     Essential hypertension     Folliculitis 2020    right lateral vertex - chronic folliculitis    GERD (gastroesophageal reflux disease)     Hearing impairment     both ears    Hypercholesterolemia     Recurrent skin cancer        Past Surgical History:   Procedure Laterality Date    ELBOW SURGERY Right 1971    OTHER  September 2008    Excision BCCa left temporal scalp    OTHER  November 2008    Mohs surgery BCCa hairline    OTHER  July 2016    Excision BCCa upper back     OTHER  2017    Excision BCCa left parietal scalp    OTHER  2017    Excision BCCa left lower cheek and left central chest    OTHER  2019    Cystoscopy, meatotomy, urethral dilatation         Current Outpatient Medications:     semaglutide (OZEMPIC, 2 MG/DOSE,) 8 MG/3ML Subcutaneous Solution Pen-injector, Inject 2 mg into the skin once a week., Disp: 3 each, Rfl: 5    Lansoprazole 15 MG Oral Capsule Delayed Release, Take 1 capsule (15 mg total) by mouth daily., Disp: 90 capsule, Rfl: 1    tadalafil 5 MG Oral Tab, Take 1 tablet (5 mg total) by mouth daily as needed for Erectile Dysfunction., Disp: 90 tablet, Rfl: 3    lisinopril-hydroCHLOROthiazide 10-12.5 MG Oral Tab, Take 1 tablet by mouth daily., Disp: 90 tablet, Rfl: 1    atorvastatin 10 MG Oral Tab, Take 1 tablet (10 mg total) by mouth nightly. (Patient not taking: Reported on 3/7/2024), Disp: 90 tablet, Rfl: 1    Allergies:  Allergies   Allergen Reactions    Penicillins FACE FLUSHING       Social History     Socioeconomic History    Marital status:      Spouse name: Not on file    Number of children: Not on file    Years of education: Not on file    Highest education level: Not on file   Occupational History    Not on file   Tobacco Use    Smoking status: Former     Packs/day: 1.00     Years: 35.00     Additional pack years: 0.00     Total pack years: 35.00     Types: Cigarettes     Quit date: 2022     Years since quittin.1     Passive exposure: Never    Smokeless tobacco: Never   Vaping Use    Vaping Use: Every day   Substance and Sexual Activity    Alcohol use: Yes     Comment: 2 beers or glasses of wine daily    Drug use: No    Sexual activity: Not Currently     Partners: Female   Other Topics Concern     Service Not Asked    Blood Transfusions Not Asked    Caffeine Concern Yes     Comment: coffee, 1 cup/day    Occupational Exposure Not Asked    Hobby Hazards Not Asked    Sleep Concern Not Asked    Stress Concern Not Asked     Weight Concern Not Asked    Special Diet Not Asked    Back Care Not Asked    Exercise Not Asked    Bike Helmet Not Asked    Seat Belt Not Asked    Self-Exams Not Asked    Grew up on a farm Not Asked    History of tanning Not Asked    Outdoor occupation No    Pt has a pacemaker No    Pt has a defibrillator No    Reaction to local anesthetic No   Social History Narrative    Not on file     Social Determinants of Health     Financial Resource Strain: Not on file   Food Insecurity: Not on file   Transportation Needs: Not on file   Physical Activity: Not on file   Stress: Not on file   Social Connections: Not on file   Housing Stability: Not on file           PHYSICAL EXAM:     Vitals:    03/07/24 1006   BP: 122/68   Pulse: 89   Resp: 18   Temp: 97.7 °F (36.5 °C)   TempSrc: Temporal   Weight: 199 lb (90.3 kg)   Height: 5' 10\" (1.778 m)       Body mass index is 28.55 kg/m².    Physical Exam  Constitutional:       Appearance: Normal appearance.   Eyes:      General: No scleral icterus.  Cardiovascular:      Rate and Rhythm: Normal rate and regular rhythm.      Pulses: Normal pulses.      Heart sounds: Normal heart sounds. No murmur heard.  Pulmonary:      Effort: Pulmonary effort is normal. No respiratory distress.      Breath sounds: Normal breath sounds. No stridor. No wheezing or rhonchi.   Abdominal:      Hernia: A hernia is present. Hernia is present in the right inguinal area.   Genitourinary:      Neurological:      Mental Status: He is alert.   Psychiatric:         Mood and Affect: Mood normal.         Behavior: Behavior normal.           ASSESSMENT/PLAN:   1. Non-recurrent unilateral inguinal hernia without obstruction or gangrene  Surgery Referral - [place] ([facility]) - Adult & Peds  US GROIN BILATERAL (CPT=76882-50); Future  Avoid strenuous activities.    Return if symptoms worsen or fail to improve.    This note was prepared using Dragon Medical voice recognition dictation software. As a result  errors may occur. When identified these errors have been corrected. While every attempt is made to correct errors during dictation discrepancies may still exist.    Carl Jordan MD  3/7/2024

## 2024-03-07 NOTE — TELEPHONE ENCOUNTER
Action Requested: Summary for Provider     []  Critical Lab, Recommendations Needed  [] Need Additional Advice  [x]   FYI    []   Need Orders  [] Need Medications Sent to Pharmacy  []  Other     SUMMARY: Appointment with Dr Jordan today. Right groin pain exacerrbated with bearing down and long distance walking.     Reason for call: Right Groin Pain  Onset: 1/15/24 - per patient, it feels similar, but now certain movement make the pain worse       Patient called office. Date of birth and full name both confirmed.   Triaged per protocol and advised care advice per protocol.   Painful when coughing  Painful when bearing down for bowel movement  Painful when walking long distances. Usually walks 2 miles per day - but pain towards the end of the 2 miles.     More details regarding Symptoms under Additional Documentation > Protocols Used.     Evaluation advised today.       Advised to monitor symptoms.  RN advised if symptoms get severely worse, patient should seek care at Emergency Room or Immediate Care.  RN also informed patient to seek immediate medical attention at ER if patient experiences severe/worsening symptoms, shortness of breath, chest pain, or severe pain.  Patient verbalizes understanding and is agreeable to instructions.     Future Appointments   Date Time Provider Department Center   3/7/2024 10:00 AM Carl Jordan MD [facility]   4/2/2024  8:00 AM Stan Martin MD [facility]         Reason for Disposition   MODERATE pain (e.g., interferes with normal activities) that comes and goes (cramps) lasts > 24 hours  (Exception: pain with Vomiting or Diarrhea - see that Protocol)   New-onset hernia suspected (reducible bulge in groin or abdomen; non-tender) and NO pain or vomiting    Protocols used: Abdominal Pain - Male-A-[place], Hernia-A-[place]

## 2024-03-08 ENCOUNTER — HOSPITAL ENCOUNTER (OUTPATIENT)
Dept: ULTRASOUND IMAGING | Facility: HOSPITAL | Age: 69
Discharge: HOME | End: 2024-03-08
Attending: INTERNAL MEDICINE
Payer: MEDICARE

## 2024-03-08 DIAGNOSIS — K40.90 NON-RECURRENT UNILATERAL INGUINAL HERNIA WITHOUT OBSTRUCTION OR GANGRENE: ICD-10-CM

## 2024-03-08 PROCEDURE — 76882 US LMTD JT/FCL EVL NVASC XTR: CPT | Performed by: INTERNAL MEDICINE

## 2024-03-11 ENCOUNTER — TELEPHONE (OUTPATIENT)
Dept: INTERNAL MEDICINE CLINIC | Facility: CLINIC | Age: 69
End: 2024-03-11

## 2024-03-11 NOTE — TELEPHONE ENCOUNTER
Did give the phar the Dx code E66.09 and it is not going thru.  It might need a prior auth, it gave her a message medically not accepted.  Sent for Prior Auth

## 2024-03-11 NOTE — TELEPHONE ENCOUNTER
Completed on covermymeds  Await outcome      Information regarding your request  Available without authorization.    Pharmacy notified

## 2024-03-11 NOTE — TELEPHONE ENCOUNTER
semaglutide (OZEMPIC, 2 MG/DOSE,) 8 MG/3ML Subcutaneous Solution Pen-injector     Pharmacy comments: alternative requested : insurance is requiring diagnosis code to fill

## 2024-03-11 NOTE — TELEPHONE ENCOUNTER
Patient called and said he was just notified by his pharmacy that his  insurance is denying it again,said code might be wrong.

## 2024-03-11 NOTE — TELEPHONE ENCOUNTER
Called patient & informed of the prior approval initiated today around 3:00 pm.  Said he eceived notification from his pharmacy that it was denied.      I informed him that I was not able to see any documentation regarding the denial as of yet.

## 2024-03-11 NOTE — TELEPHONE ENCOUNTER
Paging    Message # [phone]         03/08/2024 12:20p   [[email or web address]]  To:  From:  SIRISHA Fenton MD:  Phone#:  ----------------------------------------------------------------------  [facility] [phone] RE;PT AMARA NICOLE, DOBN 07/25/1955, STAT RESULTS Paged at number :  PAGE: [phone] at :  Mar- 12:20

## 2024-03-12 ENCOUNTER — OFFICE VISIT (OUTPATIENT)
Dept: SURGERY | Facility: CLINIC | Age: 69
End: 2024-03-12
Payer: MEDICARE

## 2024-03-12 VITALS — BODY MASS INDEX: 28.49 KG/M2 | WEIGHT: 199 LBS | HEIGHT: 70 IN

## 2024-03-12 DIAGNOSIS — K40.90 NON-RECURRENT UNILATERAL INGUINAL HERNIA WITHOUT OBSTRUCTION OR GANGRENE: Primary | ICD-10-CM

## 2024-03-12 PROCEDURE — 1160F RVW MEDS BY RX/DR IN RCRD: CPT | Performed by: SURGERY

## 2024-03-12 PROCEDURE — 3008F BODY MASS INDEX DOCD: CPT | Performed by: SURGERY

## 2024-03-12 PROCEDURE — 1159F MED LIST DOCD IN RCRD: CPT | Performed by: SURGERY

## 2024-03-12 PROCEDURE — 99204 OFFICE O/P NEW MOD 45 MIN: CPT | Performed by: SURGERY

## 2024-03-13 PROBLEM — K40.90 NON-RECURRENT UNILATERAL INGUINAL HERNIA WITHOUT OBSTRUCTION OR GANGRENE: Status: ACTIVE | Noted: 2024-03-13

## 2024-03-13 NOTE — H&P (VIEW-ONLY)
HPI:    Patient ID: Anish Simental is a 68 year old male presenting with   Chief Complaint   Patient presents with    Hernia     Referred by Dr. Jordan for sometimes painful, RIH.   .    Hernia        Past Medical History:   Diagnosis Date    Basal cell carcinoma 2017    left lower cheek    Basal cell carcinoma 2017    left lower chest    BCC (basal cell carcinoma of skin)     right alar rim    BCC (basal cell carcinoma of skin)     left vertex scalp    BCC (basal cell carcinoma)     left lateral zygoma    BCC (basal cell carcinoma)     left preauricular cheek    BCC (basal cell carcinoma)     left lateral zygoma    BPH (benign prostatic hyperplasia)     Erectile dysfunction     Essential hypertension     Folliculitis     right lateral vertex - chronic folliculitis    GERD (gastroesophageal reflux disease)     Hearing impairment     both ears    Hypercholesterolemia     Recurrent skin cancer      Past Surgical History:   Procedure Laterality Date    ELBOW SURGERY Right 1971    OTHER  2008    Excision BCCa left temporal scalp    OTHER  2008    Mohs surgery BCCa hairline    OTHER  2016    Excision BCCa upper back    OTHER  2017    Excision BCCa left parietal scalp    OTHER  2017    Excision BCCa left lower cheek and left central chest    OTHER  2019    Cystoscopy, meatotomy, urethral dilatation     Family History   Problem Relation Age of Onset    Heart Disease Father          MI age 77    Other (ALS) Father     Heart Disease Paternal Uncle          MI age 60s    Diabetes Maternal Grandfather     Other (Alzheimers) Mother     Cancer Maternal Grandfather         Details unknown    Other (Glioblastoma) Sister      Social History     Socioeconomic History    Marital status:      Spouse name: Not on file    Number of children: Not on file    Years of education: Not on file    Highest education level: Not on file   Occupational History    Not  on file   Tobacco Use    Smoking status: Former     Packs/day: 1.00     Years: 35.00     Additional pack years: 0.00     Total pack years: 35.00     Types: Cigarettes     Quit date: 2022     Years since quittin.1     Passive exposure: Never    Smokeless tobacco: Never   Vaping Use    Vaping Use: Every day   Substance and Sexual Activity    Alcohol use: Yes     Comment: 2 beers or glasses of wine daily    Drug use: No    Sexual activity: Not Currently     Partners: Female   Other Topics Concern     Service Not Asked    Blood Transfusions Not Asked    Caffeine Concern Yes     Comment: coffee, 1 cup/day    Occupational Exposure Not Asked    Hobby Hazards Not Asked    Sleep Concern Not Asked    Stress Concern Not Asked    Weight Concern Not Asked    Special Diet Not Asked    Back Care Not Asked    Exercise Not Asked    Bike Helmet Not Asked    Seat Belt Not Asked    Self-Exams Not Asked    Grew up on a farm Not Asked    History of tanning Not Asked    Outdoor occupation No    Pt has a pacemaker No    Pt has a defibrillator No    Reaction to local anesthetic No   Social History Narrative    Not on file     Social Determinants of Health     Financial Resource Strain: Not on file   Food Insecurity: Not on file   Transportation Needs: Not on file   Physical Activity: Not on file   Stress: Not on file   Social Connections: Not on file   Housing Stability: Not on file       Review of Systems   Constitutional: Negative.    HENT: Negative.     Eyes: Negative.    Respiratory: Negative.     Cardiovascular: Negative.    Gastrointestinal: Negative.         Right groin bulge and discomfort   Endocrine: Negative.    Genitourinary: Negative.    Musculoskeletal: Negative.    Skin: Negative.    Allergic/Immunologic: Negative.    Neurological: Negative.    Hematological:  Does not bruise/bleed easily.   Psychiatric/Behavioral: Negative.             Current Outpatient Medications   Medication Sig Dispense Refill     semaglutide (OZEMPIC, 2 MG/DOSE,) 8 MG/3ML Subcutaneous Solution Pen-injector Inject 2 mg into the skin once a week. 3 each 5    Lansoprazole 15 MG Oral Capsule Delayed Release Take 1 capsule (15 mg total) by mouth daily. 90 capsule 1    tadalafil 5 MG Oral Tab Take 1 tablet (5 mg total) by mouth daily as needed for Erectile Dysfunction. 90 tablet 3    atorvastatin 10 MG Oral Tab Take 1 tablet (10 mg total) by mouth nightly. (Patient not taking: Reported on 3/7/2024) 90 tablet 1    lisinopril-hydroCHLOROthiazide 10-12.5 MG Oral Tab Take 1 tablet by mouth daily. 90 tablet 1       Allergies:  Allergies   Allergen Reactions    Penicillins FACE FLUSHING      PHYSICAL EXAM:   Ht 5' 10\" (1.778 m)   Wt 199 lb (90.3 kg)   BMI 28.55 kg/m²   Physical Exam  Vitals reviewed.   Constitutional:       Appearance: Normal appearance. He is well-developed.   HENT:      Head: Normocephalic and atraumatic.   Cardiovascular:      Rate and Rhythm: Normal rate and regular rhythm.   Pulmonary:      Effort: Pulmonary effort is normal.      Breath sounds: Normal breath sounds.   Abdominal:      General: There is no distension.      Palpations: Abdomen is soft. There is no mass.      Tenderness: There is no abdominal tenderness. There is no guarding or rebound.      Hernia: A hernia is present. Hernia is present in the right inguinal area.          Comments: There is a lot of soft tissue and redundant skin over the pubis.  Small reducible bulge of the right groin.   Musculoskeletal:         General: Normal range of motion.      Cervical back: Normal range of motion and neck supple.   Skin:     General: Skin is warm and dry.   Neurological:      Mental Status: He is alert and oriented to person, place, and time.   Psychiatric:         Speech: Speech normal.         Behavior: Behavior normal.                 ASSESSMENT/PLAN:   Diagnoses and all orders for this visit:    Non-recurrent unilateral inguinal hernia without obstruction or  gangrene    In light of anticipated difficult open approach due to excessive skin an soft tissue around the pubis, I recommend a laparoscopic approach.  Plan for a da Everardo assisted laparoscopic right inguinal hernia repair with mesh.         Mariano Saeed MD  3/12/2024

## 2024-03-18 NOTE — DISCHARGE INSTRUCTIONS
Home Care Instructions    Da Everardo assisted laparoscopic inguinal hernia repair      1. You have incisions with absorbable sutures underneath the skin so no suture removal are needed.     2. You can shower the day after surgery.  The incision can get wet with water and soap.  Just pat your incision dry after showering.  Avoid soaking in a bath tub for one week.  Avoid heavy lifting (greater than 10 lbs or anything heavier than a gallon of milk) for six weeks after surgery.    3. Be up and around when you are home.  The more you walk, the faster your recovery will be.    4. You may have an abdominal binder (medical girdle).  Wear it when you are up and moving around.  The bottom of the binder should cover a little of your hip bones to provide support to your lower abdomen.  Avoid wearing the binder too high as it may make your discomfort worse.    5. Take pain medications around the clock for the first few days after surgery regardless if you have pain or not.  The pain medications take about 30 minutes to work so if you wait until you experience pain, then you might be uncomfortable during those 30 minutes.    6.  A well known side effect of pain medication is constipation.  It is the number 1, 2, and 3 reason why patients call me after surgery.  Adequate hydration and stool softeners are ways to minimize the risk of constipation after surgery.  Drink a lot of fluid when you are at home.  Check your urine color.  If it's dark, then you are dehydrated and need to drink more water.  Take as many stool softeners (morning, noon, evening) as you need to have about one bowel movement a day.  Don't let four or five days go by without a bowel movement.  If that occurs, then you might need a rectal suppository or an enema to treat the constipation.    7.  You may drive when you are no longer taking prescription pain medications with narcotics.  If your degree of discomfort is minimal, extra strength tylenol alternating with  ibuprofen could be used as necessary.    8. Please contact the office ([phone]) to schedule a telephone visit approximately two weeks after surgery.  At that time, if there are any issues, then we will schedule an in person clinic visit.    9. Signs and symptoms of post-operative problems include abdominal pain associated with nausea and/or vomiting, fever, chills, excessive drainage or pain at the incision sites, leg swelling/pain, or chest pain. Contact our office immediately if these signs or symptoms occur.    10. If you have any problems or questions please contact me at any time day or night.  My cell phone number is [phone].       HOME INSTRUCTIONS  [facility] HOME CARE INSTRUCTIONS: POST-OP ANESTHESIA  The medication that you received for sedation or general anesthesia can last up to 24 hours. Your judgment and reflexes may be altered, even if you feel like your normal self.      We Recommend:   Do not drive any motor vehicle or bicycle   Avoid mowing the lawn, playing sports, or working with power tools/applicances (power saws, electric knives or mixers)   That you have someone stay with you on your first night home   Do not drink alcohol or take sleeping pills or tranquilizers   Do not sign legal documents within 24 hours of your procedure   If you had a nerve block for your surgery, take extra care not to put any pressure on your arm or hand for 24 hours    It is normal:  For you to have a sore throat if you had a breathing tube during surgery (while you were asleep!). The sore throat should get better within 48 hours. You can gargle with warm salt water (1/2 tsp in 4 oz warm water) or use a throat lozenge for comfort  To feel muscle aches or soreness especially in the abdomen, chest or neck. The achy feeling should go away in the next 24 hours  To feel weak, sleepy or \"wiped out\". Your should start feeling better in the next 24 hours.   To experience mild discomforts such as sore lip or tongue,  headache, cramps, gas pains or a bloated feeling in your abdomen.   To experience mild back pain or soreness for a day or two if you had spinal or epidural anesthesia.   If you had laparoscopic surgery, to feel shoulder pain or discomfort on the day of surgery.   For some patients to have nausea after surgery/anesthesia    If you feel nausea or experience vomiting:   Try to move around less.   Eat less than usual or drink only liquids until the next morning   Nausea should resolve in about 24 hours    If you have a problem when you are at home:    Call your surgeons office   Discharge Instructions: After Your Surgery  You’ve just had surgery. During surgery, you were given medicine called anesthesia to keep you relaxed and free of pain. After surgery, you may have some pain or nausea. This is common. Here are some tips for feeling better and getting well after surgery.   Going home  Your healthcare provider will show you how to take care of yourself when you go home. They'll also answer your questions. Have an adult family member or friend drive you home. For the first 24 hours after your surgery:   Don't drive or use heavy equipment.  Don't make important decisions or sign legal papers.  Take medicines as directed.  Don't drink alcohol.  Have someone stay with you, if needed. They can watch for problems and help keep you safe.  Be sure to go to all follow-up visits with your healthcare provider. And rest after your surgery for as long as your provider tells you to.   Coping with pain  If you have pain after surgery, pain medicine will help you feel better. Take it as directed, before pain becomes severe. Also, ask your healthcare provider or pharmacist about other ways to control pain. This might be with heat, ice, or relaxation. And follow any other instructions your surgeon or nurse gives you.      Stay on schedule with your medicine.     Tips for taking pain medicine  To get the best relief possible, remember  these points:   Pain medicines can upset your stomach. Taking them with a little food may help.  Most pain relievers taken by mouth need at least 20 to 30 minutes to start to work.  Don't wait till your pain becomes severe before you take your medicine. Try to time your medicine so that you can take it before starting an activity. This might be before you get dressed, go for a walk, or sit down for dinner.  Constipation is a common side effect of some pain medicines. Call your healthcare provider before taking any medicines such as laxatives or stool softeners to help ease constipation. Also ask if you should skip any foods. Drinking lots of fluids and eating foods such as fruits and vegetables that are high in fiber can also help. Remember, don't take laxatives unless your surgeon has prescribed them.  Drinking alcohol and taking pain medicine can cause dizziness and slow your breathing. It can even be deadly. Don't drink alcohol while taking pain medicine.  Pain medicine can make you react more slowly to things. Don't drive or run machinery while taking pain medicine.  Your healthcare provider may tell you to take acetaminophen to help ease your pain. Ask them how much you're supposed to take each day. Acetaminophen or other pain relievers may interact with your prescription medicines or other over-the-counter (OTC) medicines. Some prescription medicines have acetaminophen and other ingredients in them. Using both prescription and OTC acetaminophen for pain can cause you to accidentally overdose. Read the labels on your OTC medicines with care. This will help you to clearly know the list of ingredients, how much to take, and any warnings. It may also help you not take too much acetaminophen. If you have questions or don't understand the information, ask your pharmacist or healthcare provider to explain it to you before you take the OTC medicine.   Managing nausea  Some people have an upset stomach (nausea) after  surgery. This is often because of anesthesia, pain, or pain medicine, less movement of food in the stomach, or the stress of surgery. These tips will help you handle nausea and eat healthy foods as you get better. If you were on a special food plan before surgery, ask your healthcare provider if you should follow it while you get better. Check with your provider on how your eating should progress. It may depend on the surgery you had. These general tips may help:   Don't push yourself to eat. Your body will tell you when to eat and how much.  Start off with clear liquids and soup. They're easier to digest.  Next try semi-solid foods as you feel ready. These include mashed potatoes, applesauce, and gelatin.  Slowly move to solid foods. Don’t eat fatty, rich, or spicy foods at first.  Don't force yourself to have 3 large meals a day. Instead eat smaller amounts more often.  Take pain medicines with a small amount of solid food, such as crackers or toast. This helps prevent nausea.  When to call your healthcare provider  Call your healthcare provider right away if you have any of these:   You still have too much pain, or the pain gets worse, after taking the medicine. The medicine may not be strong enough. Or there may be a complication from the surgery.  You feel too sleepy, dizzy, or groggy. The medicine may be too strong.  Side effects such as nausea or vomiting. Your healthcare provider may advise taking other medicines to .  Skin changes such as rash, itching, or hives. This may mean you have an allergic reaction. Your provider may advise taking other medicines.  The incision looks different (for instance, part of it opens up).  Bleeding or fluid leaking from the incision site, and weren't told to expect that.  Fever of 100.4°F (38°C) or higher, or as directed by your provider.  Call 911  Call 911 right away if you have:   Trouble breathing  Facial swelling    If you have obstructive sleep apnea   You were given  anesthesia medicine during surgery to keep you comfortable and free of pain. After surgery, you may have more apnea spells because of this medicine and other medicines you were given. The spells may last longer than normal.    At home:  Keep using the continuous positive airway pressure (CPAP) device when you sleep. Unless your healthcare provider tells you not to, use it when you sleep, day or night. CPAP is a common device used to treat obstructive sleep apnea.  Talk with your provider before taking any pain medicine, muscle relaxants, or sedatives. Your provider will tell you about the possible dangers of taking these medicines.  Contact your provider if your sleeping changes a lot even when taking medicines as directed.  [employer] last reviewed this educational content on 10/1/2021  © 9553-2337 [employer]. All rights reserved. This information is not intended as a substitute for professional medical care. Always follow your healthcare professional's instructions.

## 2024-03-19 RX ORDER — LISINOPRIL AND HYDROCHLOROTHIAZIDE 12.5; 1 MG/1; MG/1
1 TABLET ORAL DAILY
Qty: 90 TABLET | Refills: 3 | Status: SHIPPED | OUTPATIENT
Start: 2024-03-19

## 2024-03-19 NOTE — TELEPHONE ENCOUNTER
Refill passed per [facility] protocol.    Requested Prescriptions   Pending Prescriptions Disp Refills    LISINOPRIL-HYDROCHLOROTHIAZIDE 10-12.5 MG Oral Tab [Pharmacy Med Name: LISINOPRIL-HCTZ 10-12.5 MG TAB] 90 tablet 1     Sig: TAKE 1 TABLET BY MOUTH EVERY DAY       Hypertension Medications Protocol Passed - 3/17/2024 12:31 AM        Passed - CMP or BMP in past 12 months        Passed - Last BP reading less than 140/90     BP Readings from Last 1 Encounters:   03/07/24 122/68               Passed - In person appointment or virtual visit in the past 12 mos or appointment in next 3 mos     Recent Outpatient Visits              1 week ago Non-recurrent unilateral inguinal hernia without obstruction or gangrene    [facility] Mariano Saeed MD    Office Visit    1 week ago Non-recurrent unilateral inguinal hernia without obstruction or gangrene    [facility] Carl Jordan MD    Office Visit    2 months ago Right groin pain    [facility] Stan Martin MD    Office Visit    3 months ago ED (erectile dysfunction) of non-organic origin    [facility] Pam Tesfaye MD    Office Visit    5 months ago Essential hypertension    [facility] Stan Martin MD    Office Visit          Future Appointments         Provider Department Appt Notes    In 3 days Kathy Oconnell MD [facility] check few spots of concern    In 2 weeks Stan Martin MD [facility] Last pX 4/3/23 Annual Medicare physical               Passed - EGFRCR or GFRNAA > 50     GFR Evaluation  EGFRCR: 84 , resulted on 4/3/2023

## 2024-03-20 ENCOUNTER — ANESTHESIA (OUTPATIENT)
Dept: SURGERY | Facility: HOSPITAL | Age: 69
End: 2024-03-20
Payer: MEDICARE

## 2024-03-20 ENCOUNTER — HOSPITAL ENCOUNTER (OUTPATIENT)
Facility: HOSPITAL | Age: 69
Setting detail: HOSPITAL OUTPATIENT SURGERY
Discharge: HOME | End: 2024-03-20
Attending: SURGERY | Admitting: SURGERY
Payer: MEDICARE

## 2024-03-20 ENCOUNTER — ANESTHESIA EVENT (OUTPATIENT)
Dept: SURGERY | Facility: HOSPITAL | Age: 69
End: 2024-03-20
Payer: MEDICARE

## 2024-03-20 VITALS
TEMPERATURE: 98 F | SYSTOLIC BLOOD PRESSURE: 123 MMHG | DIASTOLIC BLOOD PRESSURE: 72 MMHG | HEART RATE: 73 BPM | HEIGHT: 70 IN | RESPIRATION RATE: 18 BRPM | WEIGHT: 199 LBS | OXYGEN SATURATION: 97 % | BODY MASS INDEX: 28.49 KG/M2

## 2024-03-20 DIAGNOSIS — K40.90 NON-RECURRENT UNILATERAL INGUINAL HERNIA WITHOUT OBSTRUCTION OR GANGRENE: Primary | ICD-10-CM

## 2024-03-20 PROCEDURE — 0YU54JZ SUPPLEMENT RIGHT INGUINAL REGION WITH SYNTHETIC SUBSTITUTE, PERCUTANEOUS ENDOSCOPIC APPROACH: ICD-10-PCS | Performed by: SURGERY

## 2024-03-20 PROCEDURE — 8E0W4CZ ROBOTIC ASSISTED PROCEDURE OF TRUNK REGION, PERCUTANEOUS ENDOSCOPIC APPROACH: ICD-10-PCS | Performed by: SURGERY

## 2024-03-20 DEVICE — IMPLANTABLE DEVICE: Type: IMPLANTABLE DEVICE | Site: INGUINAL | Status: FUNCTIONAL

## 2024-03-20 RX ORDER — HYDROMORPHONE HYDROCHLORIDE 1 MG/ML
0.6 INJECTION, SOLUTION INTRAMUSCULAR; INTRAVENOUS; SUBCUTANEOUS EVERY 5 MIN PRN
Status: DISCONTINUED | OUTPATIENT
Start: 2024-03-20 | End: 2024-03-20

## 2024-03-20 RX ORDER — NEOSTIGMINE METHYLSULFATE 1 MG/ML
INJECTION, SOLUTION INTRAVENOUS AS NEEDED
Status: DISCONTINUED | OUTPATIENT
Start: 2024-03-20 | End: 2024-03-20 | Stop reason: SURG

## 2024-03-20 RX ORDER — POLYETHYLENE GLYCOL 3350 17 G/17G
17 POWDER, FOR SOLUTION ORAL DAILY
Qty: 14 PACKET | Refills: 0 | Status: SHIPPED | OUTPATIENT
Start: 2024-03-20 | End: 2024-04-03

## 2024-03-20 RX ORDER — BUPIVACAINE HYDROCHLORIDE 5 MG/ML
INJECTION, SOLUTION EPIDURAL; INTRACAUDAL AS NEEDED
Status: DISCONTINUED | OUTPATIENT
Start: 2024-03-20 | End: 2024-03-20 | Stop reason: HOSPADM

## 2024-03-20 RX ORDER — HYDROMORPHONE HYDROCHLORIDE 1 MG/ML
0.2 INJECTION, SOLUTION INTRAMUSCULAR; INTRAVENOUS; SUBCUTANEOUS EVERY 5 MIN PRN
Status: DISCONTINUED | OUTPATIENT
Start: 2024-03-20 | End: 2024-03-20

## 2024-03-20 RX ORDER — HYDROMORPHONE HYDROCHLORIDE 1 MG/ML
0.4 INJECTION, SOLUTION INTRAMUSCULAR; INTRAVENOUS; SUBCUTANEOUS EVERY 5 MIN PRN
Status: DISCONTINUED | OUTPATIENT
Start: 2024-03-20 | End: 2024-03-20

## 2024-03-20 RX ORDER — DEXAMETHASONE SODIUM PHOSPHATE 4 MG/ML
VIAL (ML) INJECTION AS NEEDED
Status: DISCONTINUED | OUTPATIENT
Start: 2024-03-20 | End: 2024-03-20 | Stop reason: SURG

## 2024-03-20 RX ORDER — NALOXONE HYDROCHLORIDE 0.4 MG/ML
0.08 INJECTION, SOLUTION INTRAMUSCULAR; INTRAVENOUS; SUBCUTANEOUS AS NEEDED
Status: DISCONTINUED | OUTPATIENT
Start: 2024-03-20 | End: 2024-03-20

## 2024-03-20 RX ORDER — SODIUM CHLORIDE, SODIUM LACTATE, POTASSIUM CHLORIDE, CALCIUM CHLORIDE 600; 310; 30; 20 MG/100ML; MG/100ML; MG/100ML; MG/100ML
INJECTION, SOLUTION INTRAVENOUS CONTINUOUS
Status: DISCONTINUED | OUTPATIENT
Start: 2024-03-20 | End: 2024-03-20

## 2024-03-20 RX ORDER — CEFAZOLIN SODIUM/WATER 2 G/20 ML
2 SYRINGE (ML) INTRAVENOUS ONCE
Status: COMPLETED | OUTPATIENT
Start: 2024-03-20 | End: 2024-03-20

## 2024-03-20 RX ORDER — HYDROCODONE BITARTRATE AND ACETAMINOPHEN 5; 325 MG/1; MG/1
1 TABLET ORAL EVERY 6 HOURS PRN
Qty: 30 TABLET | Refills: 0 | Status: SHIPPED | OUTPATIENT
Start: 2024-03-20

## 2024-03-20 RX ORDER — GLYCOPYRROLATE 0.2 MG/ML
INJECTION, SOLUTION INTRAMUSCULAR; INTRAVENOUS AS NEEDED
Status: DISCONTINUED | OUTPATIENT
Start: 2024-03-20 | End: 2024-03-20 | Stop reason: SURG

## 2024-03-20 RX ORDER — ONDANSETRON 2 MG/ML
4 INJECTION INTRAMUSCULAR; INTRAVENOUS EVERY 6 HOURS PRN
Status: DISCONTINUED | OUTPATIENT
Start: 2024-03-20 | End: 2024-03-20

## 2024-03-20 RX ORDER — ACETAMINOPHEN 500 MG
1000 TABLET ORAL ONCE
Status: COMPLETED | OUTPATIENT
Start: 2024-03-20 | End: 2024-03-20

## 2024-03-20 RX ORDER — METOCLOPRAMIDE HYDROCHLORIDE 5 MG/ML
10 INJECTION INTRAMUSCULAR; INTRAVENOUS EVERY 8 HOURS PRN
Status: DISCONTINUED | OUTPATIENT
Start: 2024-03-20 | End: 2024-03-20

## 2024-03-20 RX ORDER — MORPHINE SULFATE 4 MG/ML
2 INJECTION, SOLUTION INTRAMUSCULAR; INTRAVENOUS EVERY 10 MIN PRN
Status: DISCONTINUED | OUTPATIENT
Start: 2024-03-20 | End: 2024-03-20

## 2024-03-20 RX ORDER — MORPHINE SULFATE 10 MG/ML
6 INJECTION, SOLUTION INTRAMUSCULAR; INTRAVENOUS EVERY 10 MIN PRN
Status: DISCONTINUED | OUTPATIENT
Start: 2024-03-20 | End: 2024-03-20

## 2024-03-20 RX ORDER — MORPHINE SULFATE 4 MG/ML
4 INJECTION, SOLUTION INTRAMUSCULAR; INTRAVENOUS EVERY 10 MIN PRN
Status: DISCONTINUED | OUTPATIENT
Start: 2024-03-20 | End: 2024-03-20

## 2024-03-20 RX ORDER — ONDANSETRON 2 MG/ML
INJECTION INTRAMUSCULAR; INTRAVENOUS AS NEEDED
Status: DISCONTINUED | OUTPATIENT
Start: 2024-03-20 | End: 2024-03-20 | Stop reason: SURG

## 2024-03-20 RX ORDER — ROCURONIUM BROMIDE 10 MG/ML
INJECTION, SOLUTION INTRAVENOUS AS NEEDED
Status: DISCONTINUED | OUTPATIENT
Start: 2024-03-20 | End: 2024-03-20 | Stop reason: SURG

## 2024-03-20 RX ADMIN — CEFAZOLIN SODIUM/WATER 2 G: 2 G/20 ML SYRINGE (ML) INTRAVENOUS at 12:22:00

## 2024-03-20 RX ADMIN — ONDANSETRON 4 MG: 2 INJECTION INTRAMUSCULAR; INTRAVENOUS at 13:52:00

## 2024-03-20 RX ADMIN — SODIUM CHLORIDE, SODIUM LACTATE, POTASSIUM CHLORIDE, CALCIUM CHLORIDE: 600; 310; 30; 20 INJECTION, SOLUTION INTRAVENOUS at 12:23:00

## 2024-03-20 RX ADMIN — CEFAZOLIN SODIUM/WATER 2 G: 2 G/20 ML SYRINGE (ML) INTRAVENOUS at 12:32:00

## 2024-03-20 RX ADMIN — NEOSTIGMINE METHYLSULFATE 5 MG: 1 INJECTION, SOLUTION INTRAVENOUS at 13:52:00

## 2024-03-20 RX ADMIN — DEXAMETHASONE SODIUM PHOSPHATE 4 MG: 4 MG/ML VIAL (ML) INJECTION at 12:33:00

## 2024-03-20 RX ADMIN — GLYCOPYRROLATE 0.8 MG: 0.2 INJECTION, SOLUTION INTRAMUSCULAR; INTRAVENOUS at 13:52:00

## 2024-03-20 RX ADMIN — ROCURONIUM BROMIDE 40 MG: 10 INJECTION, SOLUTION INTRAVENOUS at 12:23:00

## 2024-03-20 NOTE — ANESTHESIA PROCEDURE NOTES
Airway  Date/Time: 3/20/2024 12:27 PM  Urgency: Elective    Airway not difficult    General Information and Staff    Patient location during procedure: OR  Anesthesiologist: Dyan King MD  Performed: anesthesiologist   Performed by: Dyan King MD  Authorized by: Dyan King MD      Indications and Patient Condition  Indications for airway management: anesthesia  Sedation level: deep  Preoxygenated: yes  Patient position: sniffing  Mask difficulty assessment: 1 - vent by mask    Final Airway Details  Final airway type: endotracheal airway      Successful airway: ETT  Cuffed: yes   Successful intubation technique: direct laryngoscopy  Endotracheal tube insertion site: oral  Blade: Alek  Blade size: #3  ETT size (mm): 7.5    Cormack-Lehane Classification: grade I - full view of glottis  Placement verified by: capnometry   Measured from: teeth  ETT to teeth (cm): 23  Number of attempts at approach: 1       : Yes

## 2024-03-20 NOTE — ANESTHESIA POSTPROCEDURE EVALUATION
Patient: Anish Simental    Procedure Summary       Date: 03/20/24 Room / Location: [facility] MAIN OR 07 / [facility] MAIN OR    Anesthesia Start: 1222 Anesthesia Stop: 1406    Procedure: XI robotic-assisted laparoscopic right inguinal hernia repair with mesh (Right: Abdomen) Diagnosis:       Non-recurrent unilateral inguinal hernia without obstruction or gangrene      (Non-recurrent unilateral inguinal hernia without obstruction or gangrene [K40.90])    Surgeons: Mariano Saeed MD Anesthesiologist: Dyna King MD    Anesthesia Type: general ASA Status: 2            Anesthesia Type: general    Vitals Value Taken Time   /70 03/20/24 1406   Temp 98.1 °F (36.7 °C) 03/20/24 1406   Pulse 72 03/20/24 1406   Resp 13 03/20/24 1406   SpO2 96 % 03/20/24 1406   Vitals shown include unfiled device data.    [facility] AN Post Evaluation:   Patient Evaluated in PACU  Patient Participation: complete - patient participated  Level of Consciousness: awake  Pain Score: 0  Pain Management: adequate  Airway Patency:patent  Dental exam unchanged from preop  Yes    Cardiovascular Status: acceptable  Respiratory Status: acceptable  Postoperative Hydration acceptable      Dyan King MD  3/20/2024 2:07 PM

## 2024-03-20 NOTE — OPERATIVE REPORT
[facility]  part of [facility]     Operative Report    Patient Name:  Anish Simental  MR:  [id number]  :  1955  DOS:  24    Preop Dx:  Non-recurrent unilateral inguinal hernia without obstruction or gangrene [K40.90]  Postop Dx:  Non-recurrent unilateral inguinal hernia without obstruction or gangrene [K40.90]  Procedure:  XI robotic-assisted laparoscopic right inguinal hernia repair with mesh  Surgeon:  Mariano Saeed MD  Surgical Assistant.: Claudy Rowe CSA,   EBL: 10 ml  Complication:  None    INDICATION:  Pt is a 68 year old male who with a symptomatic Non-recurrent unilateral inguinal hernia without obstruction or gangrene [K40.90] who is scheduled for a XI robotic-assisted laparoscopic right inguinal hernia repair with mesh    CONSENT:  An informed consent discussion was held with the patient regarding the nature of inguinal hernias, the treatment options, expected outcomes, risks and complications.  These risks include but are not limited to bleeding, wound infection, mesh infection, bowel injury, injury to the spermatic cord structures including the vas deferens, chronic groin pain due to nerve injury or entrapment, and hernia recurrence.  The patient expressed understanding and agreed to proceed with the planned procedure.      TECHNIQUE:  The patient was taken to the operating room and placed in supine position.  General anesthesia was administered and endotracheal intubation was performed by the anesthesia service.  IV antibiotic was given and the abdomen was prepped and draped in standard fashion.  An Ioban drape was placed on the lower abdomen.  A time out was performed according to hospital protocol.      Pneumoperitoneum was established via Veress needle technique through a supraumbilical incision.  A solution of 0.5% Marcaine was used to infiltrate the skin and subcutaneous tissue prior to incision.  An 8-mm trocar was inserted via direct visualization and  no trocar insertion injury was seen.  An 8-mm trocar was placed in the left lower quadrant and an 8-mm trocar was placed in the right lower quadrant.      The XI da Everardo robotic system was docked without issues.  The patient was then placed in Trendelenburg.  Examination of the pelvis showed:  a large right indirect inguinal hernia.  The hernia content was reduced with gentle traction.  The peritoneum above the internal ring was incised using cautery.  The incision was carried laterally to the anterior axillary line and medially to the median umbilical ligament.  Superior and inferior flaps were created by blunt dissection of the peritoneum.  The spermatic cord was identified and the hernia sac was reduced by blunt dissection.  Care was taken to identify the vas deferens and protect this structure throughout the dissection. The pubic tubercle, Ananth's ligament, and the transversus abdominis aponeurosis were also identified.  Then a 15 x 10 cm Progrip mesh was introduced into the abdomen to cover the internal ring.  The self gripping mechanism of the mesh was deployed and appeared to be secure.  The peritoneum was then closed using 3-0 v loc.      The da Everardo system was undocked.  The ports were withdrawn under direct visualization and no port site bleeding was seen.  The abdomen was hemostatic and evacuated of carbon dioxide.  The skin was reapproximated using 4-0 vicryl.  Sterile dressings were applied to the wounds.    The patient was stable throughout the procedure.  All instrument and sponge counts were correct at the end of the case.  I was present during the critical portions of the procedure.    Mariano Saeed MD

## 2024-03-20 NOTE — INTERVAL H&P NOTE
Pre-op Diagnosis: Non-recurrent unilateral inguinal hernia without obstruction or gangrene [K40.90]    The above referenced H&P was reviewed by Mariano Saeed MD on 3/20/2024, the patient was examined and no significant changes have occurred in the patient's condition since the H&P was performed.  I discussed with the patient and/or legal representative the potential benefits, risks and side effects of this procedure; the likelihood of the patient achieving goals; and potential problems that might occur during recuperation.  I discussed reasonable alternatives to the procedure, including risks, benefits and side effects related to the alternatives and risks related to not receiving this procedure.  We will proceed with procedure as planned.

## 2024-03-20 NOTE — ANESTHESIA PROCEDURE NOTES
Peripheral IV  Date/Time: 3/20/2024 12:43 PM  Inserted by: Dyan King MD    Placement  Needle size: 20 G  Laterality: left  Location: hand  Local anesthetic: none  Site prep: alcohol  Technique: anatomical landmarks  Attempts: 1

## 2024-03-20 NOTE — ANESTHESIA PREPROCEDURE EVALUATION
Anesthesia PreOp Note    HPI:     Anish Simental is a 68 year old male who presents for preoperative consultation requested by: Mariano Saeed MD    Date of Surgery: 3/20/2024    Procedure(s):  XI robotic-assisted laparoscopic right inguinal hernia repair with mesh  Indication: Non-recurrent unilateral inguinal hernia without obstruction or gangrene [K40.90]    Relevant Problems   No relevant active problems       NPO:  Last Liquid Consumption Date: 03/19/24  Last Liquid Consumption Time: 2000  Last Solid Consumption Date: 03/19/24  Last Solid Consumption Time: 2000  Last Liquid Consumption Date: 03/19/24          History Review:  Patient Active Problem List    Diagnosis Date Noted    Non-recurrent unilateral inguinal hernia without obstruction or gangrene 03/13/2024    BCC (basal cell carcinoma of skin)     GERD (gastroesophageal reflux disease) 07/21/2015    Hypertension 12/19/2014    Hypercholesterolemia 12/19/2014    BPH (benign prostatic hyperplasia) 12/19/2014    Recurrent skin cancer 12/19/2014    Erectile dysfunction 12/19/2014    Tobacco use 12/19/2014       Past Medical History:   Diagnosis Date    Basal cell carcinoma 2017    left lower cheek    Basal cell carcinoma 2017    left lower chest    BCC (basal cell carcinoma of skin) 2017    right alar rim    BCC (basal cell carcinoma of skin) 2017    left vertex scalp    BCC (basal cell carcinoma) 2022    left lateral zygoma    BCC (basal cell carcinoma) 2022    left preauricular cheek    BCC (basal cell carcinoma) 2023    left lateral zygoma    BPH (benign prostatic hyperplasia)     Erectile dysfunction     Essential hypertension     Folliculitis 2020    right lateral vertex - chronic folliculitis    GERD (gastroesophageal reflux disease)     Hearing impairment     both ears    High blood pressure     Hypercholesterolemia     Recurrent skin cancer        Past Surgical History:   Procedure Laterality Date    ELBOW SURGERY Right 1971 OTHER September      Excision BCCa left temporal scalp    OTHER  2008    Mohs surgery BCCa hairline    OTHER  2016    Excision BCCa upper back    OTHER  2017    Excision BCCa left parietal scalp    OTHER  2017    Excision BCCa left lower cheek and left central chest    OTHER  2019    Cystoscopy, meatotomy, urethral dilatation       Medications Prior to Admission   Medication Sig Dispense Refill Last Dose    lisinopril-hydroCHLOROthiazide 10-12.5 MG Oral Tab Take 1 tablet by mouth daily. 90 tablet 3 3/20/2024 at 0600    Lansoprazole 15 MG Oral Capsule Delayed Release Take 1 capsule (15 mg total) by mouth daily. 90 capsule 1 3/18/2024    tadalafil 5 MG Oral Tab Take 1 tablet (5 mg total) by mouth daily as needed for Erectile Dysfunction. 90 tablet 3 3/16/2024     Current Facility-Administered Medications Ordered in Epic   Medication Dose Route Frequency Provider Last Rate Last Admin    lactated ringers infusion   Intravenous Continuous Mariano Saeed MD 20 mL/hr at 24 1212 New Bag at 24 1212    ceFAZolin (Ancef) 2 g in 20mL IV syringe premix  2 g Intravenous Once Mariano Saeed MD         No current [facility]-ordered outpatient medications on file.       Allergies   Allergen Reactions    Penicillins FACE FLUSHING     No skin peeling or blisters.        Family History   Problem Relation Age of Onset    Heart Disease Father          MI age 77    Other (ALS) Father     Heart Disease Paternal Uncle          MI age 60s    Diabetes Maternal Grandfather     Other (Alzheimers) Mother     Cancer Maternal Grandfather         Details unknown    Other (Glioblastoma) Sister      Social History     Socioeconomic History    Marital status:    Tobacco Use    Smoking status: Former     Packs/day: 1.00     Years: 35.00     Additional pack years: 0.00     Total pack years: 35.00     Types: Cigarettes     Quit date: 2022     Years since quittin.1     Passive exposure: Never    Smokeless tobacco: Never    Vaping Use    Vaping Use: Every day   Substance and Sexual Activity    Alcohol use: Yes     Comment: 2 beers or glasses of wine daily    Drug use: No    Sexual activity: Not Currently     Partners: Female   Other Topics Concern    Caffeine Concern Yes     Comment: coffee, 1 cup/day    Outdoor occupation No    Pt has a pacemaker No    Pt has a defibrillator No    Reaction to local anesthetic No       Available pre-op labs reviewed.             Vital Signs:  Body mass index is 28.55 kg/m².   height is 1.778 m (5' 10\") and weight is 90.3 kg (199 lb). His oral temperature is 98.6 °F (37 °C). His blood pressure is 148/81 and his pulse is 71. His respiration is 18 and oxygen saturation is 100%.   Vitals:    03/16/24 0905 03/20/24 1208   BP:  148/81   Pulse:  71   Resp:  18   Temp:  98.6 °F (37 °C)   TempSrc:  Oral   SpO2:  100%   Weight: 88.5 kg (195 lb) 90.3 kg (199 lb)   Height: 1.778 m (5' 10\") 1.778 m (5' 10\")        Anesthesia Evaluation      Airway   Mallampati: II  TM distance: <3 FB  Neck ROM: full  Dental      Pulmonary - normal exam   Cardiovascular - normal exam  (+) hypertension    Neuro/Psych      GI/Hepatic/Renal    (+) GERD    Endo/Other    Abdominal                  Anesthesia Plan:   ASA:  2  Plan:   General  Monitors and Lines:   Additonal IV  Airway:  ETT  Post-op Pain Management: IV analgesics  Informed Consent Plan and Risks Discussed With:  Patient  Discussed plan with:  Surgeon      I have informed Anish Simental and/or legal guardian or family member of the nature of the anesthetic plan, benefits, risks including possible dental damage if relevant, major complications, and any alternative forms of anesthetic management.   All of the patient's questions were answered to the best of my ability. The patient desires the anesthetic management as planned.  Dyan King MD  3/20/2024 12:21 PM  Present on Admission:  **None**

## 2024-03-22 ENCOUNTER — OFFICE VISIT (OUTPATIENT)
Dept: DERMATOLOGY CLINIC | Facility: CLINIC | Age: 69
End: 2024-03-22
Payer: MEDICARE

## 2024-03-22 DIAGNOSIS — L82.1 SEBORRHEIC KERATOSES: ICD-10-CM

## 2024-03-22 DIAGNOSIS — L30.9 DERMATITIS: ICD-10-CM

## 2024-03-22 DIAGNOSIS — D23.9 BENIGN NEOPLASM OF SKIN, UNSPECIFIED LOCATION: ICD-10-CM

## 2024-03-22 DIAGNOSIS — Z85.828 ENCOUNTER FOR FOLLOW-UP SURVEILLANCE OF SKIN CANCER: ICD-10-CM

## 2024-03-22 DIAGNOSIS — L72.0 EPIDERMAL CYST: Primary | ICD-10-CM

## 2024-03-22 DIAGNOSIS — Z08 ENCOUNTER FOR FOLLOW-UP SURVEILLANCE OF SKIN CANCER: ICD-10-CM

## 2024-03-22 DIAGNOSIS — L81.4 LENTIGO: ICD-10-CM

## 2024-03-22 DIAGNOSIS — Z85.828 HISTORY OF NONMELANOMA SKIN CANCER: ICD-10-CM

## 2024-03-22 DIAGNOSIS — L57.0 AK (ACTINIC KERATOSIS): ICD-10-CM

## 2024-03-22 PROCEDURE — 17003 DESTRUCT PREMALG LES 2-14: CPT | Performed by: DERMATOLOGY

## 2024-03-22 PROCEDURE — 1160F RVW MEDS BY RX/DR IN RCRD: CPT | Performed by: DERMATOLOGY

## 2024-03-22 PROCEDURE — 1159F MED LIST DOCD IN RCRD: CPT | Performed by: DERMATOLOGY

## 2024-03-22 PROCEDURE — 99213 OFFICE O/P EST LOW 20 MIN: CPT | Performed by: DERMATOLOGY

## 2024-03-22 PROCEDURE — 17000 DESTRUCT PREMALG LESION: CPT | Performed by: DERMATOLOGY

## 2024-04-01 NOTE — PROGRESS NOTES
Anish Simental is a 68 year old male.  HPI:     CC:    Chief Complaint   Patient presents with    Derm Problem     LOV 23. Pt presents for spot next to left ear that previously had MOHS surgery on . Sometimes painful, getting bigger. Been happening since the surgery. No treatment.   Also spot of concern on left/right cheek. No S&S.  Pt has hx of BCC         Allergies:  Penicillins    HISTORY:    Past Medical History:   Diagnosis Date    Basal cell carcinoma 2017    left lower cheek    Basal cell carcinoma 2017    left lower chest    BCC (basal cell carcinoma of skin)     right alar rim    BCC (basal cell carcinoma of skin)     left vertex scalp    BCC (basal cell carcinoma)     left lateral zygoma    BCC (basal cell carcinoma)     left preauricular cheek    BCC (basal cell carcinoma)     left lateral zygoma    BPH (benign prostatic hyperplasia)     Erectile dysfunction     Essential hypertension     Folliculitis     right lateral vertex - chronic folliculitis    GERD (gastroesophageal reflux disease)     Hearing impairment     both ears    High blood pressure     Hypercholesterolemia     Recurrent skin cancer       Past Surgical History:   Procedure Laterality Date    ELBOW SURGERY Right 1971    HERNIA SURGERY      OTHER  2008    Excision BCCa left temporal scalp    OTHER  2008    Mohs surgery BCCa hairline    OTHER  2016    Excision BCCa upper back    OTHER  2017    Excision BCCa left parietal scalp    OTHER  2017    Excision BCCa left lower cheek and left central chest    OTHER  2019    Cystoscopy, meatotomy, urethral dilatation      Family History   Problem Relation Age of Onset    Heart Disease Father          MI age 77    Other (ALS) Father     Heart Disease Paternal Uncle          MI age 60s    Diabetes Maternal Grandfather     Other (Alzheimers) Mother     Cancer Maternal Grandfather         Details unknown    Other (Glioblastoma) Sister        Social History     Socioeconomic History    Marital status:    Tobacco Use    Smoking status: Former     Packs/day: 1.00     Years: 35.00     Additional pack years: 0.00     Total pack years: 35.00     Types: Cigarettes     Quit date: 2022     Years since quittin.2     Passive exposure: Never    Smokeless tobacco: Never   Vaping Use    Vaping Use: Every day   Substance and Sexual Activity    Alcohol use: Yes     Comment: 2 beers or glasses of wine daily    Drug use: No    Sexual activity: Not Currently     Partners: Female   Other Topics Concern    Caffeine Concern Yes     Comment: coffee, 1 cup/day    Outdoor occupation No    Pt has a pacemaker No    Pt has a defibrillator No    Reaction to local anesthetic No        Current Outpatient Medications   Medication Sig Dispense Refill    HYDROcodone-acetaminophen 5-325 MG Oral Tab Take 1 tablet by mouth every 6 (six) hours as needed for Pain. 30 tablet 0    Polyethylene Glycol 3350 (MIRALAX) 17 g Oral Powd Pack Take 17 g by mouth daily for 14 days. 14 packet 0    lisinopril-hydroCHLOROthiazide 10-12.5 MG Oral Tab Take 1 tablet by mouth daily. 90 tablet 3    Lansoprazole 15 MG Oral Capsule Delayed Release Take 1 capsule (15 mg total) by mouth daily. 90 capsule 1    tadalafil 5 MG Oral Tab Take 1 tablet (5 mg total) by mouth daily as needed for Erectile Dysfunction. 90 tablet 3     Allergies:   Allergies   Allergen Reactions    Penicillins FACE FLUSHING     No skin peeling or blisters.        Past Medical History:   Diagnosis Date    Basal cell carcinoma 2017    left lower cheek    Basal cell carcinoma 2017    left lower chest    BCC (basal cell carcinoma of skin)     right alar rim    BCC (basal cell carcinoma of skin)     left vertex scalp    BCC (basal cell carcinoma)     left lateral zygoma    BCC (basal cell carcinoma)     left preauricular cheek    BCC (basal cell carcinoma)     left lateral zygoma    BPH (benign prostatic  hyperplasia)     Erectile dysfunction     Essential hypertension     Folliculitis     right lateral vertex - chronic folliculitis    GERD (gastroesophageal reflux disease)     Hearing impairment     both ears    High blood pressure     Hypercholesterolemia     Recurrent skin cancer      Past Surgical History:   Procedure Laterality Date    ELBOW SURGERY Right 1971    HERNIA SURGERY      OTHER  2008    Excision BCCa left temporal scalp    OTHER  2008    Mohs surgery BCCa hairline    OTHER  2016    Excision BCCa upper back    OTHER  2017    Excision BCCa left parietal scalp    OTHER  2017    Excision BCCa left lower cheek and left central chest    OTHER  2019    Cystoscopy, meatotomy, urethral dilatation     Social History     Socioeconomic History    Marital status:      Spouse name: Not on file    Number of children: Not on file    Years of education: Not on file    Highest education level: Not on file   Occupational History    Not on file   Tobacco Use    Smoking status: Former     Packs/day: 1.00     Years: 35.00     Additional pack years: 0.00     Total pack years: 35.00     Types: Cigarettes     Quit date: 2022     Years since quittin.2     Passive exposure: Never    Smokeless tobacco: Never   Vaping Use    Vaping Use: Every day   Substance and Sexual Activity    Alcohol use: Yes     Comment: 2 beers or glasses of wine daily    Drug use: No    Sexual activity: Not Currently     Partners: Female   Other Topics Concern     Service Not Asked    Blood Transfusions Not Asked    Caffeine Concern Yes     Comment: coffee, 1 cup/day    Occupational Exposure Not Asked    Hobby Hazards Not Asked    Sleep Concern Not Asked    Stress Concern Not Asked    Weight Concern Not Asked    Special Diet Not Asked    Back Care Not Asked    Exercise Not Asked    Bike Helmet Not Asked    Seat Belt Not Asked    Self-Exams Not Asked    Grew up on a farm Not Asked    History of tanning Not  Asked    Outdoor occupation No    Pt has a pacemaker No    Pt has a defibrillator No    Reaction to local anesthetic No   Social History Narrative    Not on file     Social Determinants of Health     Financial Resource Strain: Not on file   Food Insecurity: Not on file   Transportation Needs: Not on file   Physical Activity: Not on file   Stress: Not on file   Social Connections: Not on file   Housing Stability: Not on file     Family History   Problem Relation Age of Onset    Heart Disease Father          MI age 77    Other (ALS) Father     Heart Disease Paternal Uncle          MI age 60s    Diabetes Maternal Grandfather     Other (Alzheimers) Mother     Cancer Maternal Grandfather         Details unknown    Other (Glioblastoma) Sister        There were no vitals filed for this visit.    HPI:    Chief Complaint   Patient presents with    Derm Problem     LOV 23. Pt presents for spot next to left ear that previously had MOHS surgery on . Sometimes painful, getting bigger. Been happening since the surgery. No treatment.   Also spot of concern on left/right cheek. No S&S.  Pt has hx of BCC     Follow-up patient with history BCC, AK's, history of imiquimod, ENC for above lesions.     chest recent Mohs for BCC left zygoma, left preauricular cheek Dr. Almonte   generally careful with sunscreen     no recent surgeries.  Has not noted any other lesions of concern      Note scaly area on back  Patient presents with concerns above.    Past notes/ records and appropriate/relevant lab results including pathology and past body maps reviewed. Updated and new information noted in current visit.     Patient has been in their usual state of health.  History, medications, allergies reviewed as noted.      ROS:  Denies any other systemic complaints.  No new or changeing lesions other than noted above. No fevers, chills, night sweats, unusual sun sensitivity.  No other skin complaints.        History, medications,  allergies reviewed as noted.       Physical Examination:     Well-developed well-nourished patient alert oriented in no acute distress.  Exam total-body performed, including scalp, head, neck, face,nails, hair, external eyes, including conjunctival mucosa, eyelids, lips external ears, back, chest,/ breasts, axillae,  abdomen, arms, legs, palms.     Multiple light to medium brown, well marginated, uniformly pigmented, macules and papules 6 mm and less scattered on exam. pigmented lesions examined with dermoscopy benign-appearing patterns.     Waxy tannish keratotic papules scattered, cherry-red vascular papules scattered.    See map today's date for lesions noted .      Otherwise remarkable for lesions as noted on map.  See details of examination  See Assessment /Plan for additional history and physical exam also:    Assessment / plan:    No orders of the defined types were placed in this encounter.      Meds & Refills for this Visit:  Requested Prescriptions      No prescriptions requested or ordered in this encounter         Encounter Diagnoses   Name Primary?    Epidermal cyst Yes    AK (actinic keratosis)     Seborrheic keratoses     Benign neoplasm of skin, unspecified location     Lentigo     History of nonmelanoma skin cancer     Dermatitis     Encounter for follow-up surveillance of skin cancer        See details on map.      Remarkable for:  Cystic lesion of left preauricular cheek at incision.  Pulling on tragus tender aggravating irritated uncomfortable has been increasing in size.    No evidence of recurrence of squamous of carcinoma.  Encourage patient follow-up with ENT for excision of cystic lesion.    Erythematous scaling keratotic papules noted at sites noted on map  Actinic Keratoses.  Precancerous nature discussed. Sun protection, sunscreen/ blocks encouraged Lesions treated with cryo- .  Biopsy if not resolved.    Mid cheeks2  x2    Complains of itching irritation at right scapula  Dermatitis.   Meds in grid.  Skin care instructions reviewed.  Cumby use of emollients.  Pathophysiology reviewed.  Consider Contac allergy in differential.  Consider patch testing.  Patient will let us know how they are doing over the next several weeks.  Await clinical response to above therapies.  Over-the-counter hydrocortisone      Multiple skin cancers, extensive AK's in the past.  Post Efudex doing better.  Consider repeat course.  Left lateral zygoma, left preauricular cheek BCC post Mohs Dr. Almonte2022  Repeat Mohs left lateral zygoma 2023.  Multiple basal cell carcinomas no recurrence.    Continue careful monitoring no other suspicious lesions no other significant changes in exam.  Encouraged more consistent use of sunscreen while driving given new lesions on the left side      Continue careful sun protection right lateral vertex biopsy benign folliculitis last office visit 6/20/2020    Other areas of prior skin cancer at the scalp clear pinkish patches consistent with noted.  Slightly erythematous anterior portion of central vertex.  Continue careful monitoring.      Postauricular neck probable BCC resolved with Aldara   chest clear.  No recurrence prior BCC.  No other new suspicious lesions  Pinkish patch upper back observe. Possible aldara    Multiple benign keratoses.     Moderate sun damage no other suspicious lesions  Other benign nevi lentigines    Continue careful monitoring patient at high risk for additional skin cancers recheck in 3 to 4 months.    Please refer to map for specific lesions.  See additional diagnoses.  Pros cons of various therapies, risks benefits discussed.Pathophysiology discussed with patient.  Therapeutic options reviewed.  See  Medications in grid.  Instructions reviewed at length.    Benign nevi, seborrheic  keratoses, cherry angiomas:  Reassurance regarding other benign skin lesions.Signs and symptoms of skin cancer, ABCDE's of melanoma discussed with patient. Sunscreen use, sun  protection, self exams reviewed.  Followup as noted [facility] routine checkup approximately 4 months or p.r.n.    Encounter Times  Including precharting, reviewing chart, prior notes obtaining history: 5 minutes, medical exam :10 minutes, notes on body map, plan, counseling 10minutes My total time spent caring for the patient on the day of the encounter: 25 minutes       The patient indicates understanding of these issues and agrees to the plan.  The patient is asked to return as noted in follow-up/ above.    This note was generated using Dragon voice recognition software.  Please contact me regarding any confusion resulting from errors in recognition.   Note to patient and family: The 21st Century Cures Act makes medical notes like these available to patients. However, be advised this is a medical document. It is intended as [id number] communication and monitoring of a patient's care needs. It is written in medical language and may contain abbreviations or verbiage that are unfamiliar. It may appear blunt or direct. Medical documents are intended to carry relevant information, facts as evident and the clinical opinion of the practitioner.

## 2024-04-02 ENCOUNTER — OFFICE VISIT (OUTPATIENT)
Dept: INTERNAL MEDICINE CLINIC | Facility: CLINIC | Age: 69
End: 2024-04-02
Payer: MEDICARE

## 2024-04-02 VITALS
HEIGHT: 70 IN | BODY MASS INDEX: 28.69 KG/M2 | SYSTOLIC BLOOD PRESSURE: 122 MMHG | DIASTOLIC BLOOD PRESSURE: 72 MMHG | HEART RATE: 62 BPM | WEIGHT: 200.38 LBS

## 2024-04-02 DIAGNOSIS — E78.00 HYPERCHOLESTEROLEMIA: ICD-10-CM

## 2024-04-02 DIAGNOSIS — Z00.00 ANNUAL PHYSICAL EXAM: Primary | ICD-10-CM

## 2024-04-02 DIAGNOSIS — Z00.00 ENCOUNTER FOR ANNUAL HEALTH EXAMINATION: ICD-10-CM

## 2024-04-02 DIAGNOSIS — C44.99 RECURRENT SKIN CANCER: ICD-10-CM

## 2024-04-02 DIAGNOSIS — I10 ESSENTIAL HYPERTENSION: ICD-10-CM

## 2024-04-02 DIAGNOSIS — K21.9 GASTROESOPHAGEAL REFLUX DISEASE, UNSPECIFIED WHETHER ESOPHAGITIS PRESENT: ICD-10-CM

## 2024-04-02 DIAGNOSIS — N40.0 BENIGN PROSTATIC HYPERPLASIA, UNSPECIFIED WHETHER LOWER URINARY TRACT SYMPTOMS PRESENT: ICD-10-CM

## 2024-04-02 NOTE — PROGRESS NOTES
Subjective:   Anish Smiental is a 68 year old male who presents this morning for an MA (Medicare Advantage) Supervisit (Once per calendar year) and scheduled follow up of multiple significant but stable problems  including hypertension and hypercholesterolemia    His last Medicare physical was April 2023.  He has been feeling well, and is recovering uneventfully from recent right inguinal hernia repair.  No particular issues for discussion.  Ozempic is no longer covered by his insurance plan for weight loss, and he decided to stop atorvastatin.    In terms of his recurrent skin cancer, he sees Dermatology regularly.  In terms of his hypertension, BP checks typically 110-120/70-80 at home.  Diet healthy and he has been exercising regularly, pickleball 3-4 days weekly and walking 2-1/2 miles daily.  Weight down 28 pounds since physical April 2023.    History/Other:   Fall Risk Assessment:   He has been screened for Falls and is High Risk. Fall Prevention information provided to patient in After Visit Summary.    Do you feel unsteady when standing or walking?: No  Do you worry about falling?: No  Have you fallen in the past year?: Yes  Were you injured?: (P) No     Cognitive Assessment:   He had a completely normal cognitive assessment - see flowsheet entries     Functional Ability/Status:   Anish Simental has some abnormal functions as listed below:  He has difficulties Affording Meds based on screening of functional status. He has Hearing problems based on screening of functional status.He has Vision problems based on screening of functional status.       Depression Screening (PHQ-2/PHQ-9): PHQ-2 SCORE: 0  , done 3/26/2024   Trouble falling or staying asleep, or sleeping too much: 1     If you checked off any problems, how difficult have these problems made it for you to do your work, take care of things at home, or get along with other people?: Not difficult at all    Last [place] Suicide  Screening on 4/2/2024 was No Risk.     Advanced Directives:   He does NOT have a Living Will. [Do you have a living will?: No]  He does NOT have a Power of  for Health Care. [Do you have a healthcare power of ?: No]  Not discussed      Patient Active Problem List   Diagnosis    Hypertension    Hypercholesterolemia    BPH (benign prostatic hyperplasia)    Recurrent skin cancer    Erectile dysfunction    Tobacco use    GERD (gastroesophageal reflux disease)    BCC (basal cell carcinoma of skin)    Non-recurrent unilateral inguinal hernia without obstruction or gangrene     Allergies:  He is allergic to penicillins.    Current Medications:  Outpatient Medications Marked as Taking for the 4/2/24 encounter (Office Visit) with Stan Martin MD   Medication Sig    lisinopril-hydroCHLOROthiazide 10-12.5 MG Oral Tab Take 1 tablet by mouth daily.    Lansoprazole 15 MG Oral Capsule Delayed Release Take 1 capsule (15 mg total) by mouth daily.    tadalafil 5 MG Oral Tab Take 1 tablet (5 mg total) by mouth daily as needed for Erectile Dysfunction. (Patient taking differently: Take 1 tablet (5 mg total) by mouth daily.)       Medical History:  He  has a past medical history of Basal cell carcinoma (2017), Basal cell carcinoma (2017), BCC (basal cell carcinoma of skin) (2017), BCC (basal cell carcinoma of skin) (2017), BCC (basal cell carcinoma) (2022), BCC (basal cell carcinoma) (2022), BCC (basal cell carcinoma) (2023), BPH (benign prostatic hyperplasia), Erectile dysfunction, Essential hypertension, Folliculitis (2020), GERD (gastroesophageal reflux disease), Hearing impairment, Hypercholesterolemia, and Recurrent skin cancer.  Surgical History:  He  has a past surgical history that includes elbow surgery (Right, 1971); other (09/2008); other (11/2008); other (07/2016); other (08/2017); other (12/2017); other (07/2019); and Inguinal hernia repair (Right, 03/20/2024).   Family History:  His family history  includes ALS in his father; Alzheimers in his mother; Cancer in his maternal grandfather; Diabetes in his maternal grandfather; Glioblastoma in his sister; Heart Disease in his father and paternal uncle.  Social History:  He  reports that he quit smoking about 2 years ago. His smoking use included cigarettes. He has a 35 pack-year smoking history. He has never been exposed to tobacco smoke. He has never used smokeless tobacco. He reports current alcohol use. He reports that he does not use drugs.    Tobacco:  He smoked tobacco in the past but quit greater than 12 months ago.  Social History    Tobacco Use      Smoking status: Former        Packs/day: 1.00        Years: 35.00        Additional pack years: 0.00        Total pack years: 35.00        Types: Cigarettes        Quit date: 2022        Years since quittin.2        Passive exposure: Never      Smokeless tobacco: Never       CAGE Alcohol Screen:   CAGE screening score of 0 on 3/26/2024, showing low risk of alcohol abuse.      Patient Care Team:  Stan Martin MD as PCP - General (Internal Medicine)  Mariano Saeed MD (SURGERY, GENERAL)    Review of Systems    REVIEW OF SYSTEMS:   GENERAL: No fever  LUNGS: No cough wheezing or shortness of breath  CARDIAC: No lightheadedness palpitations or chest pain  GI: Occasional constipation improved with Metamucil.  No anorexia heartburn dysphagia nausea vomiting abdominal pain diarrhea or rectal bleeding  : Bladder self-catheterization 3 days weekly.  Chronic nocturia x 1.  No urinary frequency dysuria or hematuria  MUSCULOSKELETAL: No leg swelling  NEURO: No headaches     Objective:   Physical Exam    EXAM:   GENERAL: Pleasant male appearing well in no distress  /72   Pulse 62   Ht 5' 10\" (1.778 m)   Wt 200 lb 6.4 oz (90.9 kg)   BMI 28.75 kg/m²   HEENT: Anicteric, conjunctiva pink, oropharynx normal  NECK: Supple without mass or thyromegaly  NODES: No peripheral adenopathy  LUNGS: Resonant to  percussion and clear to auscultation  CARDIAC: Rhythm regular S1 S2 normal without murmur or edema  ABDOMEN: Bowel sounds normal soft nontender without mass or hepatosplenomegaly  EXTREMITIES: Normal without cyanosis or clubbing  PULSES: Carotid upstrokes 2+ without carotid bruits, distal pulses 2+ bilateral dorsalis pedis and posterior tibial  NEURO: Reflexes 2+ bilaterally and symmetric     /72   Pulse 62   Ht 5' 10\" (1.778 m)   Wt 200 lb 6.4 oz (90.9 kg)   BMI 28.75 kg/m²  Estimated body mass index is 28.75 kg/m² as calculated from the following:    Height as of this encounter: 5' 10\" (1.778 m).    Weight as of this encounter: 200 lb 6.4 oz (90.9 kg).    Medicare Hearing Assessment:   Hearing Screening    Time taken: 4/2/2024  7:55 AM  Entry User: Cassie London LPN  Screening Method: Finger Rub  Finger Rub Result: Pass               Assessment & Plan:   Anish Simental is a 68 year old male who presents for a Medicare Assessment.     1. Annual physical exam (Primary)  Prevnar 20 vaccine today.  Also recommend 2 doses of Shingrix at pharmacy  Check CMP CBC lipid profile screening PSA and FIT test.  Order sent  Continue current medications  Reinforced healthy diet and regular exercise, maintaining current body weight now that he is off Ozempic  Annual Medicare physical  Return visit in 6 months for blood pressure check.  -     Comp Metabolic Panel (14); Future; Expected date: 04/02/2024  -     CBC, Platelet; No Differential; Future; Expected date: 04/02/2024  -     Lipid Panel; Future; Expected date: 04/02/2024  -     PSA Total, Screen; Future; Expected date: 04/02/2024  -     PCV20 VACCINE FOR INTRAMUSCULAR USE  -     Occult Blood, Fecal, FIT Immunoassay; Future; Expected date: 04/02/2024    2. Essential hypertension  Well-controlled  Continue lisinopril/hydrochlorothiazide    3. Hypercholesterolemia  Now off statin.  Await labs  Discussed resuming atorvastatin.  He will consider    4.  Gastroesophageal reflux disease, unspecified whether esophagitis present  Controlled.  Continue PPI    5. Benign prostatic hyperplasia, unspecified whether lower urinary tract symptoms present  Continue tadalafil daily    6. Recurrent skin cancer  Ongoing follow-up with Dermatology    The patient indicates understanding of these issues and agrees to the plan.  Reinforced healthy diet, lifestyle, and exercise.      No follow-ups on file.     Stan Martin MD, 4/2/2024     Supplementary Documentation:   General Health:  In the past six months, have you lost more than 10 pounds without trying?: 2 - No  Has your appetite been poor?: No  Type of Diet: Balanced  How does the patient maintain a good energy level?: Appropriate Exercise;Daily Walks  How would you describe your daily physical activity?: Moderate  How would you describe your current health state?: Good  How do you maintain positive mental well-being?: Social Interaction;Puzzles;Games;Visiting Friends;Visiting Family  On a scale of 0 to 10, with 0 being no pain and 10 being severe pain, what is your pain level?: 2 - (Mild)  In the past six months, have you experienced urine leakage?: 1-Yes  At any time do you feel concerned for the safety/well-being of yourself and/or your children, in your home or elsewhere?: No        Anish Simental's SCREENING SCHEDULE   Tests on this list are recommended by your physician but may not be covered, or covered at this frequency, by your insurer.   Please check with your insurance carrier before scheduling to verify coverage.   PREVENTATIVE SERVICES FREQUENCY &  COVERAGE DETAILS LAST COMPLETION DATE   Diabetes Screening    Fasting Blood Sugar / Glucose    One screening every 12 months if never tested or if previously tested but not diagnosed with pre-diabetes   One screening every 6 months if diagnosed with pre-diabetes Lab Results   Component Value Date    GLU 93 04/03/2023        Cardiovascular Disease Screening     Lipid Panel  Cholesterol  Lipoprotein (HDL)  Triglycerides Covered every 5 years for all Medicare beneficiaries without apparent signs or symptoms of cardiovascular disease Lab Results   Component Value Date    CHOLEST 120 09/26/2023    HDL 67 (H) 09/26/2023    LDL 42 09/26/2023    TRIG 43 09/26/2023         Electrocardiogram (EKG)   Covered if needed at Welcome to Medicare, and non-screening if indicated for medical reasons 07/01/2019      Ultrasound Screening for Abdominal Aortic Aneurysm (AAA) Covered once in a lifetime for one of the following risk factors    Men who are 65-75 years old and have ever smoked    Anyone with a family history -     Colorectal Cancer Screening  Covered for ages 50-85; only need ONE of the following:    Colonoscopy   Covered every 10 years    Covered every 2 years if patient is at high risk or previous colonoscopy was abnormal -    Health Maintenance   Topic Date Due    Colorectal Cancer Screening  04/05/2024       Flexible Sigmoidoscopy   Covered every 4 years -    Fecal Occult Blood Test Covered annually 04/05/2023   Prostate Cancer Screening    Prostate-Specific Antigen (PSA) Annually Lab Results   Component Value Date    PSA 2.13 05/20/2021     Health Maintenance   Topic Date Due    PSA  04/03/2025      Immunizations    Influenza Covered once per flu season  Please get every year 09/28/2023  No recommendations at this time    Pneumococcal Each vaccine ([id number] & [id number]) covered once after 65 Prevnar 13: -    Pvqtmusax43: 11/09/2021     Pneumococcal Vaccination(2 of 2 - PCV) due on 11/09/2022    Hepatitis B One screening covered for patients with certain risk factors   -  No recommendations at this time    Tetanus Toxoid Not covered by Medicare Part B unless medically necessary (cut with metal); may be covered with your pharmacy prescription benefits -    Tetanus, Diptheria and Pertusis TD and TDaP Not covered by Medicare Part B -  No recommendations at this time    Zoster  Not covered by Medicare Part B; may be covered with your pharmacy  prescription benefits -  Zoster Vaccines(1 of 2) Never done     Annual Monitoring of Persistent Medications (ACE/ARB, digoxin diuretics, anticonvulsants)    Potassium Annually Lab Results   Component Value Date    K 3.7 04/03/2023         Creatinine   Annually Lab Results   Component Value Date    CREATSERUM 0.99 04/03/2023         BUN Annually Lab Results   Component Value Date    BUN 15 04/03/2023       Drug Serum Conc Annually No results found for: \"DIGOXIN\", \"DIG\", \"VALP\"

## 2024-04-02 NOTE — PATIENT INSTRUCTIONS
Your blood pressure today was excellent at 122/72 and your exam was normal  You received a Prevnar 20 vaccine today  Please get 2 doses of Shingrix vaccine at your pharmacy  Come in soon when you can for blood and stool testing  Continue current medication, healthy diet and regular exercise  Return visit in 6 months  Anish Simental's SCREENING SCHEDULE   Tests on this list are recommended by your physician but may not be covered, or covered at this frequency, by your insurer.   Please check with your insurance carrier before scheduling to verify coverage.   PREVENTATIVE SERVICES FREQUENCY &  COVERAGE DETAILS LAST COMPLETION DATE   Diabetes Screening    Fasting Blood Sugar / Glucose    One screening every 12 months if never tested or if previously tested but not diagnosed with pre-diabetes   One screening every 6 months if diagnosed with pre-diabetes Lab Results   Component Value Date    GLU 93 04/03/2023        Cardiovascular Disease Screening    Lipid Panel  Cholesterol  Lipoprotein (HDL)  Triglycerides Covered every 5 years for all Medicare beneficiaries without apparent signs or symptoms of cardiovascular disease Lab Results   Component Value Date    CHOLEST 120 09/26/2023    HDL 67 (H) 09/26/2023    LDL 42 09/26/2023    TRIG 43 09/26/2023         Electrocardiogram (EKG)   Covered if needed at Welcome to Medicare, and non-screening if indicated for medical reasons 07/01/2019      Ultrasound Screening for Abdominal Aortic Aneurysm (AAA) Covered once in a lifetime for one of the following risk factors   • Men who are 65-75 years old and have ever smoked   • Anyone with a family history -     Colorectal Cancer Screening  Covered for ages 50-85; only need ONE of the following:    Colonoscopy   Covered every 10 years    Covered every 2 years if patient is at high risk or previous colonoscopy was abnormal -    Health Maintenance   Topic Date Due   • Colorectal Cancer Screening  04/05/2024       Flexible  Sigmoidoscopy   Covered every 4 years -    Fecal Occult Blood Test Covered annually 04/05/2023   Prostate Cancer Screening    Prostate-Specific Antigen (PSA) Annually Lab Results   Component Value Date    PSA 2.13 05/20/2021     Health Maintenance   Topic Date Due   • PSA  04/03/2025      Immunizations    Influenza Covered once per flu season  Please get every year 09/28/2023  No recommendations at this time    Pneumococcal Each vaccine ([id number] & [id number]) covered once after 65 Prevnar 13: -    Vrrczxmtc75: 11/09/2021     Pneumococcal Vaccination(2 of 2 - PCV) due on 11/09/2022    Hepatitis B One screening covered for patients with certain risk factors   -  No recommendations at this time    Tetanus Toxoid Not covered by Medicare Part B unless medically necessary (cut with metal); may be covered with your pharmacy prescription benefits -    Tetanus, Diptheria and Pertusis TD and TDaP Not covered by Medicare Part B -  No recommendations at this time    Zoster Not covered by Medicare Part B; may be covered with your pharmacy  prescription benefits -  Zoster Vaccines(1 of 2) Never done     Annual Monitoring of Persistent Medications (ACE/ARB, digoxin diuretics, anticonvulsants)    Potassium Annually Lab Results   Component Value Date    K 3.7 04/03/2023         Creatinine   Annually Lab Results   Component Value Date    CREATSERUM 0.99 04/03/2023         BUN Annually Lab Results   Component Value Date    BUN 15 04/03/2023       Drug Serum Conc Annually No results found for: \"DIGOXIN\", \"DIG\", \"VALP\"

## 2024-04-04 ENCOUNTER — LAB ENCOUNTER (OUTPATIENT)
Dept: LAB | Facility: HOSPITAL | Age: 69
End: 2024-04-04
Attending: INTERNAL MEDICINE
Payer: MEDICARE

## 2024-04-04 DIAGNOSIS — Z00.00 ANNUAL PHYSICAL EXAM: ICD-10-CM

## 2024-04-04 LAB
ALBUMIN SERPL-MCNC: 3.9 G/DL (ref 3.2–4.8)
ALBUMIN/GLOB SERPL: 1.5 {RATIO} (ref 1–2)
ALP LIVER SERPL-CCNC: 86 U/L
ALT SERPL-CCNC: 13 U/L
ANION GAP SERPL CALC-SCNC: 5 MMOL/L (ref 0–18)
AST SERPL-CCNC: 19 U/L (ref ?–34)
BILIRUB SERPL-MCNC: 0.7 MG/DL (ref 0.2–1.1)
BUN BLD-MCNC: 25 MG/DL (ref 9–23)
BUN/CREAT SERPL: 26.3 (ref 10–20)
CALCIUM BLD-MCNC: 9.2 MG/DL (ref 8.7–10.4)
CHLORIDE SERPL-SCNC: 109 MMOL/L (ref 98–112)
CHOLEST SERPL-MCNC: 157 MG/DL (ref ?–200)
CO2 SERPL-SCNC: 26 MMOL/L (ref 21–32)
COMPLEXED PSA SERPL-MCNC: 1.39 NG/ML (ref ?–4)
CREAT BLD-MCNC: 0.95 MG/DL
DEPRECATED RDW RBC AUTO: 43.6 FL (ref 35.1–46.3)
EGFRCR SERPLBLD CKD-EPI 2021: 88 ML/MIN/1.73M2 (ref 60–?)
ERYTHROCYTE [DISTWIDTH] IN BLOOD BY AUTOMATED COUNT: 13.4 % (ref 11–15)
FASTING PATIENT LIPID ANSWER: YES
FASTING STATUS PATIENT QL REPORTED: YES
GLOBULIN PLAS-MCNC: 2.6 G/DL (ref 2.8–4.4)
GLUCOSE BLD-MCNC: 92 MG/DL (ref 70–99)
HCT VFR BLD AUTO: 41.2 %
HDLC SERPL-MCNC: 70 MG/DL (ref 40–59)
HGB BLD-MCNC: 14.1 G/DL
LDLC SERPL CALC-MCNC: 77 MG/DL (ref ?–100)
MCH RBC QN AUTO: 30.1 PG (ref 26–34)
MCHC RBC AUTO-ENTMCNC: 34.2 G/DL (ref 31–37)
MCV RBC AUTO: 87.8 FL
NONHDLC SERPL-MCNC: 87 MG/DL (ref ?–130)
OSMOLALITY SERPL CALC.SUM OF ELEC: 294 MOSM/KG (ref 275–295)
PLATELET # BLD AUTO: 278 10(3)UL (ref 150–450)
POTASSIUM SERPL-SCNC: 4.3 MMOL/L (ref 3.5–5.1)
PROT SERPL-MCNC: 6.5 G/DL (ref 5.7–8.2)
RBC # BLD AUTO: 4.69 X10(6)UL
SODIUM SERPL-SCNC: 140 MMOL/L (ref 136–145)
TRIGL SERPL-MCNC: 44 MG/DL (ref 30–149)
VLDLC SERPL CALC-MCNC: 7 MG/DL (ref 0–30)
WBC # BLD AUTO: 4.6 X10(3) UL (ref 4–11)

## 2024-04-04 PROCEDURE — 36415 COLL VENOUS BLD VENIPUNCTURE: CPT

## 2024-04-04 PROCEDURE — 80061 LIPID PANEL: CPT

## 2024-04-04 PROCEDURE — 80053 COMPREHEN METABOLIC PANEL: CPT

## 2024-04-04 PROCEDURE — 85027 COMPLETE CBC AUTOMATED: CPT

## 2024-04-09 ENCOUNTER — LAB ENCOUNTER (OUTPATIENT)
Dept: LAB | Facility: HOSPITAL | Age: 69
End: 2024-04-09
Attending: INTERNAL MEDICINE
Payer: MEDICARE

## 2024-04-09 DIAGNOSIS — Z00.00 ANNUAL PHYSICAL EXAM: ICD-10-CM

## 2024-04-09 LAB — HEMOCCULT STL QL: NEGATIVE

## 2024-04-09 PROCEDURE — 82274 ASSAY TEST FOR BLOOD FECAL: CPT

## 2024-04-18 ENCOUNTER — OFFICE VISIT (OUTPATIENT)
Dept: OTOLARYNGOLOGY | Facility: CLINIC | Age: 69
End: 2024-04-18
Payer: MEDICARE

## 2024-04-18 DIAGNOSIS — R22.0 FACIAL MASS: Primary | ICD-10-CM

## 2024-04-18 PROCEDURE — 1160F RVW MEDS BY RX/DR IN RCRD: CPT | Performed by: SPECIALIST

## 2024-04-18 PROCEDURE — 99202 OFFICE O/P NEW SF 15 MIN: CPT | Performed by: SPECIALIST

## 2024-04-18 PROCEDURE — 10021 FNA BX W/O IMG GDN 1ST LES: CPT | Performed by: SPECIALIST

## 2024-04-18 PROCEDURE — 1159F MED LIST DOCD IN RCRD: CPT | Performed by: SPECIALIST

## 2024-04-19 NOTE — PATIENT INSTRUCTIONS
Had a 2 x 2 cm left preauricular subcutaneous mass.  Although this may be an dermoid cyst, as you have had a basal cell cell cancer in the area, this could also represent a basal cell cancer.  A fine-needle aspirate was done.  I will of course notify you of all results.

## 2024-04-19 NOTE — PROGRESS NOTES
Anish Simental is a 68 year old male.   Chief Complaint   Patient presents with    Cyst     Referred for epidermal cyst by LT ear. Onset about a year ago. Noticed it has grown in size. Denies any pain or draining.      HPI:   Patient here to get his left preauricular area checked.  He has had several basal cell cancers removed including 1 in this area.    Current Outpatient Medications   Medication Sig Dispense Refill    lisinopril-hydroCHLOROthiazide 10-12.5 MG Oral Tab Take 1 tablet by mouth daily. 90 tablet 3    Lansoprazole 15 MG Oral Capsule Delayed Release Take 1 capsule (15 mg total) by mouth daily. 90 capsule 1    tadalafil 5 MG Oral Tab Take 1 tablet (5 mg total) by mouth daily as needed for Erectile Dysfunction. (Patient taking differently: Take 1 tablet (5 mg total) by mouth daily.) 90 tablet 3      Past Medical History:    Basal cell carcinoma    left lower cheek    Basal cell carcinoma    left lower chest    BCC (basal cell carcinoma of skin)    right alar rim    BCC (basal cell carcinoma of skin)    left vertex scalp    BCC (basal cell carcinoma)    left lateral zygoma    BCC (basal cell carcinoma)    left preauricular cheek    BCC (basal cell carcinoma)    left lateral zygoma    BPH (benign prostatic hyperplasia)    Erectile dysfunction    Essential hypertension    Folliculitis    right lateral vertex - chronic folliculitis    GERD (gastroesophageal reflux disease)    Hearing impairment    both ears    Hypercholesterolemia    Recurrent skin cancer      Social History:  Social History     Socioeconomic History    Marital status:    Tobacco Use    Smoking status: Former     Current packs/day: 0.00     Average packs/day: 1 pack/day for 35.0 years (35.0 ttl pk-yrs)     Types: Cigarettes     Start date: 1987     Quit date: 2022     Years since quittin.2     Passive exposure: Never    Smokeless tobacco: Never   Vaping Use    Vaping status: Every Day   Substance and Sexual  Activity    Alcohol use: Yes     Comment: 2-3 beers or glasses of wine daily    Drug use: No    Sexual activity: Not Currently     Partners: Female   Other Topics Concern    Caffeine Concern Yes     Comment: coffee, 1 cup/day    Outdoor occupation No    Pt has a pacemaker No    Pt has a defibrillator No    Reaction to local anesthetic No        REVIEW OF SYSTEMS:   GENERAL HEALTH: feels well otherwise  GENERAL : denies fever, chills, sweats, weight loss, weight gain  SKIN: denies any unusual skin lesions or rashes  RESPIRATORY: denies shortness of breath with exertion  NEURO: denies headaches    EXAM:   There were no vitals taken for this visit.  System Details   Skin Inspection - Normal.   Constitutional Overall appearance - Normal.   Head/Face Facial features - Normal. Eyebrows - Normal. Skull - Normal.  2 x 2 cm subcutaneous mass in the left preauricular area very close to prior incision site of a basal cell carcinoma which was removed in the past by his dermatologist.  Sent was obtained.  Area was fully cleaned and then anesthetized with 1% lidocaine with 1 100,000 epinephrine.  A 22-gauge needle was used to aspirate the area.  No complications.   Eyes Conjunctiva - Right: Normal, Left: Normal. Pupil - Right: Normal, Left: Normal.    Ears Inspection - Right: Normal, Left: Normal.   Canal - Right: Normal, Left: Normal.   TM - Right: Normal, Left: Normal.   Nasal External nose - Normal.   Nasal septum - Normal.  Turbinates - Normal.   Oral/Oropharynx Lips - Normal, Tonsils - Normal, Tongue - Normal    Neck Exam Inspection - Normal. Palpation - Normal. Parotid gland - Normal. Thyroid gland - Normal.   Lymph Detail Submental. Submandibular. Anterior cervical. Posterior cervical. Supraclavicular all without enlargement   Psychiatric Orientation - Oriented to time, place, person & situation. Appropriate mood and affect.   Neurological Memory - Normal. Cranial nerves - Cranial nerves II through XII grossly intact.   Normal facial nerve function bilaterally     ASSESSMENT AND PLAN:   1. Facial mass  Differential diagnosis includes epidermoid cyst versus recurrent or residual basal cell cancer.  A fine-needle aspirate was sent.  I will of course notify the patient of all results.  - Cytology,Fine Needle; Future  - Cytology,Fine Needle      The patient indicates understanding of these issues and agrees to the plan.      Luna Virk MD  4/18/2024  9:43 PM

## 2024-04-22 DIAGNOSIS — R22.0 FACIAL MASS: Primary | ICD-10-CM

## 2024-04-22 NOTE — PROGRESS NOTES
Patient scheduled for EXCISION OF LEFT SUBCUTANEOUS FACE MASS WITH PRIMARY CLOSURE on 5/6/24 at [facility].

## 2024-05-03 NOTE — DISCHARGE INSTRUCTIONS
HOME INSTRUCTIONS      Follow up in  week for suture removal.  Appy neosporin ointment to the incision twice daily for 1 week  Follow up in 1 week for suture removal  Tylenol for pain  Call with any problems  Prescription = azithromycin        [facility] HOME CARE INSTRUCTIONS: POST-OP ANESTHESIA  The medication that you received for sedation or general anesthesia can last up to 24 hours. Your judgment and reflexes may be altered, even if you feel like your normal self.      We Recommend:   Do not drive any motor vehicle or bicycle   Avoid mowing the lawn, playing sports, or working with power tools/applicances (power saws, electric knives or mixers)   That you have someone stay with you on your first night home   Do not drink alcohol or take sleeping pills or tranquilizers   Do not sign legal documents within 24 hours of your procedure   If you had a nerve block for your surgery, take extra care not to put any pressure on your arm or hand for 24 hours    It is normal:  For you to have a sore throat if you had a breathing tube during surgery (while you were asleep!). The sore throat should get better within 48 hours. You can gargle with warm salt water (1/2 tsp in 4 oz warm water) or use a throat lozenge for comfort  To feel muscle aches or soreness especially in the abdomen, chest or neck. The achy feeling should go away in the next 24 hours  To feel weak, sleepy or \"wiped out\". Your should start feeling better in the next 24 hours.   To experience mild discomforts such as sore lip or tongue, headache, cramps, gas pains or a bloated feeling in your abdomen.   To experience mild back pain or soreness for a day or two if you had spinal or epidural anesthesia.   If you had laparoscopic surgery, to feel shoulder pain or discomfort on the day of surgery.   For some patients to have nausea after surgery/anesthesia    If you feel nausea or experience vomiting:   Try to move around less.   Eat less than usual or drink only  liquids until the next morning   Nausea should resolve in about 24 hours    If you have a problem when you are at home:    Call your surgeons office   Discharge Instructions: After Your Surgery  You’ve just had surgery. During surgery, you were given medicine called anesthesia to keep you relaxed and free of pain. After surgery, you may have some pain or nausea. This is common. Here are some tips for feeling better and getting well after surgery.   Going home  Your healthcare provider will show you how to take care of yourself when you go home. They'll also answer your questions. Have an adult family member or friend drive you home. For the first 24 hours after your surgery:   Don't drive or use heavy equipment.  Don't make important decisions or sign legal papers.  Take medicines as directed.  Don't drink alcohol.  Have someone stay with you, if needed. They can watch for problems and help keep you safe.  Be sure to go to all follow-up visits with your healthcare provider. And rest after your surgery for as long as your provider tells you to.   Coping with pain  If you have pain after surgery, pain medicine will help you feel better. Take it as directed, before pain becomes severe. Also, ask your healthcare provider or pharmacist about other ways to control pain. This might be with heat, ice, or relaxation. And follow any other instructions your surgeon or nurse gives you.      Stay on schedule with your medicine.     Tips for taking pain medicine  To get the best relief possible, remember these points:   Pain medicines can upset your stomach. Taking them with a little food may help.  Most pain relievers taken by mouth need at least 20 to 30 minutes to start to work.  Don't wait till your pain becomes severe before you take your medicine. Try to time your medicine so that you can take it before starting an activity. This might be before you get dressed, go for a walk, or sit down for dinner.  Constipation is a  common side effect of some pain medicines. Call your healthcare provider before taking any medicines such as laxatives or stool softeners to help ease constipation. Also ask if you should skip any foods. Drinking lots of fluids and eating foods such as fruits and vegetables that are high in fiber can also help. Remember, don't take laxatives unless your surgeon has prescribed them.  Drinking alcohol and taking pain medicine can cause dizziness and slow your breathing. It can even be deadly. Don't drink alcohol while taking pain medicine.  Pain medicine can make you react more slowly to things. Don't drive or run machinery while taking pain medicine.  Your healthcare provider may tell you to take acetaminophen to help ease your pain. Ask them how much you're supposed to take each day. Acetaminophen or other pain relievers may interact with your prescription medicines or other over-the-counter (OTC) medicines. Some prescription medicines have acetaminophen and other ingredients in them. Using both prescription and OTC acetaminophen for pain can cause you to accidentally overdose. Read the labels on your OTC medicines with care. This will help you to clearly know the list of ingredients, how much to take, and any warnings. It may also help you not take too much acetaminophen. If you have questions or don't understand the information, ask your pharmacist or healthcare provider to explain it to you before you take the OTC medicine.   Managing nausea  Some people have an upset stomach (nausea) after surgery. This is often because of anesthesia, pain, or pain medicine, less movement of food in the stomach, or the stress of surgery. These tips will help you handle nausea and eat healthy foods as you get better. If you were on a special food plan before surgery, ask your healthcare provider if you should follow it while you get better. Check with your provider on how your eating should progress. It may depend on the surgery  you had. These general tips may help:   Don't push yourself to eat. Your body will tell you when to eat and how much.  Start off with clear liquids and soup. They're easier to digest.  Next try semi-solid foods as you feel ready. These include mashed potatoes, applesauce, and gelatin.  Slowly move to solid foods. Don’t eat fatty, rich, or spicy foods at first.  Don't force yourself to have 3 large meals a day. Instead eat smaller amounts more often.  Take pain medicines with a small amount of solid food, such as crackers or toast. This helps prevent nausea.  When to call your healthcare provider  Call your healthcare provider right away if you have any of these:   You still have too much pain, or the pain gets worse, after taking the medicine. The medicine may not be strong enough. Or there may be a complication from the surgery.  You feel too sleepy, dizzy, or groggy. The medicine may be too strong.  Side effects such as nausea or vomiting. Your healthcare provider may advise taking other medicines to .  Skin changes such as rash, itching, or hives. This may mean you have an allergic reaction. Your provider may advise taking other medicines.  The incision looks different (for instance, part of it opens up).  Bleeding or fluid leaking from the incision site, and weren't told to expect that.  Fever of 100.4°F (38°C) or higher, or as directed by your provider.  Call 911  Call 911 right away if you have:   Trouble breathing  Facial swelling    If you have obstructive sleep apnea   You were given anesthesia medicine during surgery to keep you comfortable and free of pain. After surgery, you may have more apnea spells because of this medicine and other medicines you were given. The spells may last longer than normal.    At home:  Keep using the continuous positive airway pressure (CPAP) device when you sleep. Unless your healthcare provider tells you not to, use it when you sleep, day or night. CPAP is a common device  used to treat obstructive sleep apnea.  Talk with your provider before taking any pain medicine, muscle relaxants, or sedatives. Your provider will tell you about the possible dangers of taking these medicines.  Contact your provider if your sleeping changes a lot even when taking medicines as directed.  [employer] last reviewed this educational content on 10/1/2021  © 1367-9174 [employer]. All rights reserved. This information is not intended as a substitute for professional medical care. Always follow your healthcare professional's instructions.

## 2024-05-06 ENCOUNTER — HOSPITAL ENCOUNTER (OUTPATIENT)
Facility: HOSPITAL | Age: 69
Setting detail: HOSPITAL OUTPATIENT SURGERY
Discharge: HOME | End: 2024-05-06
Attending: SPECIALIST | Admitting: SPECIALIST
Payer: MEDICARE

## 2024-05-06 VITALS
OXYGEN SATURATION: 92 % | BODY MASS INDEX: 28.63 KG/M2 | WEIGHT: 200 LBS | HEART RATE: 65 BPM | DIASTOLIC BLOOD PRESSURE: 75 MMHG | SYSTOLIC BLOOD PRESSURE: 122 MMHG | TEMPERATURE: 97 F | HEIGHT: 70 IN | RESPIRATION RATE: 18 BRPM

## 2024-05-06 DIAGNOSIS — R22.0 FACIAL MASS: ICD-10-CM

## 2024-05-06 PROCEDURE — 88304 TISSUE EXAM BY PATHOLOGIST: CPT | Performed by: SPECIALIST

## 2024-05-06 PROCEDURE — 88305 TISSUE EXAM BY PATHOLOGIST: CPT | Performed by: SPECIALIST

## 2024-05-06 PROCEDURE — 0JB10ZZ EXCISION OF FACE SUBCUTANEOUS TISSUE AND FASCIA, OPEN APPROACH: ICD-10-PCS | Performed by: SPECIALIST

## 2024-05-06 RX ORDER — LIDOCAINE HYDROCHLORIDE AND EPINEPHRINE 10; 10 MG/ML; UG/ML
INJECTION, SOLUTION INFILTRATION; PERINEURAL AS NEEDED
Status: DISCONTINUED | OUTPATIENT
Start: 2024-05-06 | End: 2024-05-06 | Stop reason: HOSPADM

## 2024-05-06 RX ORDER — AZITHROMYCIN 250 MG/1
TABLET, FILM COATED ORAL
Qty: 6 TABLET | Refills: 0 | Status: SHIPPED | OUTPATIENT
Start: 2024-05-06

## 2024-05-06 NOTE — BRIEF OP NOTE
Pre-Operative Diagnosis: Facial mass [R22.0]     Post-Operative Diagnosis: Facial mass [R22.0]      Procedure Performed:   Excision of left subcutaneous face mass with primary closure    Surgeons and Role:     * Luna Virk MD - Primary    Assistant(s):        Surgical Findings: 2.6 x 1.6 subcutaneous facial mass.     Specimen: facial mass     Estimated Blood Loss: 3 mL    Dictation Number:  [id number]    Luna Virk MD  5/6/2024  3:52 PM

## 2024-05-06 NOTE — H&P
[facility]  part of [facility]    History and Physical    Anish Matt Simental Patient Status:  Hospital Outpatient Surgery    1955 MRN [id number]   Location [facility] OPERATING ROOM Attending Luna Virk MD   Hosp Day # 0 PCP Stan Martin MD     Date:  2024  Date of Admission:  (Not on file)    History provided by:patient  HPI:   No chief complaint on file.    HPI  Left preauricular mass.  Had several basal cell cancers removed in the area.  Fine needle aspirate negative for malignancy    History     Past Medical History:    Basal cell carcinoma    left lower cheek    Basal cell carcinoma    left lower chest    BCC (basal cell carcinoma of skin)    right alar rim    BCC (basal cell carcinoma of skin)    left vertex scalp    BCC (basal cell carcinoma)    left lateral zygoma    BCC (basal cell carcinoma)    left preauricular cheek    BCC (basal cell carcinoma)    left lateral zygoma    BPH (benign prostatic hyperplasia)    Erectile dysfunction    Essential hypertension    Folliculitis    right lateral vertex - chronic folliculitis    GERD (gastroesophageal reflux disease)    Hearing impairment    both ears    Hypercholesterolemia    Recurrent skin cancer     Past Surgical History:   Procedure Laterality Date    Elbow surgery Right 1971    Inguinal hernia repair Right 2024    Other  2008    Excision BCCa left temporal scalp    Other  2008    Mohs surgery BCCa hairline    Other  2016    Excision BCCa upper back    Other  2017    Excision BCCa left parietal scalp    Other  2017    Excision BCCa left lower cheek and left central chest    Other  2019    Cystoscopy, meatotomy, urethral dilatation     Family History   Problem Relation Age of Onset    Heart Disease Father          MI age 77    Other (ALS) Father     Heart Disease Paternal Uncle          MI age 60s    Diabetes Maternal Grandfather     Other (Alzheimers) Mother     Cancer  Maternal Grandfather         Details unknown    Other (Glioblastoma) Sister      Social History:  Social History     Socioeconomic History    Marital status:    Tobacco Use    Smoking status: Former     Current packs/day: 0.00     Average packs/day: 1 pack/day for 35.0 years (35.0 ttl pk-yrs)     Types: Cigarettes     Start date: 1987     Quit date: 2022     Years since quittin.3     Passive exposure: Never    Smokeless tobacco: Never   Vaping Use    Vaping status: Every Day   Substance and Sexual Activity    Alcohol use: Yes     Comment: 2-3 beers or glasses of wine daily    Drug use: No    Sexual activity: Not Currently     Partners: Female   Other Topics Concern    Caffeine Concern Yes     Comment: coffee, 1 cup/day    Outdoor occupation No    Pt has a pacemaker No    Pt has a defibrillator No    Reaction to local anesthetic No     Allergies/Medications:   Allergies:   Allergies   Allergen Reactions    Penicillins FACE FLUSHING     No skin peeling or blisters.      Medications: lisinopril- HCl, lansoprazole, tadalafil    Review of Systems:   Review of Systems  Left preauricular mass    Physical Exam:   Vital Signs:  Height 5' 10\" (1.778 m), weight 200 lb (90.7 kg).  Physical Exam  Face: 2 x 2 cm subcutaneous mass in the left preauricular area very close to the prior incision site of a basal cell carcinoma.  Ears: normal  Nose: normal  Mouth: normal  Neck: no adenopathy  Lungs: clear  Heart: regular rate and rhythm.  No murmurs        Results:     Lab Results   Component Value Date    WBC 4.6 2024    HGB 14.1 2024    HCT 41.2 2024    .0 2024    CREATSERUM 0.95 2024    BUN 25 (H) 2024     2024    K 4.3 2024     2024    CO2 26.0 2024    GLU 92 2024    CA 9.2 2024    ALB 3.9 2024    ALKPHO 86 2024    BILT 0.7 2024    TP 6.5 2024    AST 19 2024    ALT 13 2024    TSH 1.53  10/13/2017    PSA 2.13 05/20/2021     FNA left preauricular mass; Left ear cyst; fine needle aspiration:  Adequacy: Satisfactory for evaluation  General Category: Benign, inflammatory, reactive, or reparative changes  Diagnosis:  Rare neutrophils present  Rare lymphocytes present  No malignant cells identified        Assessment/Plan:     Impression: left preauricular mass.  Fine needle aspirate negative for basal cell cancer  Plan: excision under local anesthesia with primary closure.              Luna Virk MD  5/5/2024

## 2024-05-06 NOTE — INTERVAL H&P NOTE
Pre-op Diagnosis: Facial mass [R22.0]    The above referenced H&P was reviewed by Luna Virk MD on 5/6/2024, the patient was examined and no significant changes have occurred in the patient's condition since the H&P was performed.  I discussed with the patient and/or legal representative the potential benefits, risks and side effects of this procedure; the likelihood of the patient achieving goals; and potential problems that might occur during recuperation.  I discussed reasonable alternatives to the procedure, including risks, benefits and side effects related to the alternatives and risks related to not receiving this procedure.  We will proceed with procedure as planned.

## 2024-05-07 ENCOUNTER — TELEPHONE (OUTPATIENT)
Dept: OTOLARYNGOLOGY | Facility: CLINIC | Age: 69
End: 2024-05-07

## 2024-05-07 NOTE — OPERATIVE REPORT
[facility]    PATIENT'S NAME: AMARA NICOLE   ATTENDING PHYSICIAN: Luna Virk MD   OPERATING PHYSICIAN: Luna Virk MD   PATIENT ACCOUNT#:   [id number]    LOCATION:  [address]  MEDICAL RECORD #:   [id number]       YOB: 1955  ADMISSION DATE:       05/06/2024      OPERATION DATE:  05/06/2024    OPERATIVE REPORT    PREOPERATIVE DIAGNOSIS:  Left facial mass.  POSTOPERATIVE DIAGNOSIS:  Left facial mass.  PROCEDURE:  Excision of left subcutaneous facial mass with primary closure.    ANESTHESIA:  Local with a total of 2 mL of 1% lidocaine with 1:100,000 epinephrine used.    COMPLICATIONS:  None.    SPECIMEN:  Subcutaneous facial mass with overlying skin.    ESTIMATED BLOOD LOSS:  3 mL.    COMPLICATIONS:  None     INDICATIONS:  This is a 68-year-old male who had 2 prior basal cell cancers in this area.  A fine-needle aspirate was done on the subcutaneous neck mass.  This was benign, showing only inflammatory cells.  This area continued to grow.  For the above-mentioned reason, the procedure was indicated.    OPERATIVE TECHNIQUE:  The patient was taken to the operating room suite.  He was sterilely prepped and draped in usual fashion.  Elliptical incision was made over the prior incision site where the basal cell cancer had been removed.  There was some slight irregularity of the skin.  This was also incorporated into the incision.  There was a large roundish white mass, which was then circumferentially excised with care to maintain a margin and it was removed in its entirety.  The area was then cauterized with the bipolar cautery.  Instruments were changed.  Undermining of the skin was done, and there was a 2-layer closure of 5-0 chromic followed by 5-0 nylon, about 10 of the 5-0 nylon sutures were placed.  After this was done, Neosporin ointment followed by a sterile dressing was placed.  Patient tolerated the procedure well and was taken back to same-day surgery in  good condition.    Dictated By Luna Virk MD  d: 05/06/2024 15:55:26  t: 05/06/2024 21:40:57  Job [id number]  [place]/

## 2024-05-07 NOTE — TELEPHONE ENCOUNTER
Post op day #1 excision of left subcutaneous face mass with primary closure    Attempted to reach patient, LMTCB.    Post op instructions as follows: Follow up in week for suture removal, to Apply neosporin ointment to the incision twice daily for 1 week, Follow up in 1 week for suture removal, To use Tylenol for pain, Call office with any problems, to take Prescription = azithromycin as directed.

## 2024-05-08 ENCOUNTER — TELEPHONE (OUTPATIENT)
Dept: OTOLARYNGOLOGY | Facility: CLINIC | Age: 69
End: 2024-05-08

## 2024-05-10 ENCOUNTER — TELEPHONE (OUTPATIENT)
Dept: OTOLARYNGOLOGY | Facility: CLINIC | Age: 69
End: 2024-05-10

## 2024-05-10 NOTE — TELEPHONE ENCOUNTER
Patient wants to know if Mondays post op appointment to remove stitches can be rescheduled to Tuesday 5/14/24 to see Dr Virk at the [place] location in the morning?

## 2024-05-14 ENCOUNTER — OFFICE VISIT (OUTPATIENT)
Dept: OTOLARYNGOLOGY | Facility: CLINIC | Age: 69
End: 2024-05-14

## 2024-05-14 VITALS — HEIGHT: 70 IN | WEIGHT: 200 LBS | BODY MASS INDEX: 28.63 KG/M2

## 2024-05-14 DIAGNOSIS — L72.0 EPIDERMOID CYST OF FACE: Primary | ICD-10-CM

## 2024-05-14 PROCEDURE — 1160F RVW MEDS BY RX/DR IN RCRD: CPT | Performed by: SPECIALIST

## 2024-05-14 PROCEDURE — 1159F MED LIST DOCD IN RCRD: CPT | Performed by: SPECIALIST

## 2024-05-14 PROCEDURE — 99024 POSTOP FOLLOW-UP VISIT: CPT | Performed by: SPECIALIST

## 2024-05-14 PROCEDURE — 3008F BODY MASS INDEX DOCD: CPT | Performed by: SPECIALIST

## 2024-05-14 NOTE — PROGRESS NOTES
Postoperative day #8  Patient's status post excision of left facial epidermoid cyst.  No complaints.  Physical examination:  Sutures removed without difficulty.  Area healing nicely.  No evidence of infection.  Impression: Healing as expected.  Plan: Follow-up with any additional questions or problems.     Patient/Caregiver provided printed discharge information.

## 2024-05-14 NOTE — PATIENT INSTRUCTIONS
Your sutures were removed.  Area healing nicely.  Follow-up with any additional questions or problems.  Final pathology epidermoid cyst completely removed

## 2024-06-13 DIAGNOSIS — Z00.00 ANNUAL PHYSICAL EXAM: ICD-10-CM

## 2024-06-15 RX ORDER — MECOBALAMIN 5000 MCG
15 TABLET,DISINTEGRATING ORAL DAILY
Qty: 90 CAPSULE | Refills: 3 | Status: SHIPPED | OUTPATIENT
Start: 2024-06-15

## 2024-06-15 NOTE — TELEPHONE ENCOUNTER
Refill Passed Per Protocol    Requested Prescriptions   Pending Prescriptions Disp Refills    LANSOPRAZOLE 15 MG Oral Capsule Delayed Release [Pharmacy Med Name: LANSOPRAZOLE DR 15 MG CAPSULE] 90 capsule 1     Sig: TAKE 1 CAPSULE BY MOUTH EVERY DAY       Gastrointestional Medication Protocol Passed - 6/13/2024 12:23 AM        Passed - In person appointment or virtual visit in the past 12 mos or appointment in next 3 mos     Recent Outpatient Visits              1 month ago Epidermoid cyst of face    [facility], Luna Kuhn MD    Office Visit    1 month ago Facial mass    [facility] Luna Santos MD    Office Visit    2 [facility]    [facility]    2 months ago Epidermal cyst    [facility], Kathy Louis MD    Office Visit    3 months ago Non-recurrent unilateral inguinal hernia without obstruction or gangrene    [facility], Mariano Reeves MD    Office Visit                             Recent Outpatient Visits              1 month ago Epidermoid cyst of face    [facility], Luna Kuhn MD    Office Visit    1 month ago Facial mass    [facility], Luna Santos MD    Office Visit    2 months ago [facility]    [facility]    2 months ago [facility]    [facility]    3 months ago Non-recurrent unilateral inguinal hernia without obstruction or gangrene    [facility]    [facility] Visit

## 2024-07-20 ENCOUNTER — PATIENT MESSAGE (OUTPATIENT)
Dept: INTERNAL MEDICINE CLINIC | Facility: CLINIC | Age: 69
End: 2024-07-20

## 2024-07-21 NOTE — TELEPHONE ENCOUNTER
From: Anish Simental  To: Stan Martin  Sent: 2024 9:12 AM CDT  Subject: Wegovy    Sonam Davidson,  My insurance stopped covering my Ozempic medication this year. I learned today that the insurance will cover the medication Wegovy if it is prescribed to reduce the risk of cardiovascular events in adults who are considered obese or overweight.  In the past 3 months since I stopped taking Ozempic I have gained 15 pounds. I also feel that I am at risk for a cardiovascular event since my father and two of my uncles  from heart attacks.   Can you prescribe Wegovy for me?   I am concerned that I will gain back the weight that I lost in the past 2 years and that I am at risk for a “cardiovascular event”.  Thanks   Lavon Simental.  Ps will gladly come in for a visit if needed    minimal

## 2024-07-29 ENCOUNTER — TELEPHONE (OUTPATIENT)
Dept: INTERNAL MEDICINE CLINIC | Facility: CLINIC | Age: 69
End: 2024-07-29

## 2024-07-29 ENCOUNTER — OFFICE VISIT (OUTPATIENT)
Dept: INTERNAL MEDICINE CLINIC | Facility: CLINIC | Age: 69
End: 2024-07-29
Payer: MEDICARE

## 2024-07-29 VITALS
WEIGHT: 215.63 LBS | DIASTOLIC BLOOD PRESSURE: 68 MMHG | SYSTOLIC BLOOD PRESSURE: 128 MMHG | BODY MASS INDEX: 30.87 KG/M2 | HEART RATE: 71 BPM | HEIGHT: 70 IN

## 2024-07-29 DIAGNOSIS — I10 ESSENTIAL HYPERTENSION: ICD-10-CM

## 2024-07-29 DIAGNOSIS — E66.09 CLASS 1 OBESITY DUE TO EXCESS CALORIES WITH SERIOUS COMORBIDITY AND BODY MASS INDEX (BMI) OF 30.0 TO 30.9 IN ADULT: Primary | ICD-10-CM

## 2024-07-29 PROCEDURE — 3074F SYST BP LT 130 MM HG: CPT | Performed by: INTERNAL MEDICINE

## 2024-07-29 PROCEDURE — 3078F DIAST BP <80 MM HG: CPT | Performed by: INTERNAL MEDICINE

## 2024-07-29 PROCEDURE — 1159F MED LIST DOCD IN RCRD: CPT | Performed by: INTERNAL MEDICINE

## 2024-07-29 PROCEDURE — 99214 OFFICE O/P EST MOD 30 MIN: CPT | Performed by: INTERNAL MEDICINE

## 2024-07-29 PROCEDURE — 1160F RVW MEDS BY RX/DR IN RCRD: CPT | Performed by: INTERNAL MEDICINE

## 2024-07-29 PROCEDURE — 3008F BODY MASS INDEX DOCD: CPT | Performed by: INTERNAL MEDICINE

## 2024-07-29 PROCEDURE — G2211 COMPLEX E/M VISIT ADD ON: HCPCS | Performed by: INTERNAL MEDICINE

## 2024-07-29 PROCEDURE — 1126F AMNT PAIN NOTED NONE PRSNT: CPT | Performed by: INTERNAL MEDICINE

## 2024-07-29 NOTE — PATIENT INSTRUCTIONS
If approved, begin Wegovy 0.25 mg injected weekly x 4 doses, and then call us for a dose increase in 4 weeks  Your blood pressure today was good at 128/68  Continue current medications  Return visit in 6 months

## 2024-07-29 NOTE — PROGRESS NOTES
Anish Simental is a 69 year old male.   Chief Complaint   Patient presents with    Weight Gain     HPI:   Anish presents this morning to discuss recent weight gain, hoping to be prescribed Wegovy    He was previously prescribed Ozempic and was able to lose about 70 pounds.  He stopped Ozempic when it was no longer covered by his insurance plan.  Since his Medicare physical in April, he has gained 15 pounds.  He finds it difficult to watch his diet, but has been exercising regularly, walking 2 miles every day, pickleball and swimming.  He is aware that Medicare does not typically cover Wegovy for weight loss but may cover it if someone is at increased cardiovascular risk.  He has no history of CAD MI angina CHF TIA or CVA.    BP checks typically 120s/70s.  No headaches.  No lightheadedness or dizziness.  No palpitations or chest pain.  No shortness of breath.    Medications reviewed, as listed below.  Current Outpatient Medications   Medication Sig Dispense Refill    Lansoprazole 15 MG Oral Capsule Delayed Release Take 1 capsule (15 mg total) by mouth daily. 90 capsule 3    lisinopril-hydroCHLOROthiazide 10-12.5 MG Oral Tab Take 1 tablet by mouth daily. 90 tablet 3    tadalafil 5 MG Oral Tab Take 1 tablet (5 mg total) by mouth daily as needed for Erectile Dysfunction. (Patient taking differently: Take 1 tablet (5 mg total) by mouth daily.) 90 tablet 3     Allergies   Allergen Reactions    Penicillins FACE FLUSHING     No skin peeling or blisters.       Past Medical History:    Basal cell carcinoma    left lower cheek    Basal cell carcinoma    left lower chest    BCC (basal cell carcinoma of skin)    right alar rim    BCC (basal cell carcinoma of skin)    left vertex scalp    BCC (basal cell carcinoma)    left lateral zygoma    BCC (basal cell carcinoma)    left preauricular cheek    BCC (basal cell carcinoma)    left lateral zygoma    BPH (benign prostatic hyperplasia)    Erectile dysfunction    Essential  hypertension    Folliculitis    right lateral vertex - chronic folliculitis    GERD (gastroesophageal reflux disease)    Hearing impairment    both ears    Hypercholesterolemia    Recurrent skin cancer     Past Surgical History:   Procedure Laterality Date    Elbow surgery Right 1971    Inguinal hernia repair Right 2024    Other  2008    Excision BCCa left temporal scalp    Other  2008    Mohs surgery BCCa hairline    Other  2016    Excision BCCa upper back    Other  2017    Excision BCCa left parietal scalp    Other  2017    Excision BCCa left lower cheek and left central chest    Other  2019    Cystoscopy, meatotomy, urethral dilatation    Other surgical history Left 2024    Excision of left subcutaneous facial epidermoid cyst with primary closure.      Social History:  Social History     Socioeconomic History    Marital status:    Tobacco Use    Smoking status: Former     Current packs/day: 0.00     Average packs/day: 1 pack/day for 35.0 years (35.0 ttl pk-yrs)     Types: Cigarettes     Start date: 1987     Quit date: 2022     Years since quittin.5     Passive exposure: Never    Smokeless tobacco: Never   Vaping Use    Vaping status: Every Day   Substance and Sexual Activity    Alcohol use: Yes     Comment: 2-3 beers or glasses of wine daily    Drug use: No    Sexual activity: Not Currently     Partners: Female   Other Topics Concern    Caffeine Concern Yes     Comment: coffee, 1 cup/day    Outdoor occupation No    Pt has a pacemaker No    Pt has a defibrillator No    Reaction to local anesthetic No        EXAM:   GENERAL: Pleasant male appearing well in no distress  /68   Pulse 71   Ht 5' 10\" (1.778 m)   Wt 215 lb 9.6 oz (97.8 kg)   BMI 30.94 kg/m²   LUNGS: Resonant to percussion and clear to auscultation  CARDIAC: Rhythm regular S1 S2 normal without murmur   ABDOMEN: Bowel sounds normal soft nontender       ASSESSMENT AND PLAN:   1. Class 1 obesity  due to excess calories with serious comorbidity and body mass index (BMI) of 30.0 to 30.9 in adult  Prescription for Wegovy 0.25 mg injected weekly x 4 doses sent to pharmacy  If approved, recommend he contact us in 4 weeks for dose escalation    2. Essential hypertension  Well-controlled  Continue current medication  Return visit in 6 months      The patient indicates understanding of these issues and agrees to the plan.  The patient is asked to return in 6 months.    Stan Martin MD  7/29/2024  9:50 AM

## 2024-07-29 NOTE — TELEPHONE ENCOUNTER
Prior Authorization History  semaglutide-weight management 0.25 MG/0.5ML Subcutaneous Solution Auto-injector     History    View all authorizations for this medication  Waiting for Payer Response   7/29/2024  2:31 PM

## 2024-09-17 ENCOUNTER — TELEPHONE (OUTPATIENT)
Dept: SURGERY | Facility: CLINIC | Age: 69
End: 2024-09-17

## 2024-09-17 NOTE — TELEPHONE ENCOUNTER
[facility] medical supply order form was filled signed by Dr. HENRY and faxed to secure fax number, received fax confirmation and sent order form to scanning

## 2024-09-17 NOTE — TELEPHONE ENCOUNTER
Received a form for catheter supply refill, requesting Dr. Vickers signature.  Left in Dr. Palma desk to sign

## 2024-09-19 RX ORDER — ATORVASTATIN CALCIUM 10 MG/1
10 TABLET, FILM COATED ORAL NIGHTLY
Qty: 90 TABLET | Refills: 1 | Status: SHIPPED | OUTPATIENT
Start: 2024-09-19

## 2024-09-19 NOTE — TELEPHONE ENCOUNTER
Patient states he stopped taking atorvastatin 10 mg because his cholesterol was doing really well. He was advised not to stop by Dr. Martin but he did anyways. He has noticed that he hasn't been eating the best and thinks that he should start taking it again. Medication pended for your review and approval.    Last lipid was 4/4/24.     Future Appointments   Date Time Provider Department Center   9/23/2024  4:00 PM Kathy Oconnell MD [facility]

## 2024-09-23 ENCOUNTER — OFFICE VISIT (OUTPATIENT)
Dept: DERMATOLOGY CLINIC | Facility: CLINIC | Age: 69
End: 2024-09-23
Payer: MEDICARE

## 2024-09-23 DIAGNOSIS — L82.1 SEBORRHEIC KERATOSES: ICD-10-CM

## 2024-09-23 DIAGNOSIS — L30.9 DERMATITIS: ICD-10-CM

## 2024-09-23 DIAGNOSIS — Z08 ENCOUNTER FOR FOLLOW-UP SURVEILLANCE OF SKIN CANCER: ICD-10-CM

## 2024-09-23 DIAGNOSIS — Z85.828 ENCOUNTER FOR FOLLOW-UP SURVEILLANCE OF SKIN CANCER: ICD-10-CM

## 2024-09-23 DIAGNOSIS — D23.9 BENIGN NEOPLASM OF SKIN, UNSPECIFIED LOCATION: ICD-10-CM

## 2024-09-23 DIAGNOSIS — L57.0 AK (ACTINIC KERATOSIS): Primary | ICD-10-CM

## 2024-09-23 DIAGNOSIS — L81.4 LENTIGO: ICD-10-CM

## 2024-09-23 PROCEDURE — G2211 COMPLEX E/M VISIT ADD ON: HCPCS | Performed by: DERMATOLOGY

## 2024-09-23 PROCEDURE — 1160F RVW MEDS BY RX/DR IN RCRD: CPT | Performed by: DERMATOLOGY

## 2024-09-23 PROCEDURE — 99213 OFFICE O/P EST LOW 20 MIN: CPT | Performed by: DERMATOLOGY

## 2024-09-23 PROCEDURE — 1159F MED LIST DOCD IN RCRD: CPT | Performed by: DERMATOLOGY

## 2024-09-23 RX ORDER — CLOBETASOL PROPIONATE 0.5 MG/G
1 CREAM TOPICAL 2 TIMES DAILY
Qty: 60 G | Refills: 0 | Status: SHIPPED | OUTPATIENT
Start: 2024-09-23 | End: 2025-09-23

## 2024-09-24 NOTE — PROGRESS NOTES
Anish Simental is a 69 year old male.  HPI:     CC:    Chief Complaint   Patient presents with    Lesion     LOV 3/24. Hx of Aks and BCC. Pt present with a lesion of concern on nose x 1 month. Pt states the spot is red, flat and dry.         Allergies:  Penicillins    HISTORY:    Past Medical History:    Basal cell carcinoma    left lower cheek    Basal cell carcinoma    left lower chest    BCC (basal cell carcinoma of skin)    right alar rim    BCC (basal cell carcinoma of skin)    left vertex scalp    BCC (basal cell carcinoma)    left lateral zygoma    BCC (basal cell carcinoma)    left preauricular cheek    BCC (basal cell carcinoma)    left lateral zygoma    BPH (benign prostatic hyperplasia)    Erectile dysfunction    Essential hypertension    Folliculitis    right lateral vertex - chronic folliculitis    GERD (gastroesophageal reflux disease)    Hearing impairment    both ears    Hypercholesterolemia    Recurrent skin cancer    Scoliosis    Not sure this condition exists      Past Surgical History:   Procedure Laterality Date    Elbow surgery Right     Hernia surgery      Inguinal hernia repair Right 2024    Other  2008    Excision BCCa left temporal scalp    Other  2008    Mohs surgery BCCa hairline    Other  2016    Excision BCCa upper back    Other  2017    Excision BCCa left parietal scalp    Other  2017    Excision BCCa left lower cheek and left central chest    Other  2019    Cystoscopy, meatotomy, urethral dilatation    Other surgical history Left 2024    Excision of left subcutaneous facial epidermoid cyst with primary closure.    Skin surgery      4 or 5 basal cell surgeries since       Family History   Problem Relation Age of Onset    Heart Disease Father          MI age 77    Other (ALS) Father     Heart Disease Paternal Uncle          MI age 60s    Diabetes Maternal Grandfather     Cancer Maternal Grandfather     Other (Alzheimers) Mother      Dementia Mother         Started around age 75    Depression Mother     Cancer Maternal Grandfather         Details unknown    Other (Glioblastoma) Sister     Cancer Sister         Brain cancer    Psychiatric Sister         Schizophrenia    Depression Brother     Psychiatric Daughter         Schizophrenia      Social History     Socioeconomic History    Marital status:    Tobacco Use    Smoking status: Former     Current packs/day: 0.00     Average packs/day: 1 pack/day for 35.0 years (35.0 ttl pk-yrs)     Types: Cigarettes     Start date: 1987     Quit date: 2022     Years since quittin.7     Passive exposure: Never    Smokeless tobacco: Never   Vaping Use    Vaping status: Every Day   Substance and Sexual Activity    Alcohol use: Yes     Alcohol/week: 16.0 standard drinks of alcohol     Types: 8 Glasses of wine, 8 Cans of beer per week     Comment: Pause use due to surgery    Drug use: No    Sexual activity: Not Currently     Partners: Female   Other Topics Concern    Caffeine Concern Yes     Comment: coffee, 1 cup/day    Grew up on a farm Yes    History of tanning No    Outdoor occupation No    Pt has a pacemaker No    Pt has a defibrillator No    Reaction to local anesthetic No        Current Outpatient Medications   Medication Sig Dispense Refill    clobetasol 0.05 % External Cream Apply 1 Application topically 2 (two) times daily. To rash on wrists, insect bites 60 g 0    atorvastatin 10 MG Oral Tab Take 1 tablet (10 mg total) by mouth nightly. 90 tablet 1    Lansoprazole 15 MG Oral Capsule Delayed Release Take 1 capsule (15 mg total) by mouth daily. 90 capsule 3    lisinopril-hydroCHLOROthiazide 10-12.5 MG Oral Tab Take 1 tablet by mouth daily. 90 tablet 3    tadalafil 5 MG Oral Tab Take 1 tablet (5 mg total) by mouth daily as needed for Erectile Dysfunction. (Patient taking differently: Take 1 tablet (5 mg total) by mouth daily.) 90 tablet 3     Allergies:   Allergies   Allergen  Reactions    Penicillins FACE FLUSHING     No skin peeling or blisters.        Past Medical History:    Basal cell carcinoma    left lower cheek    Basal cell carcinoma    left lower chest    BCC (basal cell carcinoma of skin)    right alar rim    BCC (basal cell carcinoma of skin)    left vertex scalp    BCC (basal cell carcinoma)    left lateral zygoma    BCC (basal cell carcinoma)    left preauricular cheek    BCC (basal cell carcinoma)    left lateral zygoma    BPH (benign prostatic hyperplasia)    Erectile dysfunction    Essential hypertension    Folliculitis    right lateral vertex - chronic folliculitis    GERD (gastroesophageal reflux disease)    Hearing impairment    both ears    Hypercholesterolemia    Recurrent skin cancer    Scoliosis    Not sure this condition exists     Past Surgical History:   Procedure Laterality Date    Elbow surgery Right 1971    Hernia surgery  2023    Inguinal hernia repair Right 03/20/2024    Other  09/2008    Excision BCCa left temporal scalp    Other  11/2008    Mohs surgery BCCa hairline    Other  07/2016    Excision BCCa upper back    Other  08/2017    Excision BCCa left parietal scalp    Other  12/2017    Excision BCCa left lower cheek and left central chest    Other  07/2019    Cystoscopy, meatotomy, urethral dilatation    Other surgical history Left 05/06/2024    Excision of left subcutaneous facial epidermoid cyst with primary closure.    Skin surgery      4 or 5 basal cell surgeries since 2010     Social History     Socioeconomic History    Marital status:      Spouse name: Not on file    Number of children: Not on file    Years of education: Not on file    Highest education level: Not on file   Occupational History    Not on file   Tobacco Use    Smoking status: Former     Current packs/day: 0.00     Average packs/day: 1 pack/day for 35.0 years (35.0 ttl pk-yrs)     Types: Cigarettes     Start date: 1/12/1987     Quit date: 1/12/2022     Years since quitting:  2.7     Passive exposure: Never    Smokeless tobacco: Never   Vaping Use    Vaping status: Every Day   Substance and Sexual Activity    Alcohol use: Yes     Alcohol/week: 16.0 standard drinks of alcohol     Types: 8 Glasses of wine, 8 Cans of beer per week     Comment: Pause use due to surgery    Drug use: No    Sexual activity: Not Currently     Partners: Female   Other Topics Concern     Service Not Asked    Blood Transfusions Not Asked    Caffeine Concern Yes     Comment: coffee, 1 cup/day    Occupational Exposure Not Asked    Hobby Hazards Not Asked    Sleep Concern Not Asked    Stress Concern Not Asked    Weight Concern Not Asked    Special Diet Not Asked    Back Care Not Asked    Exercise Not Asked    Bike Helmet Not Asked    Seat Belt Not Asked    Self-Exams Not Asked    Grew up on a farm Yes    History of tanning No    Outdoor occupation No    Pt has a pacemaker No    Pt has a defibrillator No    Reaction to local anesthetic No   Social History Narrative    Not on file     Social Determinants of Health     Financial Resource Strain: Not on file   Food Insecurity: Not on file   Transportation Needs: Not on file   Physical Activity: Not on file   Stress: Not on file   Social Connections: Not on file   Housing Stability: Not on file     Family History   Problem Relation Age of Onset    Heart Disease Father          MI age 77    Other (ALS) Father     Heart Disease Paternal Uncle          MI age 60s    Diabetes Maternal Grandfather     Cancer Maternal Grandfather     Other (Alzheimers) Mother     Dementia Mother         Started around age 75    Depression Mother     Cancer Maternal Grandfather         Details unknown    Other (Glioblastoma) Sister     Cancer Sister         Brain cancer    Psychiatric Sister         Schizophrenia    Depression Brother     Psychiatric Daughter         Schizophrenia       There were no vitals filed for this visit.    HPI:    Chief Complaint   Patient presents with     Lesion     LOV 3/24. Hx of Aks and BCC. Pt present with a lesion of concern on nose x 1 month. Pt states the spot is red, flat and dry.     Follow-up patient with history BCC, AK's, history of imiquimod, ENC for above lesions.     chest recent Mohs for BCC left zygoma, left preauricular cheek Dr. Almonte 11/22  generally careful with sunscreen     no recent surgeries.  Has not noted any other lesions of concern      Note scaly area on back  Patient presents with concerns above.    Past notes/ records and appropriate/relevant lab results including pathology and past body maps reviewed. Updated and new information noted in current visit.     Patient has been in their usual state of health.  History, medications, allergies reviewed as noted.      ROS:  Denies any other systemic complaints.  No new or changeing lesions other than noted above. No fevers, chills, night sweats, unusual sun sensitivity.  No other skin complaints.        History, medications, allergies reviewed as noted.       Physical Examination:     Well-developed well-nourished patient alert oriented in no acute distress.  Exam total-body performed, including scalp, head, neck, face,nails, hair, external eyes, including conjunctival mucosa, eyelids, lips external ears, back, chest,/ breasts, axillae,  abdomen, arms, legs, palms.     Multiple light to medium brown, well marginated, uniformly pigmented, macules and papules 6 mm and less scattered on exam. pigmented lesions examined with dermoscopy benign-appearing patterns.     Waxy tannish keratotic papules scattered, cherry-red vascular papules scattered.    See map today's date for lesions noted .      Otherwise remarkable for lesions as noted on map.  See details of examination  See Assessment /Plan for additional history and physical exam also:    Assessment / plan:    No orders of the defined types were placed in this encounter.      Meds & Refills for this Visit:  Requested Prescriptions      Signed Prescriptions Disp Refills    clobetasol 0.05 % External Cream 60 g 0     Sig: Apply 1 Application topically 2 (two) times daily. To rash on wrists, insect bites         Encounter Diagnoses   Name Primary?    AK (actinic keratosis) Yes    Encounter for follow-up surveillance of skin cancer     Seborrheic keratoses     Benign neoplasm of skin, unspecified location     Lentigo     Dermatitis          See details on map.      Remarkable for:    Patient seen for follow-up long-term monitoring, treatment of  Skin cancers, multiple, actinic keratoses, dermatitis  Plan of care:  ongoing surveillance, monitoring including regular follow-up due to longer term risk of recurrence, new lesions.  See previous notes.  There is a longitudinal care relationship with me, the care plan reflects the ongoing nature of the continuous relationship of care, and the medical record indicates that there is ongoing treatment of a serious/complex medical condition which I am currently managing.  is Applicable      Post excision epidermal inclusion cyst left preauricular at prior incision site excised by Dr. Luna Virk healed well    Rash from itch mites reassurance clobetasol as needed    AK's of the brows and temples nose  Actinic keratoses.  Precancerous nature discussed.  Treatment options reviewed at length we will proceed with topical therapy with   Aldara   twice weekly for   6   week course  of actual medication use and may alternate weeks if necessary.  Increased redness scaling crusting irritation pain tenderness potential discussed.  Anticipate one month for resolution of symptoms.  Plan recheck in one month after completion of treatment course.  Consider alternative treatment biopsy if not resolved.    Application instructions for imiquimod.    This comes in small packets. Each packet is designed to cover an area the size of the face/ scalp ( yes, this tiny packet) .  Do not use more than 1 packet per  application.  Although the instructions say to discard once open, the medication is stable for 1-2 weeks if the packet is in a sealed plastic sandwich bag or taped.    You may experience redness, crusting and irritation.  If more medication than necessary is used, you may experience flu like symptoms as well.     Macro dermatitis on back stable scattered patches of eczema      Multiple skin cancers, extensive AK's in the past.  Post Efudex doing better.  Consider repeat course.  Left lateral zygoma, left preauricular cheek BCC post Mohs Dr. Almonte2022  Repeat Mohs left lateral zygoma 2023.  Multiple basal cell carcinomas no recurrence.    Continue careful monitoring no other suspicious lesions no other significant changes in exam.  Encouraged more consistent use of sunscreen while driving given new lesions on the left side      Continue careful sun protection right lateral vertex biopsy benign folliculitis last office visit 6/20/2020    Other areas of prior skin cancer at the scalp clear pinkish patches consistent with noted.  Slightly erythematous anterior portion of central vertex.  Continue careful monitoring.      Postauricular neck probable BCC resolved with Aldara   chest clear.  No recurrence prior BCC.  No other new suspicious lesions  Pinkish patch upper back observe. Possible aldara    Multiple benign keratoses.     Moderate sun damage no other suspicious lesions  Other benign nevi lentigines    Continue careful monitoring patient at high risk for additional skin cancers recheck in 3 to 4 months.    Please refer to map for specific lesions.  See additional diagnoses.  Pros cons of various therapies, risks benefits discussed.Pathophysiology discussed with patient.  Therapeutic options reviewed.  See  Medications in grid.  Instructions reviewed at length.    Benign nevi, seborrheic  keratoses, cherry angiomas:  Reassurance regarding other benign skin lesions.Signs and symptoms of skin cancer, ABCDE's of  melanoma discussed with patient. Sunscreen use, sun protection, self exams reviewed.  Followup as noted RTC routine checkup approximately 4 months or p.r.n.    Encounter Times  Including precharting, reviewing chart, prior notes obtaining history: 5 minutes, medical exam :10 minutes, notes on body map, plan, counseling 10minutes My total time spent caring for the patient on the day of the encounter: 25 minutes       The patient indicates understanding of these issues and agrees to the plan.  The patient is asked to return as noted in follow-up/ above.    This note was generated using Dragon voice recognition software.  Please contact me regarding any confusion resulting from errors in recognition.   Note to patient and family: The 21st Century Cures Act makes medical notes like these available to patients. However, be advised this is a medical document. It is intended as [id number] communication and monitoring of a patient's care needs. It is written in medical language and may contain abbreviations or verbiage that are unfamiliar. It may appear blunt or direct. Medical documents are intended to carry relevant information, facts as evident and the clinical opinion of the practitioner.

## 2024-09-30 NOTE — PATIENT INSTRUCTIONS
Application instructions for imiquimod.    This comes in small packets. Each packet is designed to cover an area the size of the face/ scalp ( yes, this tiny packet) .  Do not use more than 1 packet per application.  Although the instructions say to discard once open, the medication is stable for 1-2 weeks if the packet is in a sealed plastic sandwich bag or taped.    You may experience redness, crusting and irritation.  If more medication than necessary is used, you may experience flu like symptoms as well.

## 2024-10-21 ENCOUNTER — NURSE TRIAGE (OUTPATIENT)
Dept: INTERNAL MEDICINE CLINIC | Facility: CLINIC | Age: 69
End: 2024-10-21

## 2024-10-21 NOTE — TELEPHONE ENCOUNTER
Action Requested: Summary for Provider     []  Critical Lab, Recommendations Needed  [] Need Additional Advice  []   FYI    []   Need Orders  [] Need Medications Sent to Pharmacy  []  Other     SUMMARY: Per protocol, patient should be seen  in office within 2 weeks.     Future Appointments   Date Time Provider Department Center   10/22/2024  2:30 PM Stan Martin MD [facility]       Reason for call: Hypertension  Onset: Data Unavailable    Spoke to patient. His blood pressure has been running 140's/90's. If he sits there, it will go down to 120's/80's. He is on blood pressure medication but has been doubling the dose until he sees Dr. Martin tomorrow.     Appointment ok to keep tomorrow. Blood pressure is not acute.     Reason for Disposition   Systolic BP >= 130 OR Diastolic >= 80, and is taking BP medications    Protocols used: Blood Pressure - High-A-OH

## 2024-10-21 NOTE — TELEPHONE ENCOUNTER
Patient scheduled an appointment via [facility] for the following concern:    My blood pressure is too high for the past few weeks

## 2024-10-22 ENCOUNTER — OFFICE VISIT (OUTPATIENT)
Dept: INTERNAL MEDICINE CLINIC | Facility: CLINIC | Age: 69
End: 2024-10-22
Payer: MEDICARE

## 2024-10-22 VITALS
HEIGHT: 70 IN | OXYGEN SATURATION: 98 % | BODY MASS INDEX: 31.78 KG/M2 | SYSTOLIC BLOOD PRESSURE: 103 MMHG | DIASTOLIC BLOOD PRESSURE: 62 MMHG | WEIGHT: 222 LBS | HEART RATE: 86 BPM

## 2024-10-22 DIAGNOSIS — I10 ESSENTIAL HYPERTENSION: Primary | ICD-10-CM

## 2024-10-22 PROCEDURE — G2211 COMPLEX E/M VISIT ADD ON: HCPCS | Performed by: INTERNAL MEDICINE

## 2024-10-22 PROCEDURE — 3074F SYST BP LT 130 MM HG: CPT | Performed by: INTERNAL MEDICINE

## 2024-10-22 PROCEDURE — 1126F AMNT PAIN NOTED NONE PRSNT: CPT | Performed by: INTERNAL MEDICINE

## 2024-10-22 PROCEDURE — 3008F BODY MASS INDEX DOCD: CPT | Performed by: INTERNAL MEDICINE

## 2024-10-22 PROCEDURE — 3078F DIAST BP <80 MM HG: CPT | Performed by: INTERNAL MEDICINE

## 2024-10-22 PROCEDURE — 1159F MED LIST DOCD IN RCRD: CPT | Performed by: INTERNAL MEDICINE

## 2024-10-22 PROCEDURE — 99213 OFFICE O/P EST LOW 20 MIN: CPT | Performed by: INTERNAL MEDICINE

## 2024-10-22 PROCEDURE — 1160F RVW MEDS BY RX/DR IN RCRD: CPT | Performed by: INTERNAL MEDICINE

## 2024-10-22 NOTE — PROGRESS NOTES
Anish Simental is a 69 year old male.   Chief Complaint   Patient presents with    Hypertension     F/u     HPI:   Anish presents this afternoon with concerns about elevated blood pressure.    He has been checking his blood pressure regularly and readings recently have been higher, 130-150s/80-90s.  For the past 1 week, he has been taking 2 lisinopril/HCTZ tablets daily.  He feels well.  No headaches.  No lightheadedness or dizziness.  No palpitations or chest pain.  No shortness of breath.  Current Outpatient Medications   Medication Sig Dispense Refill    atorvastatin 10 MG Oral Tab Take 1 tablet (10 mg total) by mouth nightly. 90 tablet 1    Lansoprazole 15 MG Oral Capsule Delayed Release Take 1 capsule (15 mg total) by mouth daily. 90 capsule 3    lisinopril-hydroCHLOROthiazide 10-12.5 MG Oral Tab Take 1 tablet by mouth daily. 90 tablet 3    tadalafil 5 MG Oral Tab Take 1 tablet (5 mg total) by mouth daily as needed for Erectile Dysfunction. 90 tablet 3     Allergies[1]   Past Medical History:    Basal cell carcinoma    left lower cheek    Basal cell carcinoma    left lower chest    BCC (basal cell carcinoma of skin)    right alar rim    BCC (basal cell carcinoma of skin)    left vertex scalp    BCC (basal cell carcinoma)    left lateral zygoma    BCC (basal cell carcinoma)    left preauricular cheek    BCC (basal cell carcinoma)    left lateral zygoma    BPH (benign prostatic hyperplasia)    Erectile dysfunction    Essential hypertension    Folliculitis    right lateral vertex - chronic folliculitis    GERD (gastroesophageal reflux disease)    Hearing impairment    both ears    Hypercholesterolemia    Recurrent skin cancer    Scoliosis    Not sure this condition exists     Past Surgical History:   Procedure Laterality Date    Elbow surgery Right 1971    Hernia surgery  2023    Inguinal hernia repair Right 03/20/2024    Other  09/2008    Excision BCCa left temporal scalp    Other  11/2008    Mohs  surgery BCCa hairline    Other  2016    Excision BCCa upper back    Other  2017    Excision BCCa left parietal scalp    Other  2017    Excision BCCa left lower cheek and left central chest    Other  2019    Cystoscopy, meatotomy, urethral dilatation    Other surgical history Left 2024    Excision of left subcutaneous facial epidermoid cyst with primary closure.    Skin surgery      4 or 5 basal cell surgeries since       Social History:  Social History     Socioeconomic History    Marital status:    Tobacco Use    Smoking status: Former     Current packs/day: 0.00     Average packs/day: 1 pack/day for 35.0 years (35.0 ttl pk-yrs)     Types: Cigarettes     Start date: 1987     Quit date: 2022     Years since quittin.7     Passive exposure: Never    Smokeless tobacco: Never   Vaping Use    Vaping status: Every Day   Substance and Sexual Activity    Alcohol use: Yes     Alcohol/week: 16.0 standard drinks of alcohol     Types: 8 Glasses of wine, 8 Cans of beer per week     Comment: Pause use due to surgery    Drug use: No    Sexual activity: Not Currently     Partners: Female   Other Topics Concern    Caffeine Concern Yes     Comment: coffee, 1 cup/day    Grew up on a farm Yes    History of tanning No    Outdoor occupation No    Pt has a pacemaker No    Pt has a defibrillator No    Reaction to local anesthetic No        EXAM:   GENERAL: Pleasant male appearing well in no distress  /62 (BP Location: Right arm, Patient Position: Sitting, Cuff Size: adult)   Pulse 86   Ht 5' 10\" (1.778 m)   Wt 222 lb (100.7 kg)   SpO2 98%   BMI 31.85 kg/m² Upon recheck by me BP 98/60 right arm and 102/58 left arm seated  LUNGS: Resonant to percussion and clear to auscultation  CARDIAC: Rhythm regular S1 S2 normal without murmur   ABDOMEN: Bowel sounds normal soft nontender       ASSESSMENT AND PLAN:   1. Essential hypertension  BP low normal today.  Suspect faulty home BP  monitor  Recommend he hold lisinopril/HCTZ for 2 days, then resume 1 tablet daily  Follow-up visit in 3 months      The patient indicates understanding of these issues and agrees to the plan.  The patient is asked to return in 3 months.    Stan Martin MD  10/22/2024  2:39 PM         [1]   Allergies  Allergen Reactions    Penicillins FACE FLUSHING     No skin peeling or blisters.

## 2024-10-22 NOTE — PATIENT INSTRUCTIONS
Today your blood pressure was low at 98/60  Please stop lisinopril/HCTZ for the next 2 days then resume taking 1 tablet daily  Return visit in 3 months

## 2024-11-18 RX ORDER — TADALAFIL 5 MG/1
5 TABLET ORAL DAILY PRN
Qty: 90 TABLET | Refills: 0 | OUTPATIENT
Start: 2024-11-18

## 2024-11-18 NOTE — TELEPHONE ENCOUNTER
Called patient, offered an earlier appointment, patient schedule for 11/26/2024 at 6:50am, patient states he has enough medication until then.

## 2024-11-18 NOTE — TELEPHONE ENCOUNTER
Patient has scheduled next available on 2/4/25, patient asking if he will get refill until then ? Please advise thank you.

## 2024-11-26 ENCOUNTER — OFFICE VISIT (OUTPATIENT)
Dept: SURGERY | Facility: CLINIC | Age: 69
End: 2024-11-26
Payer: MEDICARE

## 2024-11-26 DIAGNOSIS — N13.8 BPH WITH OBSTRUCTION/LOWER URINARY TRACT SYMPTOMS: Primary | ICD-10-CM

## 2024-11-26 DIAGNOSIS — N52.8 OTHER MALE ERECTILE DYSFUNCTION: ICD-10-CM

## 2024-11-26 DIAGNOSIS — N40.1 BPH WITH OBSTRUCTION/LOWER URINARY TRACT SYMPTOMS: Primary | ICD-10-CM

## 2024-11-26 PROBLEM — J41.0 SMOKERS' COUGH (HCC): Chronic | Status: ACTIVE | Noted: 2024-11-26

## 2024-11-26 PROCEDURE — 99213 OFFICE O/P EST LOW 20 MIN: CPT | Performed by: UROLOGY

## 2024-11-26 RX ORDER — TADALAFIL 5 MG/1
5 TABLET ORAL
Qty: 90 TABLET | Refills: 3 | Status: SHIPPED | OUTPATIENT
Start: 2024-11-26

## 2024-11-26 NOTE — PROGRESS NOTES
Anish Simental is a 69 year old male.    HPI:     Chief Complaint   Patient presents with    Follow - Up     Refill for tadalafil 5mg. Pt continues to do CIC 3-4x weekly.       69-year-old male with a history of BPH, meatal stenosis, erectile dysfunction currently on tadalafil 5 mg daily.  Had a cystoscopy and dilation in 2019.  Uses a 16 Austrian straight catheter to dilate his distal meatus 3 times a week.  No reported complaints or issues.  No gross hematuria or dysuria.  PSA continues to be stable April 4, 2024 1.39.      HISTORY:  Past Medical History:    Basal cell carcinoma    left lower cheek    Basal cell carcinoma    left lower chest    BCC (basal cell carcinoma of skin)    right alar rim    BCC (basal cell carcinoma of skin)    left vertex scalp    BCC (basal cell carcinoma)    left lateral zygoma    BCC (basal cell carcinoma)    left preauricular cheek    BCC (basal cell carcinoma)    left lateral zygoma    BPH (benign prostatic hyperplasia)    Erectile dysfunction    Essential hypertension    Folliculitis    right lateral vertex - chronic folliculitis    GERD (gastroesophageal reflux disease)    Hearing impairment    both ears    Hypercholesterolemia    Recurrent skin cancer    Scoliosis    Not sure this condition exists      Past Surgical History:   Procedure Laterality Date    Elbow surgery Right 1971    Hernia surgery  2023    Inguinal hernia repair Right 03/20/2024    Other  09/2008    Excision BCCa left temporal scalp    Other  11/2008    Mohs surgery BCCa hairline    Other  07/2016    Excision BCCa upper back    Other  08/2017    Excision BCCa left parietal scalp    Other  12/2017    Excision BCCa left lower cheek and left central chest    Other  07/2019    Cystoscopy, meatotomy, urethral dilatation    Other surgical history Left 05/06/2024    Excision of left subcutaneous facial epidermoid cyst with primary closure.    Skin surgery      4 or 5 basal cell surgeries since 2010      Family  History   Problem Relation Age of Onset    Heart Disease Father          MI age 77    Other (ALS) Father     Heart Disease Paternal Uncle          MI age 60s    Diabetes Maternal Grandfather     Cancer Maternal Grandfather     Other (Alzheimers) Mother     Dementia Mother         Started around age 75    Depression Mother     Cancer Maternal Grandfather         Details unknown    Other (Glioblastoma) Sister     Cancer Sister         Brain cancer    Psychiatric Sister         Schizophrenia    Depression Brother     Psychiatric Daughter         Schizophrenia      Social History:   Social History     Socioeconomic History    Marital status:    Tobacco Use    Smoking status: Former     Current packs/day: 0.00     Average packs/day: 1 pack/day for 35.0 years (35.0 ttl pk-yrs)     Types: Cigarettes     Start date: 1987     Quit date: 2022     Years since quittin.8     Passive exposure: Never    Smokeless tobacco: Never   Vaping Use    Vaping status: Every Day   Substance and Sexual Activity    Alcohol use: Yes     Alcohol/week: 16.0 standard drinks of alcohol     Types: 8 Glasses of wine, 8 Cans of beer per week     Comment: Pause use due to surgery    Drug use: No    Sexual activity: Not Currently     Partners: Female   Other Topics Concern    Caffeine Concern Yes     Comment: coffee, 1 cup/day    Grew up on a farm Yes    History of tanning No    Outdoor occupation No    Pt has a pacemaker No    Pt has a defibrillator No    Reaction to local anesthetic No        Medications (Active prior to today's visit):  Current Outpatient Medications   Medication Sig Dispense Refill    tadalafil 5 MG Oral Tab Take 1 tablet (5 mg total) by mouth daily as needed for Erectile Dysfunction. 90 tablet 3    atorvastatin 10 MG Oral Tab Take 1 tablet (10 mg total) by mouth nightly. 90 tablet 1    Lansoprazole 15 MG Oral Capsule Delayed Release Take 1 capsule (15 mg total) by mouth daily. 90 capsule 3     lisinopril-hydroCHLOROthiazide 10-12.5 MG Oral Tab Take 1 tablet by mouth daily. 90 tablet 3       Allergies:  Allergies[1]      ROS:       PHYSICAL EXAM:        ASSESSMENT/PLAN:   Assessment   Encounter Diagnoses   Name Primary?    BPH with obstruction/lower urinary tract symptoms Yes    Other male erectile dysfunction        Recommend:  - Refilled his prescription for tadalafil 5 mg daily.  - Continue to perform dilations using a 16 French straight catheter 3 times a week.  - Follow-up in 1 year.  Digital prostate exam and PSA at that time.         Orders This Visit:  No orders of the defined types were placed in this encounter.      Meds This Visit:  Requested Prescriptions     Signed Prescriptions Disp Refills    tadalafil 5 MG Oral Tab 90 tablet 3     Sig: Take 1 tablet (5 mg total) by mouth daily as needed for Erectile Dysfunction.       Imaging & Referrals:  None     11/26/2024  Pam Tesfaye MD               [1]   Allergies  Allergen Reactions    Penicillins FACE FLUSHING     No skin peeling or blisters.

## 2024-11-30 DIAGNOSIS — N13.8 BPH WITH OBSTRUCTION/LOWER URINARY TRACT SYMPTOMS: ICD-10-CM

## 2024-11-30 DIAGNOSIS — N40.1 BPH WITH OBSTRUCTION/LOWER URINARY TRACT SYMPTOMS: ICD-10-CM

## 2024-11-30 DIAGNOSIS — N52.8 OTHER MALE ERECTILE DYSFUNCTION: ICD-10-CM

## 2024-12-03 ENCOUNTER — TELEPHONE (OUTPATIENT)
Dept: SURGERY | Facility: CLINIC | Age: 69
End: 2024-12-03

## 2024-12-03 RX ORDER — TADALAFIL 5 MG/1
5 TABLET ORAL DAILY PRN
Qty: 90 TABLET | Refills: 0 | OUTPATIENT
Start: 2024-12-03

## 2024-12-03 NOTE — TELEPHONE ENCOUNTER
Called patient and notified we released Tadalafil to his pharmacy today. Patient verbalized understandings.

## 2024-12-03 NOTE — TELEPHONE ENCOUNTER
Patient states [facility] does not have the tadalafil script and is asking for it to be sent today. Please advise.

## 2025-02-06 ENCOUNTER — NURSE TRIAGE (OUTPATIENT)
Dept: INTERNAL MEDICINE CLINIC | Facility: CLINIC | Age: 70
End: 2025-02-06

## 2025-02-06 NOTE — TELEPHONE ENCOUNTER
Patient called back, took recheck of blood pressure, got 122/82 and 135/79.  Advised those are acceptable readings.  He should continue to monitor, call if readings exceed 140/90 or if any headache.  He stated understanding, and will try to make a sooner appointment through [facility].  He stated understanding.

## 2025-02-06 NOTE — TELEPHONE ENCOUNTER
Action Requested: Summary for Provider     []  Critical Lab, Recommendations Needed  [] Need Additional Advice  []   FYI    []   Need Orders  [] Need Medications Sent to Pharmacy  []  Other     SUMMARY:Pt stated he normally don't check his BP but did this morning-alarmingly high to him 150/90, asymptomatic, took BP med an hr ago, advised to recheck in 1 hr and call back ,pt verbalized understanding     Reason for call: Blood Pressure  Onset: today                   Reason for Disposition   Systolic BP < 130 with Diastolic < 80, and taking BP medications    Protocols used: Blood Pressure - High-A-OH

## 2025-03-13 NOTE — TELEPHONE ENCOUNTER
Current Outpatient Medications:       lisinopril-hydroCHLOROthiazide 10-12.5 MG Oral Tab, Take 1 tablet by mouth daily., Disp: 90 tablet, Rfl: 3

## 2025-03-14 NOTE — TELEPHONE ENCOUNTER
Current Outpatient Medications:       atorvastatin 10 MG Oral Tab, Take 1 tablet (10 mg total) by mouth nightly., Disp: 90 tablet, Rfl: 1

## 2025-03-17 RX ORDER — LISINOPRIL AND HYDROCHLOROTHIAZIDE 10; 12.5 MG/1; MG/1
1 TABLET ORAL DAILY
Qty: 90 TABLET | Refills: 3 | Status: SHIPPED | OUTPATIENT
Start: 2025-03-17

## 2025-03-18 RX ORDER — ATORVASTATIN CALCIUM 10 MG/1
10 TABLET, FILM COATED ORAL NIGHTLY
Qty: 90 TABLET | Refills: 0 | Status: SHIPPED | OUTPATIENT
Start: 2025-03-18

## 2025-04-02 ENCOUNTER — OFFICE VISIT (OUTPATIENT)
Dept: DERMATOLOGY CLINIC | Facility: CLINIC | Age: 70
End: 2025-04-02
Payer: MEDICARE

## 2025-04-02 DIAGNOSIS — L57.0 AK (ACTINIC KERATOSIS): ICD-10-CM

## 2025-04-02 DIAGNOSIS — D48.5 NEOPLASM OF UNCERTAIN BEHAVIOR OF SKIN: Primary | ICD-10-CM

## 2025-04-02 DIAGNOSIS — D23.9 BENIGN NEOPLASM OF SKIN, UNSPECIFIED LOCATION: ICD-10-CM

## 2025-04-02 DIAGNOSIS — L30.9 DERMATITIS: ICD-10-CM

## 2025-04-02 DIAGNOSIS — Z85.828 ENCOUNTER FOR FOLLOW-UP SURVEILLANCE OF SKIN CANCER: ICD-10-CM

## 2025-04-02 DIAGNOSIS — Z08 ENCOUNTER FOR FOLLOW-UP SURVEILLANCE OF SKIN CANCER: ICD-10-CM

## 2025-04-02 DIAGNOSIS — L82.1 SEBORRHEIC KERATOSES: ICD-10-CM

## 2025-04-02 DIAGNOSIS — L81.4 LENTIGO: ICD-10-CM

## 2025-04-02 DIAGNOSIS — L72.0 EPIDERMAL CYST: ICD-10-CM

## 2025-04-02 PROCEDURE — 1159F MED LIST DOCD IN RCRD: CPT | Performed by: DERMATOLOGY

## 2025-04-02 PROCEDURE — 11103 TANGNTL BX SKIN EA SEP/ADDL: CPT | Performed by: DERMATOLOGY

## 2025-04-02 PROCEDURE — 88305 TISSUE EXAM BY PATHOLOGIST: CPT | Performed by: DERMATOLOGY

## 2025-04-02 PROCEDURE — 1160F RVW MEDS BY RX/DR IN RCRD: CPT | Performed by: DERMATOLOGY

## 2025-04-02 PROCEDURE — 11102 TANGNTL BX SKIN SINGLE LES: CPT | Performed by: DERMATOLOGY

## 2025-04-02 PROCEDURE — 99213 OFFICE O/P EST LOW 20 MIN: CPT | Performed by: DERMATOLOGY

## 2025-04-02 NOTE — PROGRESS NOTES
The following individual(s) verbally consented to be recorded using ambient AI listening technology and understand that they can each withdraw their consent to this listening technology at any point by asking the clinician to turn off or pause the recording:    Patient name: Anish Rivasrick Kamille

## 2025-04-03 ENCOUNTER — OFFICE VISIT (OUTPATIENT)
Dept: INTERNAL MEDICINE CLINIC | Facility: CLINIC | Age: 70
End: 2025-04-03
Payer: MEDICARE

## 2025-04-03 ENCOUNTER — LAB ENCOUNTER (OUTPATIENT)
Dept: LAB | Age: 70
End: 2025-04-03
Attending: INTERNAL MEDICINE
Payer: MEDICARE

## 2025-04-03 VITALS
DIASTOLIC BLOOD PRESSURE: 80 MMHG | HEART RATE: 88 BPM | HEIGHT: 70 IN | TEMPERATURE: 97 F | BODY MASS INDEX: 33.24 KG/M2 | WEIGHT: 232.19 LBS | RESPIRATION RATE: 16 BRPM | OXYGEN SATURATION: 98 % | SYSTOLIC BLOOD PRESSURE: 130 MMHG

## 2025-04-03 DIAGNOSIS — N40.1 BPH ASSOCIATED WITH NOCTURIA: ICD-10-CM

## 2025-04-03 DIAGNOSIS — Z00.00 ENCOUNTER FOR ANNUAL HEALTH EXAMINATION: Primary | ICD-10-CM

## 2025-04-03 DIAGNOSIS — Z12.11 COLON CANCER SCREENING: ICD-10-CM

## 2025-04-03 DIAGNOSIS — E66.09 CLASS 1 OBESITY DUE TO EXCESS CALORIES WITHOUT SERIOUS COMORBIDITY WITH BODY MASS INDEX (BMI) OF 33.0 TO 33.9 IN ADULT: ICD-10-CM

## 2025-04-03 DIAGNOSIS — E78.00 HYPERCHOLESTEROLEMIA: ICD-10-CM

## 2025-04-03 DIAGNOSIS — R35.1 BPH ASSOCIATED WITH NOCTURIA: ICD-10-CM

## 2025-04-03 DIAGNOSIS — Q64.32 CONGENITAL STRICTURE OF URETHRA: ICD-10-CM

## 2025-04-03 DIAGNOSIS — E66.811 CLASS 1 OBESITY DUE TO EXCESS CALORIES WITHOUT SERIOUS COMORBIDITY WITH BODY MASS INDEX (BMI) OF 33.0 TO 33.9 IN ADULT: ICD-10-CM

## 2025-04-03 DIAGNOSIS — I10 PRIMARY HYPERTENSION: ICD-10-CM

## 2025-04-03 DIAGNOSIS — C44.310 BASAL CELL CARCINOMA (BCC) OF SKIN OF FACE, UNSPECIFIED PART OF FACE: ICD-10-CM

## 2025-04-03 DIAGNOSIS — K21.9 GASTROESOPHAGEAL REFLUX DISEASE, UNSPECIFIED WHETHER ESOPHAGITIS PRESENT: ICD-10-CM

## 2025-04-03 DIAGNOSIS — N52.9 ERECTILE DYSFUNCTION, UNSPECIFIED ERECTILE DYSFUNCTION TYPE: ICD-10-CM

## 2025-04-03 DIAGNOSIS — Z00.00 ENCOUNTER FOR ANNUAL HEALTH EXAMINATION: ICD-10-CM

## 2025-04-03 DIAGNOSIS — Z63.4 BEREAVEMENT: ICD-10-CM

## 2025-04-03 PROBLEM — K40.90 NON-RECURRENT UNILATERAL INGUINAL HERNIA WITHOUT OBSTRUCTION OR GANGRENE: Status: RESOLVED | Noted: 2024-03-13 | Resolved: 2025-04-03

## 2025-04-03 PROBLEM — J41.0 SMOKERS' COUGH (HCC): Chronic | Status: RESOLVED | Noted: 2024-11-26 | Resolved: 2025-04-03

## 2025-04-03 LAB
ALBUMIN SERPL-MCNC: 4.2 G/DL (ref 3.2–4.8)
ALBUMIN/GLOB SERPL: 1.7 {RATIO} (ref 1–2)
ALP LIVER SERPL-CCNC: 94 U/L
ALT SERPL-CCNC: 26 U/L
ANION GAP SERPL CALC-SCNC: 10 MMOL/L (ref 0–18)
AST SERPL-CCNC: 23 U/L (ref ?–34)
BASOPHILS # BLD AUTO: 0.05 X10(3) UL (ref 0–0.2)
BASOPHILS NFR BLD AUTO: 1.1 %
BILIRUB SERPL-MCNC: 0.8 MG/DL (ref 0.2–1.1)
BUN BLD-MCNC: 22 MG/DL (ref 9–23)
BUN/CREAT SERPL: 22.9 (ref 10–20)
CALCIUM BLD-MCNC: 9 MG/DL (ref 8.7–10.4)
CHLORIDE SERPL-SCNC: 106 MMOL/L (ref 98–112)
CHOLEST SERPL-MCNC: 142 MG/DL (ref ?–200)
CO2 SERPL-SCNC: 26 MMOL/L (ref 21–32)
CREAT BLD-MCNC: 0.96 MG/DL
CREAT UR-SCNC: 85.6 MG/DL
DEPRECATED RDW RBC AUTO: 42.6 FL (ref 35.1–46.3)
EGFRCR SERPLBLD CKD-EPI 2021: 86 ML/MIN/1.73M2 (ref 60–?)
EOSINOPHIL # BLD AUTO: 0.07 X10(3) UL (ref 0–0.7)
EOSINOPHIL NFR BLD AUTO: 1.6 %
ERYTHROCYTE [DISTWIDTH] IN BLOOD BY AUTOMATED COUNT: 13.2 % (ref 11–15)
EST. AVERAGE GLUCOSE BLD GHB EST-MCNC: 108 MG/DL (ref 68–126)
FASTING PATIENT LIPID ANSWER: YES
FASTING STATUS PATIENT QL REPORTED: YES
GLOBULIN PLAS-MCNC: 2.5 G/DL (ref 2–3.5)
GLUCOSE BLD-MCNC: 96 MG/DL (ref 70–99)
HBA1C MFR BLD: 5.4 % (ref ?–5.7)
HCT VFR BLD AUTO: 42.7 %
HDLC SERPL-MCNC: 78 MG/DL (ref 40–59)
HGB BLD-MCNC: 14.1 G/DL
IMM GRANULOCYTES # BLD AUTO: 0.01 X10(3) UL (ref 0–1)
IMM GRANULOCYTES NFR BLD: 0.2 %
LDLC SERPL CALC-MCNC: 53 MG/DL (ref ?–100)
LYMPHOCYTES # BLD AUTO: 1.04 X10(3) UL (ref 1–4)
LYMPHOCYTES NFR BLD AUTO: 23.7 %
MCH RBC QN AUTO: 29 PG (ref 26–34)
MCHC RBC AUTO-ENTMCNC: 33 G/DL (ref 31–37)
MCV RBC AUTO: 87.7 FL
MICROALBUMIN UR-MCNC: <0.3 MG/DL
MONOCYTES # BLD AUTO: 0.33 X10(3) UL (ref 0.1–1)
MONOCYTES NFR BLD AUTO: 7.5 %
NEUTROPHILS # BLD AUTO: 2.89 X10 (3) UL (ref 1.5–7.7)
NEUTROPHILS # BLD AUTO: 2.89 X10(3) UL (ref 1.5–7.7)
NEUTROPHILS NFR BLD AUTO: 65.9 %
NONHDLC SERPL-MCNC: 64 MG/DL (ref ?–130)
OSMOLALITY SERPL CALC.SUM OF ELEC: 297 MOSM/KG (ref 275–295)
PLATELET # BLD AUTO: 256 10(3)UL (ref 150–450)
POTASSIUM SERPL-SCNC: 4.1 MMOL/L (ref 3.5–5.1)
PROT SERPL-MCNC: 6.7 G/DL (ref 5.7–8.2)
PSA SERPL-MCNC: 1.51 NG/ML (ref ?–4)
RBC # BLD AUTO: 4.87 X10(6)UL
SODIUM SERPL-SCNC: 142 MMOL/L (ref 136–145)
TRIGL SERPL-MCNC: 52 MG/DL (ref 30–149)
TSI SER-ACNC: 1.11 UIU/ML (ref 0.55–4.78)
VLDLC SERPL CALC-MCNC: 7 MG/DL (ref 0–30)
WBC # BLD AUTO: 4.4 X10(3) UL (ref 4–11)

## 2025-04-03 PROCEDURE — 82043 UR ALBUMIN QUANTITATIVE: CPT

## 2025-04-03 PROCEDURE — 84443 ASSAY THYROID STIM HORMONE: CPT

## 2025-04-03 PROCEDURE — 83036 HEMOGLOBIN GLYCOSYLATED A1C: CPT

## 2025-04-03 PROCEDURE — 80061 LIPID PANEL: CPT

## 2025-04-03 PROCEDURE — 85025 COMPLETE CBC W/AUTO DIFF WBC: CPT

## 2025-04-03 PROCEDURE — 80053 COMPREHEN METABOLIC PANEL: CPT

## 2025-04-03 PROCEDURE — 36415 COLL VENOUS BLD VENIPUNCTURE: CPT

## 2025-04-03 PROCEDURE — 82570 ASSAY OF URINE CREATININE: CPT

## 2025-04-03 PROCEDURE — 84153 ASSAY OF PSA TOTAL: CPT

## 2025-04-03 SDOH — SOCIAL STABILITY - SOCIAL INSECURITY: DISSAPEARANCE AND DEATH OF FAMILY MEMBER: Z63.4

## 2025-04-03 NOTE — PROGRESS NOTES
The following individual(s), Anish Simental, verbally consents to be recorded using ambient AI listening technology and understand that they can each withdraw their consent to this listening technology at any point by asking the clinician to turn off or pause the recording

## 2025-04-03 NOTE — PATIENT INSTRUCTIONS
Prevention Guidelines, Men Ages 65 and Older  Screening tests and vaccines are an important part of managing your health.A screening test is done to find possible disorders or diseases in people who don't have any symptoms. The goal is to find a disease early so lifestyle changes can be made and you can be watched more closely to reduce the risk of disease, or to detect it early enough to treat it most effectively. Screening tests are not considered diagnostic, but are used to determine if more testing is needed.  Health counseling is essential, too. Below are guidelines for these, for men ages 65 and older. Talk with your healthcare provider to make sure you’re up-to-date on what you need.  Screening Who needs it How often   Abdominal aortic aneurysm Men ages 65 to 75 who have ever smoked 1 ultrasound   Alcohol misuse All men in this age group At routine exams   Blood pressure All men in this age group Yearly checkup if your blood pressure is normal  Normal blood pressure is less than 120/80 mm Hg  If your blood pressure reading is higher than normal, follow the advice of your healthcare provider   Colorectal cancer All men in this age group Flexible sigmoidoscopy every 5 years, or colonoscopy every 10 years, or double-contrast barium enema every 5 years; yearly fecal occult blood test or fecal immunochemical test; or a stool DNA test as often as your healthcare provider advises; talk with your healthcare provider about which tests are best for you and when you no longer need colonoscopies (generally after age 75)   Depression All men in this age group At routine exams   Type 2 diabetes or prediabetes All men beginning at age 45 and men without symptoms at any age who are overweight or obese and have 1 or more other risk factors for diabetes At least every 3 years (yearly if your blood sugar has already begun to rise)   Type 2 diabetes All men with prediabetes Every year   Hepatitis C Men at increased risk for  infection - talk with your healthcare provider At routine exams   High cholesterol or triglycerides All men in this age group At least every 5 years   HIV Men at increased risk for infection - talk with your healthcare provider At routine exams   Lung cancer Adults ages 55 to 80 who have smoked Yearly screening in smokers with 30 pack-year history of smoking or who quit within 15 years   Obesity All men in this age group At routine exams   Prostate cancer All men in this age group, talk to healthcare provider about risks and benefits of digital rectal exam (CHRISTIAN) and prostate-specific antigen (PSA) screening1 At routine exams   Syphilis Men at increased risk for infection - talk with your healthcare provider At routine exams   Tuberculosis Men at increased risk for infection - talk with your healthcare provider Ask your healthcare provider   Vision All men in this age group Every 1 to 2 years; if you have a chronic health condition, ask your healthcare provider if you needs exams more often   Vaccine Who needs it How often   Chickenpox (varicella) All men in this age group who have no record of this infection or vaccine 2 doses; second dose should be given at least 4 weeks after the first dose   Hepatitis A Men at increased risk for infection - talk with your healthcare provider 2 doses given at least 6 months apart   Hepatitis B Men at increased risk for infection - talk with your healthcare provider 3 doses over 6 months; second dose should be given 1 month after the first dose; the third dose should be given at least 2 months after the second dose and at least 4 months after the first dose   Haemophilus influenzae Type B (HIB) Men at increased risk for infection - talk with your healthcare provider 1 to 3 doses   Influenza (flu) All men in this age group  Once a year   Meningococcal Men at increased risk for infection - talk with your healthcare provider 1 or more doses   Pneumococcal conjugate vaccine (PCV13) and  pneumococcal polysaccharide vaccine (PPSV23) All men in this age group 1 dose of each vaccine   Tetanus/diphtheria/  pertussis (Td/Tdap) booster All men in this age group Td every 10 years, or Tdap if you will have contact with a child younger than 12 months old   Zoster All men in this age group 1 dose   Counseling Who needs it How often   Diet and exercise Men who are overweight or obese When diagnosed, and then at routine exams   Fall prevention (exercise, vitamin D supplements) All men in this age group At routine exams   Sexually transmitted infection Men at increased risk for infection - talk with your healthcare provider At routine exams   Use of daily aspirin Men ages 45 to 79 at risk for cardiovascular health problems At routine exams   Use of tobacco and the health effects it can cause All men in this age group Every visit   [address] [place] [address] [facility]   Date Last Reviewed: 2/1/2017  © 8378-7878 [employer]. [address] [place] [address]. All rights reserved. This information is not intended as a substitute for professional medical care. Always follow your healthcare professional's instructions.

## 2025-04-04 ENCOUNTER — LAB ENCOUNTER (OUTPATIENT)
Dept: LAB | Facility: HOSPITAL | Age: 70
End: 2025-04-04
Attending: INTERNAL MEDICINE
Payer: MEDICARE

## 2025-04-04 DIAGNOSIS — Z12.11 COLON CANCER SCREENING: ICD-10-CM

## 2025-04-04 LAB — HEMOCCULT STL QL: NEGATIVE

## 2025-04-04 PROCEDURE — 82274 ASSAY TEST FOR BLOOD FECAL: CPT

## 2025-04-05 NOTE — PROGRESS NOTES
The pathology report from last visit showed  A.  Skin, right anterior vertex, shave biopsy:  -Noduloinfiltrative basal cell carcinoma, extends to deep tissue edge.    B.  Skin, left posterior vertex, shave biopsy:  -Nodular basal cell carcinoma, extends to deep tissue.  Recommend Mohs for both sites Dr. Almonte's office.  Okay to please place referral if necessary plan follow-up in 4 to 6 months continue sun protection  Please log in test results.  Please call patient and inform of results and recommendations.  (Added to history).  Pt to  [facility]     or prn.

## 2025-04-06 NOTE — PROGRESS NOTES
Anish Simental is a 69 year old male.  HPI:     CC:    Chief Complaint   Patient presents with    Lesion     LOV 9/2024. Pt present for assessment of lesion on scalp that he recently noticed. Some dryness. Extensive hx of BCC.          Allergies:  Penicillins    HISTORY:    Past Medical History:    Basal cell carcinoma    left lower cheek    Basal cell carcinoma    left lower chest    BCC (basal cell carcinoma of skin)    right alar rim    BCC (basal cell carcinoma of skin)    left vertex scalp    BCC (basal cell carcinoma)    left lateral zygoma    BCC (basal cell carcinoma)    left preauricular cheek    BCC (basal cell carcinoma)    left lateral zygoma    BCC (basal cell carcinoma), scalp/neck    x2 vertex scalp    BPH (benign prostatic hyperplasia)    Cancer (HCC)    Basal cell carcenoma    Erectile dysfunction    Essential hypertension    Folliculitis    right lateral vertex - chronic folliculitis    GERD (gastroesophageal reflux disease)    Hearing impairment    both ears    Hypercholesterolemia    Obesity    Lost some weight with Ozempic but gaining again since medication was stopped    Recurrent skin cancer    Scoliosis    Not sure this condition exists      Past Surgical History:   Procedure Laterality Date    Elbow surgery Right 1971    Hernia surgery  2023    Inguinal hernia repair Right 03/20/2024    Other  09/2008    Excision BCCa left temporal scalp    Other  11/2008    Mohs surgery BCCa hairline    Other  07/2016    Excision BCCa upper back    Other  08/2017    Excision BCCa left parietal scalp    Other  12/2017    Excision BCCa left lower cheek and left central chest    Other  07/2019    Cystoscopy, meatotomy, urethral dilatation    Other surgical history Left 05/06/2024    Excision of left subcutaneous facial epidermoid cyst with primary closure.    Skin surgery      4 or 5 basal cell surgeries since 2010      Family History   Problem Relation Age of Onset    Heart Disease Father           MI age 77    Other (ALS) Father     Heart Disorder Father         Heart failure due to ALS    Heart Disease Paternal Uncle          MI age 60s    Diabetes Maternal Grandfather     Cancer Maternal Grandfather     Other (Alzheimers) Mother     Dementia Mother         Started around age 75    Depression Mother     Cancer Maternal Grandfather         Details unknown    Other (Glioblastoma) Sister     Cancer Sister         Gioblastoma    Psychiatric Sister         Schizophrenia    Depression Brother     Psychiatric Daughter         Schizophrenia    Obesity Sister     Psychiatric Sister         Schizophrenia      Social History     Socioeconomic History    Marital status:    Tobacco Use    Smoking status: Former     Current packs/day: 0.00     Average packs/day: 1 pack/day for 35.0 years (35.0 ttl pk-yrs)     Types: Cigarettes     Start date: 1987     Quit date: 2022     Years since quitting: 3.2     Passive exposure: Never    Smokeless tobacco: Never    Tobacco comments:     None since    Vaping Use    Vaping status: Former   Substance and Sexual Activity    Alcohol use: Yes     Alcohol/week: 16.0 standard drinks of alcohol     Types: 8 Glasses of wine, 8 Cans of beer per week     Comment: Pause use due to surgery    Drug use: No    Sexual activity: Not Currently     Partners: Female   Other Topics Concern    Caffeine Concern Yes     Comment: coffee, 1 cup/day    Grew up on a farm Yes    History of tanning No    Outdoor occupation No    Pt has a pacemaker No    Pt has a defibrillator No    Reaction to local anesthetic No     Social Drivers of Health     Food Insecurity: No Food Insecurity (4/3/2025)    NCSS - Food Insecurity     Worried About Running Out of Food in the Last Year: No     Ran Out of Food in the Last Year: No   Transportation Needs: No Transportation Needs (4/3/2025)    NCSS - Transportation     Lack of Transportation: No   Housing Stability: Not At Risk (4/3/2025)    NCSS -  Housing/Utilities     Has Housing: Yes     Worried About Losing Housing: No     Unable to Get Utilities: No        Current Outpatient Medications   Medication Sig Dispense Refill    atorvastatin 10 MG Oral Tab Take 1 tablet (10 mg total) by mouth nightly. 90 tablet 0    lisinopril-hydroCHLOROthiazide 10-12.5 MG Oral Tab Take 1 tablet by mouth daily. 90 tablet 3    tadalafil 5 MG Oral Tab Take 1 tablet (5 mg total) by mouth daily as needed for Erectile Dysfunction. 90 tablet 3    Lansoprazole 15 MG Oral Capsule Delayed Release Take 1 capsule (15 mg total) by mouth daily. 90 capsule 3     Allergies:   Allergies   Allergen Reactions    Penicillins FACE FLUSHING     No skin peeling or blisters.        Past Medical History:    Basal cell carcinoma    left lower cheek    Basal cell carcinoma    left lower chest    BCC (basal cell carcinoma of skin)    right alar rim    BCC (basal cell carcinoma of skin)    left vertex scalp    BCC (basal cell carcinoma)    left lateral zygoma    BCC (basal cell carcinoma)    left preauricular cheek    BCC (basal cell carcinoma)    left lateral zygoma    BCC (basal cell carcinoma), scalp/neck    x2 vertex scalp    BPH (benign prostatic hyperplasia)    Cancer (HCC)    Basal cell carcenoma    Erectile dysfunction    Essential hypertension    Folliculitis    right lateral vertex - chronic folliculitis    GERD (gastroesophageal reflux disease)    Hearing impairment    both ears    Hypercholesterolemia    Obesity    Lost some weight with Ozempic but gaining again since medication was stopped    Recurrent skin cancer    Scoliosis    Not sure this condition exists     Past Surgical History:   Procedure Laterality Date    Elbow surgery Right 1971    Hernia surgery  2023    Inguinal hernia repair Right 03/20/2024    Other  09/2008    Excision BCCa left temporal scalp    Other  11/2008    Mohs surgery BCCa hairline    Other  07/2016    Excision BCCa upper back    Other  08/2017    Excision BCCa left  parietal scalp    Other  12/2017    Excision BCCa left lower cheek and left central chest    Other  07/2019    Cystoscopy, meatotomy, urethral dilatation    Other surgical history Left 05/06/2024    Excision of left subcutaneous facial epidermoid cyst with primary closure.    Skin surgery      4 or 5 basal cell surgeries since 2010     Social History     Socioeconomic History    Marital status:      Spouse name: Not on file    Number of children: Not on file    Years of education: Not on file    Highest education level: Not on file   Occupational History    Not on file   Tobacco Use    Smoking status: Former     Current packs/day: 0.00     Average packs/day: 1 pack/day for 35.0 years (35.0 ttl pk-yrs)     Types: Cigarettes     Start date: 1/12/1987     Quit date: 1/12/2022     Years since quitting: 3.2     Passive exposure: Never    Smokeless tobacco: Never    Tobacco comments:     None since 2022   Vaping Use    Vaping status: Former   Substance and Sexual Activity    Alcohol use: Yes     Alcohol/week: 16.0 standard drinks of alcohol     Types: 8 Glasses of wine, 8 Cans of beer per week     Comment: Pause use due to surgery    Drug use: No    Sexual activity: Not Currently     Partners: Female   Other Topics Concern     Service Not Asked    Blood Transfusions Not Asked    Caffeine Concern Yes     Comment: coffee, 1 cup/day    Occupational Exposure Not Asked    Hobby Hazards Not Asked    Sleep Concern Not Asked    Stress Concern Not Asked    Weight Concern Not Asked    Special Diet Not Asked    Back Care Not Asked    Exercise Not Asked    Bike Helmet Not Asked    Seat Belt Not Asked    Self-Exams Not Asked    Grew up on a farm Yes    History of tanning No    Outdoor occupation No    Pt has a pacemaker No    Pt has a defibrillator No    Reaction to local anesthetic No   Social History Narrative    Not on file     Social Drivers of Health     Food Insecurity: No Food Insecurity (4/3/2025)    NCSS - Food  Insecurity     Worried About Running Out of Food in the Last Year: No     Ran Out of Food in the Last Year: No   Transportation Needs: No Transportation Needs (4/3/2025)    NCSS - Transportation     Lack of Transportation: No   Stress: Not on file   Housing Stability: Not At Risk (4/3/2025)    NCSS - Housing/Utilities     Has Housing: Yes     Worried About Losing Housing: No     Unable to Get Utilities: No     Family History   Problem Relation Age of Onset    Heart Disease Father          MI age 77    Other (ALS) Father     Heart Disorder Father         Heart failure due to ALS    Heart Disease Paternal Uncle          MI age 60s    Diabetes Maternal Grandfather     Cancer Maternal Grandfather     Other (Alzheimers) Mother     Dementia Mother         Started around age 75    Depression Mother     Cancer Maternal Grandfather         Details unknown    Other (Glioblastoma) Sister     Cancer Sister         Gioblastoma    Psychiatric Sister         Schizophrenia    Depression Brother     Psychiatric Daughter         Schizophrenia    Obesity Sister     Psychiatric Sister         Schizophrenia       There were no vitals filed for this visit.    HPI:    Chief Complaint   Patient presents with    Lesion     LOV 2024. Pt present for assessment of lesion on scalp that he recently noticed. Some dryness. Extensive hx of BCC.      Follow-up patient with history BCC, AK's, history of imiquimod, ENC for above lesions.     chest recent Mohs for BCC left zygoma, left preauricular cheek Dr. Almonte   generally careful with sunscreen     no recent surgeries.  Has not noted any other lesions of concern    History of Present Illness  Anish Simental is a 69 year old male who presents with a crusty lesion on the scalp.    He has a crusty lesion on his scalp, initially a scab, which has become bumpier. There is also a small bump toward the front of the scalp. He has a history of a similar lesion on the front of his  scalp, previously diagnosed as basal cell carcinoma.    He recently experienced the loss of his daughter, which has been a significant emotional event. Despite this, he is managing by staying active, including playing pickleball regularly. He played pickleball this morning and describes it as an activity that can continue indefinitely as players often want to play 'one more game'.      Note scaly area on back  Patient presents with concerns above.    Past notes/ records and appropriate/relevant lab results including pathology and past body maps reviewed. Updated and new information noted in current visit.     Patient has been in their usual state of health.  History, medications, allergies reviewed as noted.      ROS:  Denies any other systemic complaints.  No new or changeing lesions other than noted above. No fevers, chills, night sweats, unusual sun sensitivity.  No other skin complaints.        History, medications, allergies reviewed as noted.       Physical Examination:     Well-developed well-nourished patient alert oriented in no acute distress.  Exam total-body performed, including scalp, head, neck, face,nails, hair, external eyes, including conjunctival mucosa, eyelids, lips external ears, back, chest,/ breasts, axillae,  abdomen, arms, legs, palms.     Multiple light to medium brown, well marginated, uniformly pigmented, macules and papules 6 mm and less scattered on exam. pigmented lesions examined with dermoscopy benign-appearing patterns.     Waxy tannish keratotic papules scattered, cherry-red vascular papules scattered.    See map today's date for lesions noted .      Otherwise remarkable for lesions as noted on map.  See details of examination  See Assessment /Plan for additional history and physical exam also:    Assessment / plan:    Orders Placed This Encounter   Procedures    Specimen to Pathology, Tissue [[facility] Pt to [facility] lab]       Meds & Refills for this Visit:  Requested Prescriptions       No prescriptions requested or ordered in this encounter         Encounter Diagnoses   Name Primary?    Neoplasm of uncertain behavior of skin Yes    AK (actinic keratosis)     Encounter for follow-up surveillance of skin cancer     Seborrheic keratoses     Benign neoplasm of skin, unspecified location     Lentigo     Dermatitis     Epidermal cyst          See details on map.      Remarkable for:          Physical Exam  SKIN: Crusty lesion on the scalp. Bump on the scalp toward the front. Pinkish area on the scalp toward the center of the crown.    Results        Assessment & Plan  Scalp lesions    Presents with two lesions on the scalp, one previously a scab and now bumpier, located toward the front and center of the crown. Concern for basal cell carcinoma due to history of similar lesions. The pinkish area in the front is hoped to be precancerous rather than malignant.    - Perform biopsy of the scalp lesions to determine if he is precancerous or basal cell carcinoma.    - Discuss potential need for Mohs surgery if lesions are confirmed to be basal cell carcinoma.        Patient seen for follow-up long-term monitoring, treatment of  Skin cancers, multiple, actinic keratoses, dermatitis  Plan of care:  ongoing surveillance, monitoring including regular follow-up due to longer term risk of recurrence, new lesions.  See previous notes.  There is a longitudinal care relationship with me, the care plan reflects the ongoing nature of the continuous relationship of care, and the medical record indicates that there is ongoing treatment of a serious/complex medical condition which I am currently managing.  is Applicable                     A: vertex- anterior,right pearly papule 6mmr/o BCC B:left posterior vertex - 1cm pearly nodule,   Shave/ tangential biopsy performed, operative note and consent in chart further plans pending pathology        Post excision epidermal inclusion cyst left preauricular at prior incision  site excised by Dr. Luna Virk healed well    Rash from itch mites reassurance clobetasol as needed    AK's of the brows and temples nose  Improved post imiquimod consider repeat course  Actinic Keratoses.  Precancerous nature discussed. Sun protection, sunscreen/ blocks encouraged .  Monitoring for new lesions.  Sun damage additional recurrent and new actinic keratoses, skin cancers may occur in areas of prior actinic keratoses, related to past sun exposure to minimize current sun exposure.  Sunscreen applied consistently regularly, reapplication and sun protection while driving recommended.    dermatitis on back stable scattered patches of eczema      Multiple skin cancers, extensive AK's in the past.  Post Efudex doing better.  Consider repeat course.  Left lateral zygoma, left preauricular cheek BCC post Mohs Dr. Almonte2022  Repeat Mohs left lateral zygoma 2023.  Multiple basal cell carcinomas no recurrence.    Continue careful monitoring no other suspicious lesions no other significant changes in exam.  Encouraged more consistent use of sunscreen while driving given new lesions on the left side      Continue careful sun protection right lateral vertex biopsy benign folliculitis last office visit 6/20/2020    Other areas of prior skin cancer at the scalp clear pinkish patches consistent with noted.  Slightly erythematous anterior portion of central vertex.  Continue careful monitoring.      Postauricular neck probable BCC resolved with Aldara   chest clear.  No recurrence prior BCC.  No other new suspicious lesions  Pinkish patch upper back observe. Possible aldara    Multiple benign keratoses.     Moderate sun damage no other suspicious lesions  Other benign nevi lentigines    Continue careful monitoring patient at high risk for additional skin cancers recheck in 3 to 4 months.    Please refer to map for specific lesions.  See additional diagnoses.  Pros cons of various therapies, risks benefits  discussed.Pathophysiology discussed with patient.  Therapeutic options reviewed.  See  Medications in grid.  Instructions reviewed at length.    Benign nevi, seborrheic  keratoses, cherry angiomas:  Reassurance regarding other benign skin lesions.    Monitor for new or changing lesions. Signs and symptoms of skin cancer, ABCDE's of melanoma ( additional information available at AAD.org, skincancer.org) Encourage Sunscreen (broad-spectrum, ideally mineral-based-UVA/UVB -SPF 30 or higher) use encouraged, sun protection/sun protective clothing, self exams reviewed Followup as noted RTC ---routine checkup 6 mos -one year or p.r.n.    Encounter Times   Including precharting, reviewing chart, prior notes obtaining history: 10 minutes, medical exam :10 minutes, notes on body map, plan, counseling 10minutes My total time spent caring for the patient on the day of the encounter: 30 minutes     The patient indicates understanding of these issues and agrees to the plan.  The patient is asked to return as noted in follow-up/ above.    This note was generated using Dragon voice recognition software.  Please contact me regarding any confusion resulting from errors in recognition..  Note to patient and family: The 21st Century Cures Act makes medical notes like these available to patients. However, be advised this is a medical document. It is intended as [id number] communication and monitoring of a patient's care needs. It is written in medical language and may contain abbreviations or verbiage that are unfamiliar. It may appear blunt or direct. Medical documents are intended to carry relevant information, facts as evident and the clinical opinion of the practitioner.

## 2025-04-06 NOTE — PROGRESS NOTES
Operative Report                     Shave/  Tangential biopsy     Clinical diagnosis:    Size of lesion:   A: vertex- anterior,right pearly papule 6mmr/o BCC B:left posterior vertex - 1cm pearly nodule,   Location:    Procedure:    With patient in appropriate position the skin of the above was scrubbed with alcohol.  Anesthesia was obtained with 1% Xylocaine with epinephrine.  The skin surrounding the lesion was placed under tension and the lesion was incised using a #15 scalpel blade.  The specimen was sent for histopathologic exam.    Hemostasis was obtained with electrocautery/aluminum chloride.  Estimated blood loss less than 2 cc.    Biopsy dressed with Polysporin, bandage.    Pressure dressing:   No    Complications: None    Written instructions given and reviewed with patient    Await pathology    Contact information reviewed.    Procedural physician:  Kathy Oconnell MD

## 2025-04-07 NOTE — PROGRESS NOTES
Patient informed of test results and all KMT's recommendations. Voiced understanding. Path routed to [facility]. Pt has her info.

## 2025-04-30 NOTE — TELEPHONE ENCOUNTER
Patient wanted to inform that he is scheduled to go out of town, on the 20th of July and if ekg  results were ok to proceed with surgery. , informed per  patient ok to proceed, verbalized understanding, all questions answered. Partial/Upper

## 2025-05-23 ENCOUNTER — OFFICE VISIT (OUTPATIENT)
Dept: SURGERY | Facility: CLINIC | Age: 70
End: 2025-05-23
Payer: MEDICARE

## 2025-05-23 VITALS
HEART RATE: 78 BPM | DIASTOLIC BLOOD PRESSURE: 74 MMHG | SYSTOLIC BLOOD PRESSURE: 126 MMHG | HEIGHT: 68.3 IN | BODY MASS INDEX: 36.22 KG/M2 | OXYGEN SATURATION: 97 % | WEIGHT: 239 LBS

## 2025-05-23 DIAGNOSIS — E66.812 OBESITY, CLASS II, BMI 35-39.9: ICD-10-CM

## 2025-05-23 DIAGNOSIS — E78.00 HYPERCHOLESTEROLEMIA: ICD-10-CM

## 2025-05-23 DIAGNOSIS — K21.9 GASTROESOPHAGEAL REFLUX DISEASE, UNSPECIFIED WHETHER ESOPHAGITIS PRESENT: ICD-10-CM

## 2025-05-23 DIAGNOSIS — R06.83 SNORING: ICD-10-CM

## 2025-05-23 DIAGNOSIS — I10 PRIMARY HYPERTENSION: Primary | ICD-10-CM

## 2025-05-23 PROCEDURE — 3078F DIAST BP <80 MM HG: CPT | Performed by: INTERNAL MEDICINE

## 2025-05-23 PROCEDURE — 3008F BODY MASS INDEX DOCD: CPT | Performed by: INTERNAL MEDICINE

## 2025-05-23 PROCEDURE — 1159F MED LIST DOCD IN RCRD: CPT | Performed by: INTERNAL MEDICINE

## 2025-05-23 PROCEDURE — 99204 OFFICE O/P NEW MOD 45 MIN: CPT | Performed by: INTERNAL MEDICINE

## 2025-05-23 PROCEDURE — 1160F RVW MEDS BY RX/DR IN RCRD: CPT | Performed by: INTERNAL MEDICINE

## 2025-05-23 PROCEDURE — 3074F SYST BP LT 130 MM HG: CPT | Performed by: INTERNAL MEDICINE

## 2025-05-23 RX ORDER — METFORMIN HYDROCHLORIDE 500 MG/1
500 TABLET, EXTENDED RELEASE ORAL DAILY
Qty: 30 TABLET | Refills: 0 | Status: SHIPPED | OUTPATIENT
Start: 2025-05-23 | End: 2025-05-23

## 2025-05-23 RX ORDER — METFORMIN HYDROCHLORIDE 500 MG/1
500 TABLET, EXTENDED RELEASE ORAL 2 TIMES DAILY WITH MEALS
Qty: 60 TABLET | Refills: 0 | Status: SHIPPED | OUTPATIENT
Start: 2025-05-23

## 2025-05-23 NOTE — PROGRESS NOTES
CC:   Chief Complaint   Patient presents with    Consult    Weight Management        Referring Physician to whom this note will be reported back to: Carl Jordan MD   Reason for medical consultation: Medical Management of Weight and Weight related comorbidities.      HPI:   - The patient participated in a comprehensive weight management program that encourages behavioral modification, reduced calorie diet and increased physical activity with continuing follow up for at least 6 months prior to using drug therapy.  - labs wnl  A1c 5.4  - patient was on Ozempic 12/2022-2/2024 from 263 -205 then when he switched to medicare, he had no coverage.   -been 18 months, and he is starting to gain the weight back  - he walks 1-1.5 miles/ 3-4 xweekly  -BP rechecked in the office, discussed importance of taking BP medications, declined any cardiac or respiratory symptoms today. Discussed to go to the ER if she has any red flag symptoms. Instructed to follow up with PCP within 1 week    Body mass index is 36.02 kg/m².   Wt Readings from Last 6 Encounters:   05/23/25 239 lb (108.4 kg)   04/03/25 232 lb 3.2 oz (105.3 kg)   10/22/24 222 lb (100.7 kg)   07/29/24 215 lb 9.6 oz (97.8 kg)   05/14/24 200 lb (90.7 kg)   05/03/24 200 lb (90.7 kg)     /84   Pulse 78   Ht 5' 8.3\" (1.735 m)   Wt 239 lb (108.4 kg)   SpO2 97%   BMI 36.02 kg/m²   Vitals:    05/23/25 1032   BP: 142/84   Pulse: 78     Body mass index is 36.02 kg/m².  Waist Circumference: 50.5 inches       Typical Dietary Intake:  Breakfast AM Snack Lunch PM Snack Dinner   skip skip [place]  Chips  cookie skip 3 beers  Fried stuff     Soda Drinker?: No diet    Number of restaurant or fast food meals/week:  2 meals/week    LABS and RESULTS:     [facility] Lab Encounter on 04/04/2025   Component Date Value    Occult Blood 04/04/2025 Negative    [facility] Lab Encounter on 04/03/2025   Component Date Value    WBC 04/03/2025 4.4     RBC 04/03/2025 4.87     HGB 04/03/2025 14.1     HCT  04/03/2025 42.7     MCV 04/03/2025 87.7     MCH 04/03/2025 29.0     MCHC 04/03/2025 33.0     RDW-SD 04/03/2025 42.6     RDW 04/03/2025 13.2     PLT 04/03/2025 256.0     Neutrophil Absolute Prel* 04/03/2025 2.89     Neutrophil Absolute 04/03/2025 2.89     Lymphocyte Absolute 04/03/2025 1.04     Monocyte Absolute 04/03/2025 0.33     Eosinophil Absolute 04/03/2025 0.07     Basophil Absolute 04/03/2025 0.05     Immature Granulocyte Abs* 04/03/2025 0.01     Neutrophil % 04/03/2025 65.9     Lymphocyte % 04/03/2025 23.7     Monocyte % 04/03/2025 7.5     Eosinophil % 04/03/2025 1.6     Basophil % 04/03/2025 1.1     Immature Granulocyte % 04/03/2025 0.2     Glucose 04/03/2025 96     Sodium 04/03/2025 142     Potassium 04/03/2025 4.1     Chloride 04/03/2025 106     CO2 04/03/2025 26.0     Anion Gap 04/03/2025 10     BUN 04/03/2025 22     Creatinine 04/03/2025 0.96     BUN/CREA Ratio 04/03/2025 22.9 (H)     Calcium, Total 04/03/2025 9.0     Calculated Osmolality 04/03/2025 297 (H)     eGFR-Cr 04/03/2025 86     ALT 04/03/2025 26     AST 04/03/2025 23     Alkaline Phosphatase 04/03/2025 94     Bilirubin, Total 04/03/2025 0.8     Total Protein 04/03/2025 6.7     Albumin 04/03/2025 4.2     Globulin  04/03/2025 2.5     A/G Ratio 04/03/2025 1.7     Patient Fasting for CMP? 04/03/2025 Yes     Cholesterol, Total 04/03/2025 142     HDL Cholesterol 04/03/2025 78 (H)     Triglycerides 04/03/2025 52     LDL Cholesterol 04/03/2025 53     VLDL 04/03/2025 7     Non HDL Chol 04/03/2025 64     Patient Fasting for Lipi* 04/03/2025 Yes     TSH 04/03/2025 1.113     Total PSA  04/03/2025 1.51     Microalbumin, Urine 04/03/2025 <0.30     Creatinine Ur Random 04/03/2025 85.60     Malb/Cre Calc 04/03/2025      HgbA1C 04/03/2025 5.4     Estimated Average Glucose 04/03/2025 108    Office Visit on 04/02/2025   Component Date Value    Case Report 04/02/2025                      Value:Surgical Pathology                                Case: [id number]                                    Authorizing Provider:  Kathy Oconnell MD        Collected:           04/02/2025 07:12 PM          Ordering Location:     [facility]    Received:            04/02/2025 07:12 PM                                 [facility]                                                                     Pathologist:           Iker Dillon MD                                                         Specimens:   A) - Scalp, anterior vertex                                                                         B) - Scalp, posterior vertex                                                               Final Diagnosis: 04/02/2025                      Value:A.  Skin, right anterior vertex, shave biopsy:  -Noduloinfiltrative basal cell carcinoma, extends to deep tissue edge.    B.  Skin, left posterior vertex, shave biopsy:  -Nodular basal cell carcinoma, extends to deep tissue.      Clinical Information 04/02/2025                      Value:D48.5 Neoplasm Of Uncertain Behavior Of Skin.         Gross Description 04/02/2025                      Value:A: The specimen is received in formalin, labeled with the patient's name, demographics and \" scalp\". It consists of a 0.7 cm shave and 0.4 cm fragment of white-tan, soft skin.  The margin of the shave is inked blue and the specimen is submitted intact in A1, to be sectioned by histology.    B: The specimen is received in formalin, labeled with the patient's name, demographics and \" scalp\". It consists of a 0.7 x 0.7 cm shave of hairbearing white-tan, centrally nodular skin.  The margin is inked blue and the specimen is submitted intact in B1, to be sectioned by histology.    (RD)      Interpretation 04/02/2025 Abnormal (A)        Current Medications[1]   Past Medical History[2]  Past Surgical History[3]    Social History:  Short Social Hx on  File[4]  Family History:  Family History[5]       REVIEW OF SYSTEMS:   10 point review of systems otherwise negative.  With the exception of HPI and assessment and plan        EXAM:     GENERAL: NAD, conversant  SKIN: good turgor, no jaundice  HEENT: nl external nose and ears  NECK: supple  LUNGS: nl effort, clear to auscultation bilaterally  CARDIO: RRR, no murmur  ABDOMEN: NT/ND, no masses  EXTREMITIES: gait normal, no edema   PSYCH: Oriented times three, appropriate affect    ASSESSMENT AND PLAN:     1. Primary hypertension  2. Hypercholesterolemia  3. Obesity, Class II, BMI 35-39.9  4. Gastroesophageal reflux disease, unspecified whether esophagitis present  5. Snoring    - Initial weight 239 lb (108.4 kg), Initial Body mass index is 36.02 kg/m².  - The patient participated in a comprehensive weight management program that encourages behavioral modification, reduced calorie diet and increased physical activity with continuing follow up for at least 6 months prior to using drug therapy.     Plan:  - DIAGOSTIC SLEEP STUDY  - General sleep study; Future  - metFORMIN  MG Oral Tablet 24 Hr; Take 1 tablet (500 mg total) by mouth in the morning and 1 tablet (500 mg total) in the evening. Take with meals.  Dispense: 60 tablet; Refill: 0       Regarding Obesity:  Follow up with dietitian and psychologist as recommended.  Discussed the role of sleep and stress in weight management.  Labs orders as above.  Counseled on comprehensive weight loss plan including attention to nutrition, exercise and behavior/stress management for success. See patient instruction below for more details.  Weight Loss Consent to treat reviewed and signed.    Regarding weight loss Medications.  Medication use and side effects reviewed with patient.    Medication will be used with a reduced calorie diet and increased physical activity in the management of exogenous obesity.  Patient will be responsible to let me know of any side effects or  complications with medications.               Return in about 4 weeks (around 6/20/2025).     Pratibha Ortega MD          [1]   Current Outpatient Medications   Medication Sig Dispense Refill    metFORMIN  MG Oral Tablet 24 Hr Take 1 tablet (500 mg total) by mouth in the morning and 1 tablet (500 mg total) in the evening. Take with meals. 60 tablet 0    atorvastatin 10 MG Oral Tab Take 1 tablet (10 mg total) by mouth nightly. 90 tablet 0    lisinopril-hydroCHLOROthiazide 10-12.5 MG Oral Tab Take 1 tablet by mouth daily. 90 tablet 3    tadalafil 5 MG Oral Tab Take 1 tablet (5 mg total) by mouth daily as needed for Erectile Dysfunction. 90 tablet 3    Lansoprazole 15 MG Oral Capsule Delayed Release Take 1 capsule (15 mg total) by mouth daily. 90 capsule 3   [2]   Past Medical History:   Basal cell carcinoma    left lower cheek    Basal cell carcinoma    left lower chest    BCC (basal cell carcinoma of skin)    right alar rim    BCC (basal cell carcinoma of skin)    left vertex scalp    BCC (basal cell carcinoma)    left lateral zygoma    BCC (basal cell carcinoma)    left preauricular cheek    BCC (basal cell carcinoma)    left lateral zygoma    BCC (basal cell carcinoma), scalp/neck    x2 vertex scalp    BPH (benign prostatic hyperplasia)    Cancer (HCC)    Basal cell carcenoma    Erectile dysfunction    Essential hypertension    Folliculitis    right lateral vertex - chronic folliculitis    GERD (gastroesophageal reflux disease)    Hearing impairment    both ears    Hypercholesterolemia    Obesity    Lost some weight with Ozempic but gaining again since medication was stopped    Recurrent skin cancer    Scoliosis    Not sure this condition exists   [3]   Past Surgical History:  Procedure Laterality Date    Elbow surgery Right 1971    Hernia surgery  2023    Inguinal hernia repair Right 03/20/2024    Other  09/2008    Excision BCCa left temporal scalp    Other  11/2008    Mohs surgery BCCa hairline    Other   2016    Excision BCCa upper back    Other  2017    Excision BCCa left parietal scalp    Other  2017    Excision BCCa left lower cheek and left central chest    Other  2019    Cystoscopy, meatotomy, urethral dilatation    Other surgical history Left 2024    Excision of left subcutaneous facial epidermoid cyst with primary closure.    Skin surgery      4 or 5 basal cell surgeries since    [4]   Social History  Socioeconomic History    Marital status:    Tobacco Use    Smoking status: Former     Current packs/day: 0.00     Average packs/day: 1 pack/day for 35.0 years (35.0 ttl pk-yrs)     Types: Cigarettes     Start date: 1987     Quit date: 2022     Years since quitting: 3.3     Passive exposure: Never    Smokeless tobacco: Never    Tobacco comments:     None since    Vaping Use    Vaping status: Former   Substance and Sexual Activity    Alcohol use: Yes     Alcohol/week: 16.0 standard drinks of alcohol     Types: 8 Glasses of wine, 8 Cans of beer per week     Comment: Pause use due to surgery    Drug use: No    Sexual activity: Not Currently     Partners: Female   Other Topics Concern    Caffeine Concern Yes     Comment: coffee, 1 cup/day    [place] a farm [place]    History of tanning No    Outdoor occupation No    Pt has a pacemaker No    Pt has a defibrillator No    Reaction to local anesthetic No     Social Drivers of Health     Food Insecurity: No Food Insecurity (4/3/2025)    NCSS - Food Insecurity     Worried About Running Out of Food in the Last Year: No     Ran Out of Food in the Last Year: No   Transportation Needs: No Transportation Needs (4/3/2025)    NCSS - Transportation     Lack of Transportation: No   Housing Stability: Not At Risk (4/3/2025)    NCSS - Housing/Utilities     Has Housing: Yes     Worried About Losing Housing: No     Unable to Get Utilities: No   [5]   Family History  Problem Relation Age of Onset    Heart Disease Father          MI age 77     Other (ALS) Father     Heart Disorder Father         Heart failure due to ALS    Heart Disease Paternal Uncle          MI age 60s    Diabetes Maternal Grandfather     Cancer Maternal Grandfather     Other (Alzheimers) Mother     Dementia Mother         Started around age 75    Depression Mother     Cancer Maternal Grandfather         Details unknown    Other (Glioblastoma) Sister     Cancer Sister         Gioblastoma    Psychiatric Sister         Schizophrenia    Depression Brother     Psychiatric Daughter         Schizophrenia    Obesity Sister     Psychiatric Sister         Schizophrenia

## 2025-05-23 NOTE — PATIENT INSTRUCTIONS
Please try to work on the following dietary changes:  Goals: Aim for 20-30 grams of protein/ meal  Aim for <100 grams of carbohydrates/day  Eat 4-6 vegetables/day  Avoid skipping meals- eat every 4-5 hours  Aim for 3 meals/day  2. Drink lots of water and cut down on soda/juice consumption if soda/juice drinker  3. Focus on protein: (15-30 grams with each meal) ie. greek yogurt, cottage cheese, string cheese, hard boiled eggs  4. Healthy snacks: always have protein in your snack! peanut butter and apples, hummus and carrots, berries, nuts (1/4 cup), tuna and crackers                 Protein Shakes: Premier protein or Core Power                Protein Bars: Rx Bars, Oatmega, Power Crunch                 [place] balanced breaks (cheese and nuts)- without chocolate  5. Reduce carbohydrates <100 grams which includes sweets as well as rice, pasta, potatoes, bread, corn and instead choose whole grain options or more protein or vegetables (4-6 servings of vegetables per day). Use [employer] osmani for carb counting!  6. Get a good night of sleep  7. Try to decrease stress in life; meditate at least 10 minutes before sleeping! Try it and let me know what works for you, try youData Stream CBOTube or apps like Calm and Replicon!     Please download apps:  1. \"My Fitness Pal\" (other option is Lose it)) to help you to monitor daily dietary intake and you will be able to see if you are eating the right amount of calories, protein, carbs                With My Fitness Pal-->When you set-up the osmani or need to adjust settings:                Goals should include:                 Lose 1.5-2 lbs per week                Activity level: not very active (can't count exercise towards calorie number per day)                   ** Daily INPUT> Look at nutrition section-- \"nutrients\" and it will break down your macros for the day (ie. Protein, carbs, fibers, sugars and fats). Try to stay within these numbers daily     2. \"7 minute workout\" to help with  exercise/activity which takes 7 minutes of your day and that you can do at home!   3. \"Calm\" or \"Headspace\" which helps with mindfulness, meditation, clarity, sleep, and jairo to your daily life.   4. [employer] blog for healthy recipe ideas  5. [employer] for low carb resources     HIGH PROTEIN SNACK IDEAS  -cottage cheese  -plain yogurt  -kefir  -hard-boiled eggs  -natural cheeses  -nuts (measure portion size)   -unsweetened nut butters  -dried edamame   -maldonado seeds soaked in water or almond milk  -soy nuts  -cured meats (monitor for sodium issues)   -hummus with vegetables  -bean dip with vegetables     FRUIT  Low carb fruit options   Raspberries: Half a cup (60 grams) contains 3 grams of carbs.  Blackberries: Half a cup (70 grams) contains 4 grams of carbs.  Strawberries: Half a cup (100 grams) contains 6 grams of carbs.  Blueberries: Half a cup (50 grams) contains 6 grams of carbs.  Plum: One medium-sized (80 grams) contains 6 grams of carbs.     VEGETABLES  Low carb vegetables           Understanding Carbohydrates  Goal <100g  A car needs the right type of fuel to run. And you need the right kind of food to function. To keep your energy level up, your body needs food that has carbohydrates (carbs). But carbs raise blood sugar levels higher and faster than other kinds of food. Your dietitian will work with you to figure out the amount of carbs youneed. Carbs come in 3 types: starches, sugars, and fiber.   Starches  Starches are found in grains, some vegetables, and beans. Grain products include bread, pasta, cereal, and tortillas. Starchy vegetables include potatoes, peas, corn, lima beans, yams, and squash. Kidney beans, haq beans,black beans, garbanzo beans, and lentils also have starches.     Sugars  Sugars are found naturally in many foods. Or they can be added. Foods that contain natural sugar include fruits and fruit juices, dairy products, honey, and molasses. Added sugars are found in most  desserts, processed foods, candy, regular soda, and fruit drinks. These are very helpful to treat low blood sugar (hypoglycemia). They give you sugar quickly. Try to keep at least 15 to 20 grams of these simple sugars with you at all times. Eat or drink these if you start to havesymptoms of low blood sugar.   Fiber  Fiber comes from plant foods. Your body can't digest most fiber. Instead of raising blood sugar levels like other carbs, fiber stops blood sugar from rising too fast. Fiber is found in fruits, vegetables, whole grains, beans,peas, and many nuts.   Carb counting  Keep track of the amount of carbs you eat. This can help you keep the right balance of carbs, physical activity, and medicine. The amount of carbs you need will be different from what other people need. How much you need depends on many things. These include your health, the medicines you take, and how active you are. Your healthcare team will help you figure out the right amount of carbs for you. You may start with 45 to 60 grams of carbs per meal, depending on your case. Carb counting is a system that helps you keep track of thecarbohydrates you eat at each meal.   Carbs come from many foods. These include grains, starchy vegetables, fruit, milk, beans, and snack foods. You can either count carbohydrate grams or carbohydrate servings. When you count carbohydrate servings, 1 carbohydrateserving = 15 grams of carbohydrates.   Here are some examples of foods that have about 15 grams of carbs (1 serving of carbohydrates):   1/2 cup of canned or frozen fruit  A small piece of fresh fruit (4 ounces)  1 slice of bread  1/2 cup of oatmeal  1/3 cup of rice  4 to 6 crackers  1/2 English muffin  1/2 cup of black beans  1/4 of a large baked potato (3 ounces)  2/3 cup of plain fat-free yogurt  1 cup of soup  1/2 cup of casserole  6 chicken nuggets  2-inch-square brownie or cake without frosting  2 small cookies  1/2 cup of ice cream or sherbet  Carb  counting is easier when food labels are available. Look at the label to see how many grams of total carbs per serving the food contains. Then you can figure out how much you should eat. If your food doesn't have a nutrition label, you should be able to get an idea how many carbs there are per servingby using a book or website.   Two very important lines to look at on the label are the serving size and the total carbohydrate amount per serving. Here are some tips for using food labelsto count your carbs:   Check the serving size. The information on the label is based on that serving size. If you eat more than the listed serving size, you may have to double or triple the other information on the label.   Check the total grams of carbs.  Total carbohydrate from the label includes sugar, starch, and fiber. Be sure to use the total carbohydrate number (minus the fiber) and not sugar alone.  Know how many grams of carbs you can have.  Be familiar with the matching portion sizes.  Compare labels. Compare the labels of different products. Look at serving sizes and total carbs to find the products that work best for you.   Don't forget protein and fat. With the focus on carb counting, it might be easy to forget protein and fat in your meals. Don't forget to include sources of protein and healthy fat to balance your meals. Also watch how much salt (sodium) you eat. This is especially true if you have high blood pressure. If you have diabetes, limit the amount of sodium to less than 2,300 mg a day.    It’s also important to be consistent with the amount of carbs and time you eat when taking a fixed dose of diabetes medicine. Work with your healthcare provider or dietitian if you need more help. They can help you keep track of your carbs. They can also help you figure out how many grams of carbs youshould have.

## 2025-06-09 ENCOUNTER — HOSPITAL ENCOUNTER (OUTPATIENT)
Dept: GENERAL RADIOLOGY | Age: 70
Discharge: HOME | End: 2025-06-09
Attending: STUDENT IN AN ORGANIZED HEALTH CARE EDUCATION/TRAINING PROGRAM
Payer: MEDICARE

## 2025-06-09 ENCOUNTER — OFFICE VISIT (OUTPATIENT)
Dept: PODIATRY CLINIC | Facility: CLINIC | Age: 70
End: 2025-06-09
Payer: MEDICARE

## 2025-06-09 DIAGNOSIS — S93.401A SPRAIN OF RIGHT ANKLE, UNSPECIFIED LIGAMENT, INITIAL ENCOUNTER: ICD-10-CM

## 2025-06-09 DIAGNOSIS — T14.8XXA CONTUSION OF BONE: ICD-10-CM

## 2025-06-09 DIAGNOSIS — S93.601A SPRAIN OF RIGHT FOOT, INITIAL ENCOUNTER: ICD-10-CM

## 2025-06-09 DIAGNOSIS — S93.401A SPRAIN OF RIGHT ANKLE, UNSPECIFIED LIGAMENT, INITIAL ENCOUNTER: Primary | ICD-10-CM

## 2025-06-09 DIAGNOSIS — M79.671 RIGHT FOOT PAIN: ICD-10-CM

## 2025-06-09 PROCEDURE — 73610 X-RAY EXAM OF ANKLE: CPT | Performed by: STUDENT IN AN ORGANIZED HEALTH CARE EDUCATION/TRAINING PROGRAM

## 2025-06-09 PROCEDURE — 73630 X-RAY EXAM OF FOOT: CPT | Performed by: STUDENT IN AN ORGANIZED HEALTH CARE EDUCATION/TRAINING PROGRAM

## 2025-06-09 RX ORDER — METHYLPREDNISOLONE 4 MG/1
TABLET ORAL
Qty: 21 TABLET | Refills: 0 | Status: SHIPPED | OUTPATIENT
Start: 2025-06-09

## 2025-06-09 NOTE — PROGRESS NOTES
[facility] Podiatry  Progress Note      Anish Simental is a 69 year old male.   Chief Complaint   Patient presents with    Foot Pain     Consult right foot pain 3-8/10, toes are bruise and  foot is swollen due to pickle ball injury on 05/31/2025. Pain is  worse when he wakes up to use the bathroom at night time.             HPI:   Patient is a pleasant 69-year-old male non-Diabetic presents to clinic for evaluation of right foot pain.  Patient relates that on May 31 he injured his right lower extremity as he was playing pickle ball.  The mechanism of injury was not inversion of the right lower extremity.  Patient relates that he did complete the game and at the end he noticed swelling and bruising to his right lower extremity.  Admits that the pain is worse on the dorsal lateral aspect of the foot and ankle.  Admits to pain with pressure and walking and when walking to the bathroom at night.      Allergies: Penicillins    Current Medications[1]   Past Medical History[2]   Past Surgical History[3]   Family History[4]   Social Hx on file[5]        REVIEW OF SYSTEMS:     Denies nause, fever, chills  No calf pain  Denies chest pain or SOB      EXAM:   There were no vitals taken for this visit.  GENERAL: well developed, well nourished, in no apparent distress  EXTREMITIES:   1. Integument: Normal skin temperature and turgor  2. Vascular: Dorsalis pedis two out of four bilateral and posterior tibial pulses two out of   four bilateral, capillary refill normal.  Edema and ecchymosis to the right lower extremity   3. Musculoskeletal: All muscle groups are graded 5 out of 5 in the foot and ankle.  Tenderness palpation to right lateral ankle and right dorsal midfoot   4. Neurological: Normal sharp dull sensation; reflexes normal.             ASSESSMENT AND PLAN:   Diagnoses and all orders for this visit:    Sprain of right ankle, unspecified ligament, initial encounter  -     XR ANKLE (MIN 3 VIEWS), RIGHT  (CPT=73610); Future    Sprain of right foot, initial encounter  -     XR FOOT, COMPLETE (MIN 3 VIEWS), RIGHT (CPT=73630); Future  -     XR ANKLE (MIN 3 VIEWS), RIGHT (CPT=73610); Future    Contusion of bone  -     XR FOOT, COMPLETE (MIN 3 VIEWS), RIGHT (CPT=73630); Future    Right foot pain        Plan:     Patient seen and examined and findings discussed with patient in great detail.  Described in detail the anatomy of the lateral ankle ligaments and how an inversion type ankle sprain can place significant stress on these ligaments  Patient was given instruction to stay off the ankle as much as possible  Advised the use of compression stockings or ACE wraps to help decrease swelling/edema.  Discussed the importance of immobilization.  Dispensed a short cam boot in clinic for patient to use when ambulating.  Advised patient to take the boot off when driving and when sleeping.  Discussed conservative management   Will move forward with conservative management.  Recommend cryotherapy/icing, rest, and elevation.  We will obtain x-rays of the right lower extremity to rule out any possible fractures.  Patient to complete the Medrol Dosepak as instructed.  Patient to follow-up with pain clinic in 1 week        The patient indicates understanding of these issues and agrees to the plan.        Teodora Pittman DPM        [1]   Current Outpatient Medications   Medication Sig Dispense Refill    metFORMIN  MG Oral Tablet 24 Hr Take 1 tablet (500 mg total) by mouth in the morning and 1 tablet (500 mg total) in the evening. Take with meals. 60 tablet 0    atorvastatin 10 MG Oral Tab Take 1 tablet (10 mg total) by mouth nightly. 90 tablet 0    lisinopril-hydroCHLOROthiazide 10-12.5 MG Oral Tab Take 1 tablet by mouth daily. 90 tablet 3    tadalafil 5 MG Oral Tab Take 1 tablet (5 mg total) by mouth daily as needed for Erectile Dysfunction. 90 tablet 3    Lansoprazole 15 MG Oral Capsule Delayed Release Take 1 capsule  (15 mg total) by mouth daily. 90 capsule 3   [2]   Past Medical History:   Basal cell carcinoma    left lower cheek    Basal cell carcinoma    left lower chest    BCC (basal cell carcinoma of skin)    right alar rim    BCC (basal cell carcinoma of skin)    left vertex scalp    BCC (basal cell carcinoma)    left lateral zygoma    BCC (basal cell carcinoma)    left preauricular cheek    BCC (basal cell carcinoma)    left lateral zygoma    BCC (basal cell carcinoma), scalp/neck    x2 vertex scalp    BPH (benign prostatic hyperplasia)    Cancer (HCC)    Basal cell carcenoma    Erectile dysfunction    Essential hypertension    Folliculitis    right lateral vertex - chronic folliculitis    GERD (gastroesophageal reflux disease)    Hearing impairment    both ears    Hypercholesterolemia    Obesity    Lost some weight with Ozempic but gaining again since medication was stopped    Recurrent skin cancer    Scoliosis    Not sure this condition exists   [3]   Past Surgical History:  Procedure Laterality Date    Elbow surgery Right     Hernia surgery      Inguinal hernia repair Right 2024    Other  2008    Excision BCCa left temporal scalp    Other  2008    Mohs surgery BCCa hairline    Other  2016    Excision BCCa upper back    Other  2017    Excision BCCa left parietal scalp    Other  2017    Excision BCCa left lower cheek and left central chest    Other  2019    Cystoscopy, meatotomy, urethral dilatation    Other surgical history Left 2024    Excision of left subcutaneous facial epidermoid cyst with primary closure.    Skin surgery      4 or 5 basal cell surgeries since    [4]   Family History  Problem Relation Age of Onset    Heart Disease Father          MI age 77    Other (ALS) Father     Heart Disorder Father         Heart failure due to ALS    Heart Disease Paternal Uncle          MI age 60s    Diabetes Maternal Grandfather     Cancer Maternal Grandfather     Other  (Alzheimers) Mother     Dementia Mother         Started around age 75    Depression Mother     Cancer Maternal Grandfather         Details unknown    Other (Glioblastoma) Sister     Cancer Sister         Gioblastoma    Psychiatric Sister         Schizophrenia    Depression Brother     Psychiatric Daughter         Schizophrenia    Obesity Sister     Psychiatric Sister         Schizophrenia   [5]   Social History  Socioeconomic History    Marital status:    Tobacco Use    Smoking status: Former     Current packs/day: 0.00     Average packs/day: 1 pack/day for 35.0 years (35.0 ttl pk-yrs)     Types: Cigarettes     Start date: 1/12/1987     Quit date: 1/12/2022     Years since quitting: 3.4     Passive exposure: Never    Smokeless tobacco: Never    Tobacco comments:     None since 2022   Vaping Use    Vaping status: Former   Substance and Sexual Activity    Alcohol use: Yes     Alcohol/week: 16.0 standard drinks of alcohol     Types: 8 Glasses of wine, 8 Cans of beer per week     Comment: Pause use due to surgery    Drug use: No    Sexual activity: Not Currently     Partners: Female   Other Topics Concern    Caffeine Concern Yes     Comment: coffee, 1 cup/day    Grew up on a farm Yes    History of tanning No    Outdoor occupation No    Pt has a pacemaker No    Pt has a defibrillator No    Reaction to local anesthetic No

## 2025-06-11 ENCOUNTER — TELEPHONE (OUTPATIENT)
Age: 70
End: 2025-06-11

## 2025-06-11 DIAGNOSIS — Z00.00 ANNUAL PHYSICAL EXAM: ICD-10-CM

## 2025-06-11 NOTE — TELEPHONE ENCOUNTER
Please advise. There is no mention of a follow up with you in one week. There is mention of following up with pain clinic in 1 week, but there is no referral to that department.

## 2025-06-11 NOTE — TELEPHONE ENCOUNTER
Patient was told to f/up in a week with Dr. Araiza and forgot to stop at the desk to do so.  Patient is looking to be seen in the [facility] next Tuesday.  Please advise patient which time on 6/17/25 is good for him. Please advise.

## 2025-06-11 NOTE — TELEPHONE ENCOUNTER
Former patient of Dr Martin      Current Outpatient Medications:       Lansoprazole 15 MG Oral Capsule Delayed Release, Take 1 capsule (15 mg total) by mouth daily., Disp: 90 capsule, Rfl: 3

## 2025-06-13 RX ORDER — ATORVASTATIN CALCIUM 10 MG/1
10 TABLET, FILM COATED ORAL NIGHTLY
Qty: 90 TABLET | Refills: 3 | Status: SHIPPED | OUTPATIENT
Start: 2025-06-13

## 2025-06-13 RX ORDER — MECOBALAMIN 5000 MCG
15 TABLET,DISINTEGRATING ORAL DAILY
Qty: 90 CAPSULE | Refills: 3 | Status: SHIPPED | OUTPATIENT
Start: 2025-06-13

## 2025-06-17 ENCOUNTER — OFFICE VISIT (OUTPATIENT)
Dept: PODIATRY CLINIC | Facility: CLINIC | Age: 70
End: 2025-06-17
Payer: MEDICARE

## 2025-06-17 DIAGNOSIS — S92.354A CLOSED NONDISPLACED FRACTURE OF FIFTH METATARSAL BONE OF RIGHT FOOT, INITIAL ENCOUNTER: Primary | ICD-10-CM

## 2025-06-17 PROCEDURE — 99214 OFFICE O/P EST MOD 30 MIN: CPT | Performed by: STUDENT IN AN ORGANIZED HEALTH CARE EDUCATION/TRAINING PROGRAM

## 2025-06-17 PROCEDURE — 1159F MED LIST DOCD IN RCRD: CPT | Performed by: STUDENT IN AN ORGANIZED HEALTH CARE EDUCATION/TRAINING PROGRAM

## 2025-06-17 NOTE — PROGRESS NOTES
[facility] Podiatry  Progress Note      Anish Simental is a 69 year old male.   Chief Complaint   Patient presents with    Foot Pain     R foot/ ankle injury f/u- per pt he stopped using the boot 2 days ago- rates pain 1-3/10 on and off- here to discuss xray result-              HPI:     Patient is a 69-year-old male presents to clinic for follow-up of right lower extremity injury she sustained on May 31 after playing pickle ball.  Patient sustained a right ankle sprain as well as a right fifth metatarsal base fracture.  Patient was placed in a short cam boot and was prescribed a Medrol Dosepak which she completed.  He relates that he does not have severe pain to the right lower extremity and that with the Medrol Dosepak and the boot his symptoms have greatly improved.  He still admits to some tenderness to the right lateral foot aspect.  Patient ambulates in clinic today with regular shoes as he stopped using the cam boot 2 days ago.    Allergies: Penicillins    Current Medications[1]   Past Medical History[2]   Past Surgical History[3]   Family History[4]   Social Hx on file[5]        REVIEW OF SYSTEMS:     Denies nause, fever, chills  No calf pain  Denies chest pain or SOB      EXAM:   There were no vitals taken for this visit.  GENERAL: well developed, well nourished, in no apparent distress  EXTREMITIES:   1. Integument: Normal skin temperature and turgor  2. Vascular: Dorsalis pedis two out of four bilateral and posterior tibial pulses two out of   four bilateral, capillary refill normal.   3. Musculoskeletal: All muscle groups are graded 5 out of 5 in the foot and ankle.  Tenderness to the right fifth metatarsal base   4. Neurological: Normal sharp dull sensation; reflexes normal.      XR ANKLE (MIN 3 VIEWS), RIGHT (CPT=73610)  Result Date: 6/12/2025  CONCLUSION:   Dictated under concurrently performed foot radiographs    Dictated by (CST): Coleman Marshall MD on 6/12/2025 at 10:06 AM     Finalized  by (CST): Coleman Marshall MD on 6/12/2025 at 10:06 AM          XR FOOT, COMPLETE (MIN 3 VIEWS), RIGHT (CPT=73630)  Result Date: 6/12/2025  CONCLUSION:   Nondisplaced fracture at the base of the 5th metatarsal.  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  Mild tibiotalar osteoarthritis.     Dictated by (CST): Coleman Marshall MD on 6/12/2025 at 9:49 AM     Finalized by (CST): Coleman Marshall MD on 6/12/2025 at 9:50 AM             ASSESSMENT AND PLAN:   Diagnoses and all orders for this visit:    Closed nondisplaced fracture of fifth metatarsal bone of right foot, initial encounter  -     DME - External        Plan:     Evaluated the patient and discussed treatment options.  Reviewed the patients x-rays with the patient.  Informed patient that there is a fracture at the right fifth metatarsal base  Fracture care and treatment plan discussed.  Discussed the importance of immobilization.  Patient needs to be immobilized for at least 6 weeks in a short cam boot or surgical shoe.  Patient states that he does not want to wear the boot any week have him use a short cam boot to avoid knee pain and limit length discrepancies.  Please  Discussed conservative management   Discussed surgical management and indications for both.  Will move forward with conservative  management.  Recommend cryotherapy/icing, rest, and elevation.  Follow-up in clinic in 5 to 6 weeks for repeat x-rays.        The patient indicates understanding of these issues and agrees to the plan.        Teodora Pittman DPM        [1]   Current Outpatient Medications   Medication Sig Dispense Refill    Lansoprazole 15 MG Oral Capsule Delayed Release Take 1 capsule (15 mg total) by mouth daily. 90 capsule 3    atorvastatin 10 MG Oral Tab TAKE 1 TABLET BY MOUTH EVERY DAY AT NIGHT 90 tablet 3    methylPREDNISolone 4 MG Oral Tablet Therapy Pack Take per package insert (instructions). Take as directed on the box 21 tablet 0    metFORMIN  MG Oral Tablet 24  Hr Take 1 tablet (500 mg total) by mouth in the morning and 1 tablet (500 mg total) in the evening. Take with meals. 60 tablet 0    lisinopril-hydroCHLOROthiazide 10-12.5 MG Oral Tab Take 1 tablet by mouth daily. 90 tablet 3    tadalafil 5 MG Oral Tab Take 1 tablet (5 mg total) by mouth daily as needed for Erectile Dysfunction. 90 tablet 3   [2]   Past Medical History:   Basal cell carcinoma    left lower cheek    Basal cell carcinoma    left lower chest    BCC (basal cell carcinoma of skin)    right alar rim    BCC (basal cell carcinoma of skin)    left vertex scalp    BCC (basal cell carcinoma)    left lateral zygoma    BCC (basal cell carcinoma)    left preauricular cheek    BCC (basal cell carcinoma)    left lateral zygoma    BCC (basal cell carcinoma), scalp/neck    x2 vertex scalp    BPH (benign prostatic hyperplasia)    Cancer (HCC)    Basal cell carcenoma    Erectile dysfunction    Essential hypertension    Folliculitis    right lateral vertex - chronic folliculitis    GERD (gastroesophageal reflux disease)    Hearing impairment    both ears    Hypercholesterolemia    Obesity    Lost some weight with Ozempic but gaining again since medication was stopped    Recurrent skin cancer    Scoliosis    Not sure this condition exists   [3]   Past Surgical History:  Procedure Laterality Date    Elbow surgery Right 1971    Hernia surgery  2023    Inguinal hernia repair Right 03/20/2024    Other  09/2008    Excision BCCa left temporal scalp    Other  11/2008    Mohs surgery BCCa hairline    Other  07/2016    Excision BCCa upper back    Other  08/2017    Excision BCCa left parietal scalp    Other  12/2017    Excision BCCa left lower cheek and left central chest    Other  07/2019    Cystoscopy, meatotomy, urethral dilatation    Other surgical history Left 05/06/2024    Excision of left subcutaneous facial epidermoid cyst with primary closure.    Skin surgery      4 or 5 basal cell surgeries since 2010   [4]   Family  History  Problem Relation Age of Onset    Heart Disease Father          MI age 77    Other (ALS) Father     Heart Disorder Father         Heart failure due to ALS    Heart Disease Paternal Uncle          MI age 60s    Diabetes Maternal Grandfather     Cancer Maternal Grandfather     Other (Alzheimers) Mother     Dementia Mother         Started around age 75    Depression Mother     Cancer Maternal Grandfather         Details unknown    Other (Glioblastoma) Sister     Cancer Sister         Gioblastoma    Psychiatric Sister         Schizophrenia    Depression Brother     Psychiatric Daughter         Schizophrenia    Obesity Sister     Psychiatric Sister         Schizophrenia   [5]   Social History  Socioeconomic History    Marital status:    Tobacco Use    Smoking status: Former     Current packs/day: 0.00     Average packs/day: 1 pack/day for 35.0 years (35.0 ttl pk-yrs)     Types: Cigarettes     Start date: 1987     Quit date: 2022     Years since quitting: 3.4     Passive exposure: Never    Smokeless tobacco: Never    Tobacco comments:     None since    Vaping Use    Vaping status: Former   Substance and Sexual Activity    Alcohol use: Yes     Alcohol/week: 16.0 standard drinks of alcohol     Types: 8 Glasses of wine, 8 Cans of beer per week     Comment: Pause use due to surgery    Drug use: No    Sexual activity: Not Currently     Partners: Female   Other Topics Concern    Caffeine Concern Yes     Comment: coffee, 1 cup/day    Grew up on a farm Yes    History of tanning No    Outdoor occupation No    Pt has a pacemaker No    Pt has a defibrillator No    Reaction to local anesthetic No

## 2025-06-26 ENCOUNTER — OFFICE VISIT (OUTPATIENT)
Dept: SLEEP CENTER | Age: 70
End: 2025-06-26
Attending: INTERNAL MEDICINE
Payer: MEDICARE

## 2025-06-26 DIAGNOSIS — R06.83 SNORING: ICD-10-CM

## 2025-06-26 PROCEDURE — 95806 SLEEP STUDY UNATT&RESP EFFT: CPT

## 2025-06-27 NOTE — PROCEDURES
[facility]  Accredited by the American Academy of Sleep Medicine (AASM)    PATIENT'S NAME: AMARA NICOLE   ATTENDING PHYSICIAN: Carl Jordan MD   REFERRING PHYSICIAN: Pratibha Ortega MD   PATIENT ACCOUNT #: [id number] LOCATION: Sleep Center   MEDICAL RECORD #: [id number] YOB: 1955   DATE OF STUDY: 06/26/2025       SLEEP STUDY REPORT    STUDY TYPE:  Home sleep test.    INDICATION:  Suspected obstructive sleep apnea (ICD-10 code G47.33) in a patient with witnessed apneic events, sudden awakening from sleep with shortness of breath, excessive daytime somnolence with an [employer] Sleepiness Scale score of 10/24, bruxism, body mass index 36.6.    RESULTS:  The patient underwent home sleep test with measurement of his nasal airflow, nasal air pressure, snoring, chest and abdominal wall motion, oximetry, and body position.  I have reviewed the entirety of the raw data of this study.      During the study, the total recording time is 527 minutes.  The lights-out clock time is 10:13 p.m., the lights-on clock time is 7 a.m.  The apnea plus hypopnea index is 49.3 events per hour.  The supine apnea plus hypopnea index is 45.9 events per hour.  The average oxygen saturation is 93%, the lowest oxygen saturation is 82%, and the patient spent 4.6% of the test with saturations 88% or less.  The average heart rate is 64 beats per minute, and the patient spent approximately 10% of the test in the supine position.    INTERPRETATION:  The data generated from this study is consistent with very severe obstructive sleep apnea (ICD-10 code G47.33).    RECOMMENDATIONS:    1.   CPAP titration.    2.   Weight loss.   3.   Avoid alcohol.   4.   Avoid sedating drug.   5.   Patient should not drive if at all sleepy.    Please do not hesitate to contact me if there is any question whatsoever regarding interpretation of this study.    Dictated By Sylvester Virk MD  d: 06/27/2025 15:33:20  t: 06/27/2025  15:49:02  [facility] [id number]  [facility]/    cc: MD Carl Claudio MD

## 2025-06-30 ENCOUNTER — OFFICE VISIT (OUTPATIENT)
Dept: SURGERY | Facility: CLINIC | Age: 70
End: 2025-06-30
Payer: MEDICARE

## 2025-06-30 VITALS
WEIGHT: 237 LBS | DIASTOLIC BLOOD PRESSURE: 71 MMHG | HEIGHT: 68.3 IN | SYSTOLIC BLOOD PRESSURE: 128 MMHG | HEART RATE: 89 BPM | BODY MASS INDEX: 35.92 KG/M2

## 2025-06-30 DIAGNOSIS — N40.1 BPH ASSOCIATED WITH NOCTURIA: ICD-10-CM

## 2025-06-30 DIAGNOSIS — K21.9 GASTROESOPHAGEAL REFLUX DISEASE, UNSPECIFIED WHETHER ESOPHAGITIS PRESENT: ICD-10-CM

## 2025-06-30 DIAGNOSIS — E66.09 CLASS 1 OBESITY DUE TO EXCESS CALORIES WITHOUT SERIOUS COMORBIDITY WITH BODY MASS INDEX (BMI) OF 33.0 TO 33.9 IN ADULT: ICD-10-CM

## 2025-06-30 DIAGNOSIS — I10 PRIMARY HYPERTENSION: ICD-10-CM

## 2025-06-30 DIAGNOSIS — R35.1 BPH ASSOCIATED WITH NOCTURIA: ICD-10-CM

## 2025-06-30 DIAGNOSIS — E78.00 HYPERCHOLESTEROLEMIA: ICD-10-CM

## 2025-06-30 DIAGNOSIS — G47.33 OSA (OBSTRUCTIVE SLEEP APNEA): Primary | ICD-10-CM

## 2025-06-30 DIAGNOSIS — E66.811 CLASS 1 OBESITY DUE TO EXCESS CALORIES WITHOUT SERIOUS COMORBIDITY WITH BODY MASS INDEX (BMI) OF 33.0 TO 33.9 IN ADULT: ICD-10-CM

## 2025-06-30 PROCEDURE — 99214 OFFICE O/P EST MOD 30 MIN: CPT | Performed by: INTERNAL MEDICINE

## 2025-06-30 PROCEDURE — 1159F MED LIST DOCD IN RCRD: CPT | Performed by: INTERNAL MEDICINE

## 2025-06-30 PROCEDURE — 3074F SYST BP LT 130 MM HG: CPT | Performed by: INTERNAL MEDICINE

## 2025-06-30 PROCEDURE — 3078F DIAST BP <80 MM HG: CPT | Performed by: INTERNAL MEDICINE

## 2025-06-30 PROCEDURE — 1160F RVW MEDS BY RX/DR IN RCRD: CPT | Performed by: INTERNAL MEDICINE

## 2025-06-30 PROCEDURE — 3008F BODY MASS INDEX DOCD: CPT | Performed by: INTERNAL MEDICINE

## 2025-06-30 RX ORDER — TIRZEPATIDE 2.5 MG/.5ML
2.5 INJECTION, SOLUTION SUBCUTANEOUS WEEKLY
Qty: 2 ML | Refills: 1 | Status: SHIPPED | OUTPATIENT
Start: 2025-06-30 | End: 2025-07-22

## 2025-06-30 NOTE — PROGRESS NOTES
CC:   Chief Complaint   Patient presents with    Follow - Up    Weight Management        Referring Physician to whom this note will be reported back to: Carl Jordan MD   Reason for medical consultation: Medical Management of Weight and Weight related comorbidities.      HPI:     Initial weight: 239 lbs 5/2025  Current weight: 237 lbs  Interval weight loss: 2 lbs  Total weight loss: 2 lbs      Body mass index is 35.72 kg/m².   Wt Readings from Last 6 Encounters:   06/30/25 237 lb (107.5 kg)   05/23/25 239 lb (108.4 kg)   04/03/25 232 lb 3.2 oz (105.3 kg)   10/22/24 222 lb (100.7 kg)   07/29/24 215 lb 9.6 oz (97.8 kg)   05/14/24 200 lb (90.7 kg)     /71   Pulse 89   Ht 5' 8.3\" (1.735 m)   Wt 237 lb (107.5 kg)   BMI 35.72 kg/m²   Vitals:    06/30/25 1255   BP: 128/71   Pulse: 89     Body mass index is 35.72 kg/m².          Typical Dietary Intake:  Breakfast AM Snack Lunch PM Snack Dinner   skip skip [place]  Chips  cookie skip 3 beers  Fried stuff     Soda Drinker?: No diet    Number of restaurant or fast food meals/week:  2 meals/week    LABS and RESULTS:     [facility] Lab Encounter on 04/04/2025   Component Date Value    Occult Blood 04/04/2025 Negative    [facility] Lab Encounter on 04/03/2025   Component Date Value    WBC 04/03/2025 4.4     RBC 04/03/2025 4.87     HGB 04/03/2025 14.1     HCT 04/03/2025 42.7     MCV 04/03/2025 87.7     MCH 04/03/2025 29.0     MCHC 04/03/2025 33.0     RDW-SD 04/03/2025 42.6     RDW 04/03/2025 13.2     PLT 04/03/2025 256.0     Neutrophil Absolute Prel* 04/03/2025 2.89     Neutrophil Absolute 04/03/2025 2.89     Lymphocyte Absolute 04/03/2025 1.04     Monocyte Absolute 04/03/2025 0.33     Eosinophil Absolute 04/03/2025 0.07     Basophil Absolute 04/03/2025 0.05     Immature Granulocyte Abs* 04/03/2025 0.01     Neutrophil % 04/03/2025 65.9     Lymphocyte % 04/03/2025 23.7     Monocyte % 04/03/2025 7.5     Eosinophil % 04/03/2025 1.6     Basophil % 04/03/2025 1.1     Immature Granulocyte %  04/03/2025 0.2     Glucose 04/03/2025 96     Sodium 04/03/2025 142     Potassium 04/03/2025 4.1     Chloride 04/03/2025 106     CO2 04/03/2025 26.0     Anion Gap 04/03/2025 10     BUN 04/03/2025 22     Creatinine 04/03/2025 0.96     BUN/CREA Ratio 04/03/2025 22.9 (H)     Calcium, Total 04/03/2025 9.0     Calculated Osmolality 04/03/2025 297 (H)     eGFR-Cr 04/03/2025 86     ALT 04/03/2025 26     AST 04/03/2025 23     Alkaline Phosphatase 04/03/2025 94     Bilirubin, Total 04/03/2025 0.8     Total Protein 04/03/2025 6.7     Albumin 04/03/2025 4.2     Globulin  04/03/2025 2.5     A/G Ratio 04/03/2025 1.7     Patient Fasting for CMP? 04/03/2025 Yes     Cholesterol, Total 04/03/2025 142     HDL Cholesterol 04/03/2025 78 (H)     Triglycerides 04/03/2025 52     LDL Cholesterol 04/03/2025 53     VLDL 04/03/2025 7     Non HDL Chol 04/03/2025 64     Patient Fasting for Lipi* 04/03/2025 Yes     TSH 04/03/2025 1.113     Total PSA  04/03/2025 1.51     Microalbumin, Urine 04/03/2025 <0.30     Creatinine Ur Random 04/03/2025 85.60     Malb/Cre Calc 04/03/2025      HgbA1C 04/03/2025 5.4     Estimated Average Glucose 04/03/2025 108    Office Visit on 04/02/2025   Component Date Value    Case Report 04/02/2025                      Value:Surgical Pathology                                Case: [id number]                                   Authorizing Provider:  Kathy Oconnell MD        Collected:           04/02/2025 07:12 PM          Ordering Location:     [place]    Received:            04/02/2025 07:12 PM                                 [facility]                                                                     Pathologist:           Iker Dillon MD                                                         Specimens:   A) - Scalp, anterior vertex                                                                          B) - Scalp, posterior vertex                                                               Final Diagnosis: 04/02/2025                      Value:A.  Skin, right anterior vertex, shave biopsy:  -Noduloinfiltrative basal cell carcinoma, extends to deep tissue edge.    B.  Skin, left posterior vertex, shave biopsy:  -Nodular basal cell carcinoma, extends to deep tissue.      Clinical Information 04/02/2025                      Value:D48.5 Neoplasm Of Uncertain Behavior Of Skin.         Gross Description 04/02/2025                      Value:A: The specimen is received in formalin, labeled with the patient's name, demographics and \" scalp\". It consists of a 0.7 cm shave and 0.4 cm fragment of white-tan, soft skin.  The margin of the shave is inked blue and the specimen is submitted intact in A1, to be sectioned by histology.    B: The specimen is received in formalin, labeled with the patient's name, demographics and \" scalp\". It consists of a 0.7 x 0.7 cm shave of hairbearing white-tan, centrally nodular skin.  The margin is inked blue and the specimen is submitted intact in B1, to be sectioned by histology.    (RD)      Interpretation 04/02/2025 Abnormal (A)        Current Medications[1]   Past Medical History[2]  Past Surgical History[3]    Social History:  Short Social Hx on File[4]  Family History:  Family History[5]       REVIEW OF SYSTEMS:   10 point review of systems otherwise negative.  With the exception of HPI and assessment and plan        EXAM:     GENERAL: NAD, conversant  SKIN: good turgor, no jaundice  HEENT: nl external nose and ears  NECK: supple  LUNGS: nl effort, clear to auscultation bilaterally  CARDIO: RRR, no murmur  ABDOMEN: NT/ND, no masses  EXTREMITIES: gait normal, no edema   PSYCH: Oriented times three, appropriate affect    ASSESSMENT AND PLAN:   1. STEVEN (obstructive sleep apnea)  2. Primary hypertension  3. Hypercholesterolemia  4. Class 1 obesity due to excess calories without serious  comorbidity with body mass index (BMI) of 33.0 to 33.9 in adult  5. Gastroesophageal reflux disease, unspecified whether esophagitis present  6. BPH associated with nocturia      - Initial weight 239 lb (108.4 kg), Initial Body mass index is 36.02 kg/m².  - labs wnl  A1c 5.4  - patient was on Ozempic 12/2022-2/2024 from 263 lbs -205 lbs then when he switched to medicare, he had no coverage.   -been 18 months, and he is starting to gain the weight back  - he walks 1-1.5 miles/ 3-4 xweekly  - sleep study  6/2025 with very severe STEVEN The apnea plus hypopnea index is 49.3 events per hour.   - metformin not tolerated  patient is interested in GLP1 medications, patient denies any personal or family history of MTC or in patients with Multiple Endocrine Neoplasia syndrome type 2 (MEN 2). Counseled patient regarding the potential risk for MTC with the use of semaglutide and inform them of symptoms of thyroid tumors (eg, a mass in the neck, dysphagia, dyspnea, persistent hoarseness).     Plan  - start zepbound for severe STEVEN       Regarding Obesity:  Follow up with dietitian and psychologist as recommended.  Discussed the role of sleep and stress in weight management.  Labs orders as above.  Counseled on comprehensive weight loss plan including attention to nutrition, exercise and behavior/stress management for success. See patient instruction below for more details.  Weight Loss Consent to treat reviewed and signed.    Regarding weight loss Medications.  Medication use and side effects reviewed with patient.    Medication will be used with a reduced calorie diet and increased physical activity in the management of exogenous obesity.  Patient will be responsible to let me know of any side effects or complications with medications.               Return in about 2 months (around 8/30/2025).     Pratibha Ortega MD          [1]   Current Outpatient Medications   Medication Sig Dispense Refill    Tirzepatide-Weight Management  (ZEPBOUND) 2.5 MG/0.5ML Subcutaneous Solution Auto-injector Inject 2.5 mg into the skin once a week for 4 doses. 2 mL 1    Lansoprazole 15 MG Oral Capsule Delayed Release Take 1 capsule (15 mg total) by mouth daily. 90 capsule 3    atorvastatin 10 MG Oral Tab TAKE 1 TABLET BY MOUTH EVERY DAY AT NIGHT 90 tablet 3    metFORMIN  MG Oral Tablet 24 Hr Take 1 tablet (500 mg total) by mouth in the morning and 1 tablet (500 mg total) in the evening. Take with meals. 60 tablet 0    lisinopril-hydroCHLOROthiazide 10-12.5 MG Oral Tab Take 1 tablet by mouth daily. 90 tablet 3    tadalafil 5 MG Oral Tab Take 1 tablet (5 mg total) by mouth daily as needed for Erectile Dysfunction. 90 tablet 3   [2]   Past Medical History:   Basal cell carcinoma    left lower cheek    Basal cell carcinoma    left lower chest    BCC (basal cell carcinoma of skin)    right alar rim    BCC (basal cell carcinoma of skin)    left vertex scalp    BCC (basal cell carcinoma)    left lateral zygoma    BCC (basal cell carcinoma)    left preauricular cheek    BCC (basal cell carcinoma)    left lateral zygoma    BCC (basal cell carcinoma), scalp/neck    x2 vertex scalp    BPH (benign prostatic hyperplasia)    Cancer (HCC)    Basal cell carcenoma    Erectile dysfunction    Essential hypertension    Folliculitis    right lateral vertex - chronic folliculitis    GERD (gastroesophageal reflux disease)    Hearing impairment    both ears    Hypercholesterolemia    Obesity    Lost some weight with Ozempic but gaining again since medication was stopped    Recurrent skin cancer    Scoliosis    Not sure this condition exists   [3]   Past Surgical History:  Procedure Laterality Date    Elbow surgery Right 1971    Hernia surgery  2023    Inguinal hernia repair Right 03/20/2024    Other  09/2008    Excision BCCa left temporal scalp    Other  11/2008    Mohs surgery BCCa hairline    Other  07/2016    Excision BCCa upper back    Other  08/2017    Excision BCCa left  parietal scalp    Other  2017    Excision BCCa left lower cheek and left central chest    Other  2019    Cystoscopy, meatotomy, urethral dilatation    Other surgical history Left 2024    Excision of left subcutaneous facial epidermoid cyst with primary closure.    Skin surgery      4 or 5 basal cell surgeries since    [4]   Social History  Socioeconomic History    Marital status:    Tobacco Use    Smoking status: Former     Current packs/day: 0.00     Average packs/day: 1 pack/day for 35.0 years (35.0 ttl pk-yrs)     Types: Cigarettes     Start date: 1987     Quit date: 2022     Years since quitting: 3.4     Passive exposure: Never    Smokeless tobacco: Never    Tobacco comments:     None since    Vaping Use    Vaping status: Former   Substance and Sexual Activity    Alcohol use: Yes     Alcohol/week: 16.0 standard drinks of alcohol     Types: 8 Glasses of wine, 8 Cans of beer per week     Comment: Pause use due to surgery    Drug use: No    Sexual activity: Not Currently     Partners: Female   Other Topics Concern    Caffeine Concern Yes     Comment: coffee, 1 cup/day    Grew up on a farm Yes    History of tanning No    Outdoor occupation No    Pt has a pacemaker No    Pt has a defibrillator No    Reaction to local anesthetic No     Social Drivers of Health     Food Insecurity: No Food Insecurity (4/3/2025)    NCSS - Food Insecurity     Worried About Running Out of Food in the Last Year: No     Ran Out of Food in the Last Year: No   Transportation Needs: No Transportation Needs (4/3/2025)    NCSS - Transportation     Lack of Transportation: No   Housing Stability: Not At Risk (4/3/2025)    NCSS - Housing/Utilities     Has Housing: Yes     Worried About Losing Housing: No     Unable to Get Utilities: No   [5]   Family History  Problem Relation Age of Onset    Heart Disease Father          MI age 77    Other (ALS) Father     Heart Disorder Father         Heart failure due to  ALS    Heart Disease Paternal Uncle          MI age 60s    Diabetes Maternal Grandfather     Cancer Maternal Grandfather     Other (Alzheimers) Mother     Dementia Mother         Started around age 75    Depression Mother     Cancer Maternal Grandfather         Details unknown    Other (Glioblastoma) Sister     Cancer Sister         Gioblastoma    Psychiatric Sister         Schizophrenia    Depression Brother     Psychiatric Daughter         Schizophrenia    Obesity Sister     Psychiatric Sister         Schizophrenia

## 2025-07-15 ENCOUNTER — OFFICE VISIT (OUTPATIENT)
Dept: PODIATRY CLINIC | Facility: CLINIC | Age: 70
End: 2025-07-15
Payer: MEDICARE

## 2025-07-15 ENCOUNTER — TELEPHONE (OUTPATIENT)
Dept: PODIATRY CLINIC | Facility: CLINIC | Age: 70
End: 2025-07-15

## 2025-07-15 ENCOUNTER — TELEPHONE (OUTPATIENT)
Facility: CLINIC | Age: 70
End: 2025-07-15

## 2025-07-15 ENCOUNTER — HOSPITAL ENCOUNTER (OUTPATIENT)
Dept: GENERAL RADIOLOGY | Facility: HOSPITAL | Age: 70
Discharge: HOME | End: 2025-07-15
Attending: STUDENT IN AN ORGANIZED HEALTH CARE EDUCATION/TRAINING PROGRAM
Payer: MEDICARE

## 2025-07-15 VITALS — BODY MASS INDEX: 35.92 KG/M2 | HEIGHT: 68.3 IN | WEIGHT: 237 LBS

## 2025-07-15 DIAGNOSIS — R52 PAIN: ICD-10-CM

## 2025-07-15 DIAGNOSIS — S92.354A CLOSED NONDISPLACED FRACTURE OF FIFTH METATARSAL BONE OF RIGHT FOOT, INITIAL ENCOUNTER: Primary | ICD-10-CM

## 2025-07-15 PROCEDURE — 1159F MED LIST DOCD IN RCRD: CPT | Performed by: STUDENT IN AN ORGANIZED HEALTH CARE EDUCATION/TRAINING PROGRAM

## 2025-07-15 PROCEDURE — 99214 OFFICE O/P EST MOD 30 MIN: CPT | Performed by: STUDENT IN AN ORGANIZED HEALTH CARE EDUCATION/TRAINING PROGRAM

## 2025-07-15 PROCEDURE — 3008F BODY MASS INDEX DOCD: CPT | Performed by: STUDENT IN AN ORGANIZED HEALTH CARE EDUCATION/TRAINING PROGRAM

## 2025-07-15 PROCEDURE — 1126F AMNT PAIN NOTED NONE PRSNT: CPT | Performed by: STUDENT IN AN ORGANIZED HEALTH CARE EDUCATION/TRAINING PROGRAM

## 2025-07-15 PROCEDURE — 73630 X-RAY EXAM OF FOOT: CPT | Performed by: STUDENT IN AN ORGANIZED HEALTH CARE EDUCATION/TRAINING PROGRAM

## 2025-07-15 NOTE — PROGRESS NOTES
[facility] Podiatry  Progress Note      Anish Simental is a 69 year old male.   Chief Complaint   Patient presents with    Foot Pain     R foot/ f/u- per pt he has not been using the boot cam - rates pain 0/10, Last A1C: 5.4 Done 04/03/25 with Dr. Jordan              HPI:   Patient is a 69-year-old male presents to clinic for follow-up of right fifth metatarsal base fracture.  Patient presents to clinic today in regular shoes and not in cam boot as previously instructed.  Denies any pain today.  Denies any other pedal injuries or trauma.  Per charting patient was instructed to remain weightbearing in the cam boot for 5 to 6 weeks as of last visit in June however today he presents in regular shoes      Allergies: Penicillins    Current Medications[1]   Past Medical History[2]   Past Surgical History[3]   Family History[4]   Social Hx on file[5]        REVIEW OF SYSTEMS:     Denies nause, fever, chills  No calf pain  Denies chest pain or SOB      EXAM:   Ht 5' 8.3\" (1.735 m)   Wt 237 lb (107.5 kg)   BMI 35.72 kg/m²   GENERAL: well developed, well nourished, in no apparent distress  EXTREMITIES:   1. Integument: Normal skin temperature and turgor  2. Vascular: Dorsalis pedis two out of four bilateral and posterior tibial pulses two out of   four bilateral, capillary refill normal.   3. Musculoskeletal: All muscle groups are graded 5 out of 5 in the foot and ankle.  No pain with palpation to right fifth metatarsal base   4. Neurological: Normal sharp dull sensation; reflexes normal.             ASSESSMENT AND PLAN:   Diagnoses and all orders for this visit:    Closed nondisplaced fracture of fifth metatarsal bone of right foot, initial encounter    Pain  -     XR FOOT, COMPLETE (MIN 3 VIEWS), RIGHT (CPT=73630); Future        Plan:     Patient seen and examined and findings discussed with patient.  Obtained right foot x-rays again which show further displacement of the right fifth metatarsal base.  At this  discussed with patient the complications associated with noncompliance and not being in the boot as instructed.  I informed the patient that at this point he needs to undergo a surgical correction to reapproximate the fractured fragment.  I will not be in town in June and no surgical cases to be booked until September.  I did discuss patient's case with my colleague Dr. Titus who will be seeing and evaluating patient on Monday for further recommendations on surgical intervention.  I did call patient after his clinic visit discussed x-ray findings again and that he would need an appointment with Dr. Titus to discuss the next step in regards to surgery.  Again I instructed patient to get back into the short cam boot for immobilization to prevent any further displacement of fracture fragment.    The patient indicates understanding of these issues and agrees to the plan.        Teodora Pittman DPM          [1]   Current Outpatient Medications   Medication Sig Dispense Refill    Tirzepatide-Weight Management (ZEPBOUND) 2.5 MG/0.5ML Subcutaneous Solution Auto-injector Inject 2.5 mg into the skin once a week for 4 doses. 2 mL 1    Lansoprazole 15 MG Oral Capsule Delayed Release Take 1 capsule (15 mg total) by mouth daily. 90 capsule 3    atorvastatin 10 MG Oral Tab TAKE 1 TABLET BY MOUTH EVERY DAY AT NIGHT 90 tablet 3    metFORMIN  MG Oral Tablet 24 Hr Take 1 tablet (500 mg total) by mouth in the morning and 1 tablet (500 mg total) in the evening. Take with meals. 60 tablet 0    lisinopril-hydroCHLOROthiazide 10-12.5 MG Oral Tab Take 1 tablet by mouth daily. 90 tablet 3    tadalafil 5 MG Oral Tab Take 1 tablet (5 mg total) by mouth daily as needed for Erectile Dysfunction. 90 tablet 3    Tirzepatide-Weight Management (ZEPBOUND) 5 MG/0.5ML Subcutaneous Solution Auto-injector Inject 5 mg into the skin once a week for 4 doses. 2 mL 0   [2]   Past Medical History:   Basal cell carcinoma    left lower cheek     Basal cell carcinoma    left lower chest    BCC (basal cell carcinoma of skin)    right alar rim    BCC (basal cell carcinoma of skin)    left vertex scalp    BCC (basal cell carcinoma)    left lateral zygoma    BCC (basal cell carcinoma)    left preauricular cheek    BCC (basal cell carcinoma)    left lateral zygoma    BCC (basal cell carcinoma), scalp/neck    x2 vertex scalp    BPH (benign prostatic hyperplasia)    Cancer (HCC)    Basal cell carcenoma    Erectile dysfunction    Essential hypertension    Folliculitis    right lateral vertex - chronic folliculitis    GERD (gastroesophageal reflux disease)    Hearing impairment    both ears    Hypercholesterolemia    Obesity    Lost some weight with Ozempic but gaining again since medication was stopped    Recurrent skin cancer    Scoliosis    Not sure this condition exists   [3]   Past Surgical History:  Procedure Laterality Date    Elbow surgery Right 1971    Hernia surgery      Inguinal hernia repair Right 2024    Other  2008    Excision BCCa left temporal scalp    Other  2008    Mohs surgery BCCa hairline    Other  2016    Excision BCCa upper back    Other  2017    Excision BCCa left parietal scalp    Other  2017    Excision BCCa left lower cheek and left central chest    Other  2019    Cystoscopy, meatotomy, urethral dilatation    Other surgical history Left 2024    Excision of left subcutaneous facial epidermoid cyst with primary closure.    Skin surgery      4 or 5 basal cell surgeries since    [4]   Family History  Problem Relation Age of Onset    Heart Disease Father          MI age 77    Other (ALS) Father     Heart Disorder Father         Heart failure due to ALS    Heart Disease Paternal Uncle          MI age 60s    Diabetes Maternal Grandfather     Cancer Maternal Grandfather     Other (Alzheimers) Mother     Dementia Mother         Started around age 75    Depression Mother     Cancer Maternal Grandfather          Details unknown    Other (Glioblastoma) Sister     Cancer Sister         Gioblastoma    Psychiatric Sister         Schizophrenia    Depression Brother     Psychiatric Daughter         Schizophrenia    Obesity Sister     Psychiatric Sister         Schizophrenia   [5]   Social History  Socioeconomic History    Marital status:    Tobacco Use    Smoking status: Former     Current packs/day: 0.00     Average packs/day: 1 pack/day for 35.0 years (35.0 ttl pk-yrs)     Types: Cigarettes     Start date: 1/12/1987     Quit date: 1/12/2022     Years since quitting: 3.5     Passive exposure: Never    Smokeless tobacco: Never    Tobacco comments:     None since 2022   Vaping Use    Vaping status: Former   Substance and Sexual Activity    Alcohol use: Yes     Alcohol/week: 16.0 standard drinks of alcohol     Types: 8 Glasses of wine, 8 Cans of beer per week     Comment: Pause use due to surgery    Drug use: No    Sexual activity: Not Currently     Partners: Female   Other Topics Concern    Caffeine Concern Yes     Comment: coffee, 1 cup/day    Grew up on a farm Yes    History of tanning No    Outdoor occupation No    Pt has a pacemaker No    Pt has a defibrillator No    Reaction to local anesthetic No

## 2025-07-15 NOTE — TELEPHONE ENCOUNTER
Patient called to state he lost the walking boot he was given last month and pt state Dr. Peña told him at his appointment today that he HAS to be wearing it. States he checked his whole house and can't find it.  Patient is asking for another boot to be ordered for him.   Please advise.

## 2025-07-15 NOTE — TELEPHONE ENCOUNTER
Spoke with patient and he states he has the short camboot, but he doesn't have the PO shoe. I advsied we could dispense another one to him here, but we use a 3rd party [facility] and they will bill his insurance and I am not sure if the item will be covered or not since he already had one dispensed recently. I recommended checking amazon or local pharmacy. He states he did find one on [employer] and will order from there. Advised to call back if any concerns.

## 2025-07-16 NOTE — TELEPHONE ENCOUNTER
Patient notified that disc has been made and is ready for  at [facility] . He will  today.

## 2025-07-16 NOTE — TELEPHONE ENCOUNTER
Called patient and appointment scheduled on 7/22 at 11:50am with Dr Titus at [facility].   Patient requesting a disc with images, will have made.   Canceled appointment with Dr Pittman on 7/22.

## 2025-07-22 ENCOUNTER — TELEPHONE (OUTPATIENT)
Dept: PODIATRY CLINIC | Facility: CLINIC | Age: 70
End: 2025-07-22

## 2025-07-22 ENCOUNTER — LAB ENCOUNTER (OUTPATIENT)
Dept: LAB | Age: 70
End: 2025-07-22
Attending: PODIATRIST
Payer: MEDICARE

## 2025-07-22 ENCOUNTER — OFFICE VISIT (OUTPATIENT)
Dept: PODIATRY CLINIC | Facility: CLINIC | Age: 70
End: 2025-07-22
Payer: MEDICARE

## 2025-07-22 DIAGNOSIS — M85.871 OSTEOPENIA OF RIGHT FOOT: Primary | ICD-10-CM

## 2025-07-22 DIAGNOSIS — S92.354A CLOSED NONDISPLACED FRACTURE OF FIFTH METATARSAL BONE OF RIGHT FOOT, INITIAL ENCOUNTER: ICD-10-CM

## 2025-07-22 DIAGNOSIS — S92.351K CLOSED DISPLACED FRACTURE OF FIFTH METATARSAL BONE OF RIGHT FOOT WITH NONUNION: ICD-10-CM

## 2025-07-22 DIAGNOSIS — M85.871 OSTEOPENIA OF RIGHT FOOT: ICD-10-CM

## 2025-07-22 LAB — VIT D+METAB SERPL-MCNC: 38.4 NG/ML (ref 30–100)

## 2025-07-22 PROCEDURE — 36415 COLL VENOUS BLD VENIPUNCTURE: CPT

## 2025-07-22 PROCEDURE — 99214 OFFICE O/P EST MOD 30 MIN: CPT | Performed by: PODIATRIST

## 2025-07-22 PROCEDURE — 82306 VITAMIN D 25 HYDROXY: CPT

## 2025-07-22 PROCEDURE — 1159F MED LIST DOCD IN RCRD: CPT | Performed by: PODIATRIST

## 2025-07-22 NOTE — PROGRESS NOTES
Reason for Visit      Anish Simental is a 69 year old male presents today complaining of right fifth metatarsal fracture.     History of Present Illness     Patient presents to clinic today after being seen last week for a displaced fifth metatarsal base fracture.  Patient states that he injured it on 4/3/2025.  Patient was given a walking boot which she did not wear and started playing pickle ball.  Patient followed up with podiatry in which noted displacement of the fifth metatarsal base fracture but with very minimal pain.  Patient presents today in walking boot and states he has not been wearing it consistently but does not have much pain after being on it a significant amount.    The following portions of the patient's history were reviewed and updated as appropriate: allergies, current medications, past family history, past medical history, past social history, past surgical history and problem list.    Allergies[1]    Medications - Current[2]    Medications Discontinued During This Encounter   Medication Reason    metFORMIN  MG Oral Tablet 24 Hr Discontinued by another Health Care Provider       Problem List[3]    Past Medical History[4]    Past Surgical History[5]    Family History[6]    Social History     Occupational History    Not on file   Tobacco Use    Smoking status: Former     Current packs/day: 0.00     Average packs/day: 1 pack/day for 35.0 years (35.0 ttl pk-yrs)     Types: Cigarettes     Start date: 1/12/1987     Quit date: 1/12/2022     Years since quitting: 3.5     Passive exposure: Never    Smokeless tobacco: Never    Tobacco comments:     None since 2022   Vaping Use    Vaping status: Former   Substance and Sexual Activity    Alcohol use: Yes     Alcohol/week: 16.0 standard drinks of alcohol     Types: 8 Glasses of wine, 8 Cans of beer per week     Comment: Pause use due to surgery    Drug use: No    Sexual activity: Not Currently     Partners: Female       ROS       Constitutional: negative for chills, fevers and sweats  Gastrointestinal: negative for abdominal pain, diarrhea, nausea and vomiting  Genitourinary:negative for dysuria and hematuria  Musculoskeletal:negative for arthralgias and muscle weakness  Neurological: negative for paresthesia and weakness  All others reviewed and negative.      Physical Exam     LE PHYSICAL EXAM    Constitution: Well-developed and well-nourished. Gait appears normal. No apparent distress. Alert and oriented to person, place, and time.  Integument: There are no varicosities. Skin appears moist, warm, and supple with positive hair growth. There are no color changes. No open lesions. No macerations, No Hyperkeratotic lesions.  Vascular examination: Dorsalis pedis and posterior tibial pulses are strong bilaterally with capillary filling time less than 3 seconds to all digits. There is no peripheral edema..  Neurological Sensorium: Grossly intact to sharp/dull. Vibratory: Intact.  Musculoskeletal:   5/5 pedal muscle strength b/l     Minimal pain on palpation to the base of the fifth metatarsal.  No fluctuance drainage ascending cellulitis.  No signs of infection noted.  No open lesions noted.  No pain along the peroneal tendon.  No pain with eversion against resistance    Assessment and Plan     Encounter Diagnoses   Name Primary?    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  Patient clinically presents with not much pain to the area however he has not resumed his high-impact activities.  Will place an order for CT for further assessment.  Also placed an order for his vitamin D levels to ensure appropriate healing.  Discussed that even if he undergoes surgical intervention he needs appropriate  levels in order to heal.  Also dispensed information regards for bone stimulator as well.  Patient will start with CT results of based on the amount of bony bridging and we will discuss surgery moving forward.    Patient was instructed to call the office or on-call podiatric physician immediately with any issues or concerns before the next scheduled visit. Patient to follow-up in clinic in after CT      Argelia Dietrich DPM, D.CARLY, FACFAS  Diplomat, American Board of Foot and Ankle Surgery  Certified in Foot and Rearfoot/Ankle Reconstruction  Fellow of the American College of Foot and Ankle Surgeons  Fellowship Trained Foot and Ankle Surgeon   [facility]     7/22/2025    12:43 PM       [1]   Allergies  Allergen Reactions    Penicillins FACE FLUSHING     No skin peeling or blisters.    [2]   Current Outpatient Medications:     Tirzepatide-Weight Management (ZEPBOUND) 5 MG/0.5ML Subcutaneous Solution Auto-injector, Inject 5 mg into the skin once a week for 4 doses., Disp: 2 mL, Rfl: 0    Tirzepatide-Weight Management (ZEPBOUND) 2.5 MG/0.5ML Subcutaneous Solution Auto-injector, Inject 2.5 mg into the skin once a week for 4 doses., Disp: 2 mL, Rfl: 1    Lansoprazole 15 MG Oral Capsule Delayed Release, Take 1 capsule (15 mg total) by mouth daily., Disp: 90 capsule, Rfl: 3    atorvastatin 10 MG Oral Tab, TAKE 1 TABLET BY MOUTH EVERY DAY AT NIGHT, Disp: 90 tablet, Rfl: 3    lisinopril-hydroCHLOROthiazide 10-12.5 MG Oral Tab, Take 1 tablet by mouth daily., Disp: 90 tablet, Rfl: 3    tadalafil 5 MG Oral Tab, Take 1 tablet (5 mg total) by mouth daily as needed for Erectile Dysfunction., Disp: 90 tablet, Rfl: 3  [3]   Patient Active Problem List  Diagnosis    Primary hypertension    Hypercholesterolemia    Erectile dysfunction    GERD (gastroesophageal reflux disease)    BCC (basal cell carcinoma of skin)    BPH associated with nocturia    Congenital stricture of urethra    Class 1 obesity due to excess  calories without serious comorbidity with body mass index (BMI) of 33.0 to 33.9 in adult   [4]   Past Medical History:   Basal cell carcinoma    left lower cheek    Basal cell carcinoma    left lower chest    BCC (basal cell carcinoma of skin)    right alar rim    BCC (basal cell carcinoma of skin)    left vertex scalp    BCC (basal cell carcinoma)    left lateral zygoma    BCC (basal cell carcinoma)    left preauricular cheek    BCC (basal cell carcinoma)    left lateral zygoma    BCC (basal cell carcinoma), scalp/neck    x2 vertex scalp    BPH (benign prostatic hyperplasia)    Cancer (HCC)    Basal cell carcenoma    Erectile dysfunction    Essential hypertension    Folliculitis    right lateral vertex - chronic folliculitis    GERD (gastroesophageal reflux disease)    Hearing impairment    both ears    Hypercholesterolemia    Obesity    Lost some weight with Ozempic but gaining again since medication was stopped    Recurrent skin cancer    Scoliosis    Not sure this condition exists   [5]   Past Surgical History:  Procedure Laterality Date    Elbow surgery Right     Hernia surgery      Inguinal hernia repair Right 2024    Other  2008    Excision BCCa left temporal scalp    Other  2008    Mohs surgery BCCa hairline    Other  2016    Excision BCCa upper back    Other  2017    Excision BCCa left parietal scalp    Other  2017    Excision BCCa left lower cheek and left central chest    Other  2019    Cystoscopy, meatotomy, urethral dilatation    Other surgical history Left 2024    Excision of left subcutaneous facial epidermoid cyst with primary closure.    Skin surgery      4 or 5 basal cell surgeries since    [6]   Family History  Problem Relation Age of Onset    Heart Disease Father          MI age 77    Other (ALS) Father     Heart Disorder Father         Heart failure due to ALS    Heart Disease Paternal Uncle          MI age 60s    Diabetes Maternal Grandfather      Cancer Maternal Grandfather     Other (Alzheimers) Mother     Dementia Mother         Started around age 75    Depression Mother     Cancer Maternal Grandfather         Details unknown    Other (Glioblastoma) Sister     Cancer Sister         Gioblastoma    Psychiatric Sister         Schizophrenia    Depression Brother     Psychiatric Daughter         Schizophrenia    Obesity Sister     Psychiatric Sister         Schizophrenia

## 2025-07-23 ENCOUNTER — RESULTS FOLLOW-UP (OUTPATIENT)
Dept: LAB | Age: 70
End: 2025-07-23

## 2025-07-24 ENCOUNTER — HOSPITAL ENCOUNTER (OUTPATIENT)
Dept: CT IMAGING | Facility: HOSPITAL | Age: 70
Discharge: HOME | End: 2025-07-24
Attending: PODIATRIST
Payer: MEDICARE

## 2025-07-24 DIAGNOSIS — S92.354A CLOSED NONDISPLACED FRACTURE OF FIFTH METATARSAL BONE OF RIGHT FOOT, INITIAL ENCOUNTER: ICD-10-CM

## 2025-07-24 PROCEDURE — 73700 CT LOWER EXTREMITY W/O DYE: CPT | Performed by: PODIATRIST

## 2025-08-10 ENCOUNTER — PATIENT MESSAGE (OUTPATIENT)
Dept: SURGERY | Facility: CLINIC | Age: 70
End: 2025-08-10

## 2025-08-11 RX ORDER — TIRZEPATIDE 7.5 MG/.5ML
7.5 INJECTION, SOLUTION SUBCUTANEOUS WEEKLY
Qty: 2 ML | Refills: 0 | Status: SHIPPED | OUTPATIENT
Start: 2025-08-11 | End: 2025-09-02

## 2025-08-13 ENCOUNTER — TELEPHONE (OUTPATIENT)
Facility: CLINIC | Age: 70
End: 2025-08-13

## 2025-08-25 ENCOUNTER — OFFICE VISIT (OUTPATIENT)
Dept: SURGERY | Facility: CLINIC | Age: 70
End: 2025-08-25

## 2025-08-25 VITALS
SYSTOLIC BLOOD PRESSURE: 126 MMHG | DIASTOLIC BLOOD PRESSURE: 72 MMHG | WEIGHT: 221 LBS | HEART RATE: 80 BPM | HEIGHT: 68.3 IN | BODY MASS INDEX: 33.49 KG/M2

## 2025-08-25 DIAGNOSIS — E66.812 OBESITY, CLASS II, BMI 35-39.9: ICD-10-CM

## 2025-08-25 DIAGNOSIS — E66.811 CLASS 1 OBESITY DUE TO EXCESS CALORIES WITHOUT SERIOUS COMORBIDITY WITH BODY MASS INDEX (BMI) OF 33.0 TO 33.9 IN ADULT: ICD-10-CM

## 2025-08-25 DIAGNOSIS — R35.1 BPH ASSOCIATED WITH NOCTURIA: ICD-10-CM

## 2025-08-25 DIAGNOSIS — I10 PRIMARY HYPERTENSION: ICD-10-CM

## 2025-08-25 DIAGNOSIS — E66.09 CLASS 1 OBESITY DUE TO EXCESS CALORIES WITHOUT SERIOUS COMORBIDITY WITH BODY MASS INDEX (BMI) OF 33.0 TO 33.9 IN ADULT: ICD-10-CM

## 2025-08-25 DIAGNOSIS — K21.9 GASTROESOPHAGEAL REFLUX DISEASE, UNSPECIFIED WHETHER ESOPHAGITIS PRESENT: ICD-10-CM

## 2025-08-25 DIAGNOSIS — N40.1 BPH ASSOCIATED WITH NOCTURIA: ICD-10-CM

## 2025-08-25 DIAGNOSIS — E78.00 HYPERCHOLESTEROLEMIA: ICD-10-CM

## 2025-08-25 DIAGNOSIS — G47.33 OSA (OBSTRUCTIVE SLEEP APNEA): Primary | ICD-10-CM

## 2025-08-25 PROCEDURE — 1160F RVW MEDS BY RX/DR IN RCRD: CPT | Performed by: INTERNAL MEDICINE

## 2025-08-25 PROCEDURE — 3008F BODY MASS INDEX DOCD: CPT | Performed by: INTERNAL MEDICINE

## 2025-08-25 PROCEDURE — 3078F DIAST BP <80 MM HG: CPT | Performed by: INTERNAL MEDICINE

## 2025-08-25 PROCEDURE — 99214 OFFICE O/P EST MOD 30 MIN: CPT | Performed by: INTERNAL MEDICINE

## 2025-08-25 PROCEDURE — 1159F MED LIST DOCD IN RCRD: CPT | Performed by: INTERNAL MEDICINE

## 2025-08-25 PROCEDURE — 3074F SYST BP LT 130 MM HG: CPT | Performed by: INTERNAL MEDICINE

## 2025-08-25 RX ORDER — TIRZEPATIDE 10 MG/.5ML
10 INJECTION, SOLUTION SUBCUTANEOUS WEEKLY
Qty: 6 ML | Refills: 0 | Status: SHIPPED | OUTPATIENT
Start: 2025-08-25 | End: 2025-09-16

## 2025-08-26 ENCOUNTER — MED MANAGEMENT (OUTPATIENT)
Age: 70
End: 2025-08-26

## (undated) DIAGNOSIS — Z12.11 COLON CANCER SCREENING: Primary | ICD-10-CM

## (undated) DEVICE — UROLOGY DRAIN BAG

## (undated) DEVICE — BINDER ABD L/XL H12XL62IN 4 PNL UNIV PREM

## (undated) DEVICE — SUTURE VICRYL 4-0 RB-1

## (undated) DEVICE — LAPAROVUE VISIBILITY SYSTEM LAPAROSCOPIC SOLUTIONS: Brand: LAPAROVUE

## (undated) DEVICE — DRAPE LAP 100IN X 76IN X 120IN SMS FAB CHOLE

## (undated) DEVICE — SOLUTION IRRIG 1000ML 0.9% NACL USP BTL

## (undated) DEVICE — FENESTRATED BIPOLAR FORCEPS: Brand: ENDOWRIST

## (undated) DEVICE — ABSORBABLE WOUND CLOSURE DEVICE: Brand: V-LOC 90

## (undated) DEVICE — CANNULA SEAL

## (undated) DEVICE — NEEDLE SPINAL 25X3-1/2 BLUE

## (undated) DEVICE — GAMMEX® PI HYBRID SIZE 7.5, STERILE POWDER-FREE SURGICAL GLOVE, POLYISOPRENE AND NEOPRENE BLEND: Brand: GAMMEX

## (undated) DEVICE — SOL H2O 1000ML BTL

## (undated) DEVICE — CAUTERY: TIP CLEANER XR 100/CS: Brand: MEDICAL ACTION INDUSTRIES

## (undated) DEVICE — C-ARM: Brand: UNBRANDED

## (undated) DEVICE — SOL  .9 1000ML BTL

## (undated) DEVICE — SUT ETHLN 5-0 18IN FS-2 NABSRB BLK 19MM 3/8 C

## (undated) DEVICE — ELECTRODE ES 2.75IN PTFE BLDE MOD E-Z CLN

## (undated) DEVICE — TIP COVER ACCESSORY

## (undated) DEVICE — SKIN PREP TRAY 4 COMPARTM TRAY: Brand: MEDLINE INDUSTRIES, INC.

## (undated) DEVICE — TRAY SKIN PREP PVP-1

## (undated) DEVICE — MEDI-VAC NON-CONDUCTIVE SUCTION TUBING: Brand: CARDINAL HEALTH

## (undated) DEVICE — MARKER SURG SKIN GENTIAN VLT INK REG TIP

## (undated) DEVICE — GAUZE SPONGES: Brand: DEROYAL

## (undated) DEVICE — CABLE BPLR L12FT FLYING LD DISP

## (undated) DEVICE — UNDYED BRAIDED (POLYGLACTIN 910), SYNTHETIC ABSORBABLE SUTURE: Brand: COATED VICRYL

## (undated) DEVICE — 3M™ TEGADERM™ HP TRANSPARENT FILM DRESSING FRAME STYLE, 9534HP, 2-3/8 X 2-3/4 IN (6 CM X 7 CM), 100/CT 4CT/CASE: Brand: 3M™ TEGADERM™

## (undated) DEVICE — DRAPE SRG 131X112X63IN GYN URO

## (undated) DEVICE — GAMMEX® PI HYBRID SIZE 6, STERILE POWDER-FREE SURGICAL GLOVE, POLYISOPRENE AND NEOPRENE BLEND: Brand: GAMMEX

## (undated) DEVICE — COLUMN DRAPE

## (undated) DEVICE — BAG DRAIN INFECTION CNTRL 2000

## (undated) DEVICE — ENCORE® LATEX ACCLAIM SIZE 8, STERILE LATEX POWDER-FREE SURGICAL GLOVE: Brand: ENCORE

## (undated) DEVICE — ROBOTIC: Brand: MEDLINE INDUSTRIES, INC.

## (undated) DEVICE — ARM DRAPE

## (undated) DEVICE — Device

## (undated) DEVICE — MEGA SUTURECUT ND: Brand: ENDOWRIST

## (undated) DEVICE — BLADELESS OBTURATOR: Brand: WECK VISTA

## (undated) DEVICE — MONOPOLAR CURVED SCISSORS: Brand: ENDOWRIST

## (undated) DEVICE — PLUMEPORT ACTIV LAPAROSCOPIC SMOKE FILTRATION DEVICE: Brand: PLUMEPORT ACTIVE

## (undated) DEVICE — PROGRASP FORCEPS: Brand: ENDOWRIST

## (undated) DEVICE — CONMED ACCESSORY ELECTRODE, NEEDLE ELECTRODE

## (undated) DEVICE — INSUFFLATION NEEDLE TO ESTABLISH PNEUMOPERITONEUM.: Brand: INSUFFLATION NEEDLE

## (undated) DEVICE — SOL  .9 3000ML

## (undated) DEVICE — SOLO FLEX HYBRID GUIDEWIRE .03

## (undated) DEVICE — PACK CDS HEAD

## (undated) DEVICE — 3M™ STERI-STRIP™ COMPOUND BENZOIN TINCTURE 40 BAGS/CARTON 4 CARTONS/CASE C1544: Brand: 3M™ STERI-STRIP™

## (undated) DEVICE — DRAPE,UNDERBUTTOCKS,STERILE: Brand: MEDLINE

## (undated) DEVICE — CATH FOLEY COUNCIL 16FR 5CC

## (undated) DEVICE — PAD N ADH 3X4IN COT POLY SFT PERF FLM

## (undated) DEVICE — CYSTO PACK: Brand: MEDLINE INDUSTRIES, INC.

## (undated) NOTE — MR AVS SNAPSHOT
[facility] [place] [address] [place]  [id number] [phone]               Thank you for choosing us for your health care visit with Janelle Alicea MD.  We are glad to serve you and happy to provide you with this summary of Eat plenty of low-fat dairy products High fat meats and dairy   Choose whole grain products Foods high in sodium   Water is best for hydration Fast food.    Eat at home when possible     Tips for increasing your physical activity – Adults who are physically

## (undated) NOTE — MR AVS SNAPSHOT
After Visit Summary   4/18/2024    Anish Simental   MRN: [id number]           Visit Information     Date & Time  4/18/2024 11:30 AM Provider  Luna Virk MD [facility]. Phone  [phone]      Allergies as of 4/18/2024  Review status set to Review Complete on 4/18/2024       Noted Reaction Type Reactions    Penicillins 12/22/2014    FACE FLUSHING    No skin peeling or blisters.       Your Current Medications        Dosage    lisinopril-hydroCHLOROthiazide 10-12.5 MG Oral Tab Take 1 tablet by mouth daily.    Lansoprazole 15 MG Oral Capsule Delayed Release Take 1 capsule (15 mg total) by mouth daily.    tadalafil 5 MG Oral Tab Take 1 tablet (5 mg total) by mouth daily as needed for Erectile Dysfunction.      Diagnoses for This Visit    Facial mass   [[id number]]  -  Primary           We Ordered the Following     Normal Orders This Visit    Cytology,Fine Needle [[id number] CUSTOM]     Future Labs/Procedures Expected by Expires    Cytology,Fine Needle [[id number] CUSTOM]  4/18/2024 4/18/2025      Instructions    Had a 2 x 2 cm left preauricular subcutaneous mass.  Although this may be an dermoid cyst, as you have had a basal cell cell cancer in the area, this could also represent a basal cell cancer.  A fine-needle aspirate was done.  I will of course notify you of all results.                       Did you know that [facility] primary care physicians now offer Video Visits through [employer] for adult patients for a variety of conditions such as allergies, back pain and cold symptoms? Skip the drive and waiting room and online chat with a doctor face-to-face using your web-cam enabled computer or mobile device wherever you are. Video Visits cost $50 and can be paid hassle-free using a credit, debit, or health savings card.  Not active on [employer]? Ask us how to get signed up today!          If you receive a survey from [employer], please take a few minutes  to complete it and provide feedback. We strive to deliver the best patient experience and are looking for ways to make improvements. Your feedback will help us do so. For more information on [employer], please visit [email or web address]           No text in SmartText           No text in SmartText

## (undated) NOTE — LETTER
AUTHORIZATION FOR SURGICAL OPERATION OR OTHER PROCEDURE    1. I hereby authorize Dr. Virk, and [facility] staff assigned to my case to perform the following operation and/or procedure at the [facility] site:    ____FNA of LT facial mass ___________________________________________________________________________________________      _______________________________________________________________________________________________    2.  My physician has explained the nature and purpose of the operation or other procedure, possible alternative methods of treatment, the risks involved, and the possibility of complication to me.  I acknowledge that no guarantee has been made as to the result that may be obtained.  3.  I recognize that, during the course of this operation, or other procedure, unforseen conditions may necessitate additional or different procedure than those listed above.  I, therefore, further authorize and request that the above named physician, his/her physician assistants or designees perform such procedures as are, in his/her professional opinion, necessary and desirable.  4.  Any tissue or organs removed in the operation or other procedure may be disposed of by and at the discretion of the [facility] and [facility].  5.  I understand that in the event of a medical emergency, I will be transported by local paramedics to [facility] or other hospital emergency department.  6.  I certify that I have read and fully understand the above consent to operation and/or other procedure.    7.  I acknowledge that my physician has explained sedation/analgesia administration to me including the risks and benefits.  I consent to the administration of sedation/analgesia as may be necessary or desirable in the judgement of my physician.    Witness signature: ___________________________________________________ Date:  ______/______/_____                     Time:  ________ A.M.  P.M.       Patient Name:  ______________________________________________________  (please print)      Patient signature:  ___________________________________________________             Relationship to Patient:           []  Parent    Responsible person                          []  Spouse  In case of minor or                    [] Other  _____________   Incompetent name:  __________________________________________________                               (please print)      _____________      Responsible person  In case of minor or  Incompetent signature:  _______________________________________________    Statement of Physician  My signature below affirms that prior to the time of the procedure, I have explained to the patient and/or his/her guardian, the risks and benefits involved in the proposed treatment and any reasonable alternative to the proposed treatment.  I have also explained the risks and benefits involved in the refusal of the proposed treatment and have answered the patient's questions.                        Date:  ______/______/_______  Provider                      Signature:  __________________________________________________________       Time:  ___________ A.M    P.M.